# Patient Record
Sex: MALE | Race: WHITE | NOT HISPANIC OR LATINO | Employment: OTHER | URBAN - METROPOLITAN AREA
[De-identification: names, ages, dates, MRNs, and addresses within clinical notes are randomized per-mention and may not be internally consistent; named-entity substitution may affect disease eponyms.]

---

## 2020-03-07 ENCOUNTER — OFFICE VISIT (OUTPATIENT)
Dept: URGENT CARE | Facility: CLINIC | Age: 80
End: 2020-03-07
Payer: MEDICARE

## 2020-03-07 VITALS
SYSTOLIC BLOOD PRESSURE: 142 MMHG | RESPIRATION RATE: 18 BRPM | DIASTOLIC BLOOD PRESSURE: 76 MMHG | HEIGHT: 71 IN | TEMPERATURE: 96.3 F | BODY MASS INDEX: 24.5 KG/M2 | HEART RATE: 58 BPM | OXYGEN SATURATION: 98 % | WEIGHT: 175 LBS

## 2020-03-07 DIAGNOSIS — J30.89 NON-SEASONAL ALLERGIC RHINITIS, UNSPECIFIED TRIGGER: Primary | ICD-10-CM

## 2020-03-07 PROCEDURE — G0463 HOSPITAL OUTPT CLINIC VISIT: HCPCS | Performed by: FAMILY MEDICINE

## 2020-03-07 PROCEDURE — 99203 OFFICE O/P NEW LOW 30 MIN: CPT | Performed by: FAMILY MEDICINE

## 2020-03-07 RX ORDER — SIMVASTATIN 40 MG
40 TABLET ORAL
COMMUNITY
Start: 2020-01-15

## 2020-03-07 NOTE — PROGRESS NOTES
Assessment/Plan:  Asked him to hold the chronic or nasal spray for the next week or to see if his symptoms improve  He can use a saline nasal spray if needed  Follow-up with family doctor as needed  Diagnoses and all orders for this visit:    Non-seasonal allergic rhinitis, unspecified trigger    Other orders  -     simvastatin (ZOCOR) 40 mg tablet  -     budesonide (RINOCORT AQUA) 32 MCG/ACT nasal spray; 1 spray into each nostril daily            Subjective:        Patient ID: Jamal Partida is a 78 y o  male  Patient presents with:  Sinus Problem: "my sinuses burn", onset 2 weeks ago  Bilateral earaches starting this morning    No prior treatment  The following portions of the patient's history were reviewed and updated as appropriate: allergies, current medications, past family history, past medical history, past social history, past surgical history and problem list       Review of Systems   Constitutional: Negative  HENT: Positive for congestion, sinus pressure and sinus pain  Eyes: Negative  Respiratory: Negative  Cardiovascular: Negative  Gastrointestinal: Negative  Endocrine: Negative  Genitourinary: Negative  Musculoskeletal: Negative  Skin: Negative  Allergic/Immunologic: Negative  Neurological: Negative  Hematological: Negative  Psychiatric/Behavioral: Negative  All other systems reviewed and are negative  Objective:             /76   Pulse 58   Temp (!) 96 3 °F (35 7 °C) (Tympanic)   Resp 18   Ht 5' 11" (1 803 m)   Wt 79 4 kg (175 lb)   SpO2 98%   BMI 24 41 kg/m²          Physical Exam   Constitutional: He is oriented to person, place, and time  He appears well-developed and well-nourished  HENT:   Head: Normocephalic and atraumatic     Right Ear: External ear normal    Left Ear: External ear normal    Nose: Nose normal    Mouth/Throat: Oropharynx is clear and moist    Eyes: Pupils are equal, round, and reactive to light  Conjunctivae and EOM are normal    Neck: Normal range of motion  Neck supple  Cardiovascular: Normal rate, regular rhythm and normal heart sounds  Pulmonary/Chest: Effort normal and breath sounds normal    Abdominal: Soft  Bowel sounds are normal    Musculoskeletal: Normal range of motion  Neurological: He is alert and oriented to person, place, and time  He has normal reflexes  Skin: Skin is warm and dry  Psychiatric: He has a normal mood and affect  His behavior is normal    Nursing note and vitals reviewed

## 2020-06-04 DIAGNOSIS — I34.0 NONRHEUMATIC MITRAL (VALVE) INSUFFICIENCY: ICD-10-CM

## 2020-06-05 ENCOUNTER — TELEPHONE (OUTPATIENT)
Dept: NON INVASIVE DIAGNOSTICS | Facility: HOSPITAL | Age: 80
End: 2020-06-05

## 2020-06-12 ENCOUNTER — APPOINTMENT (OUTPATIENT)
Dept: PREADMISSION TESTING | Facility: HOSPITAL | Age: 80
End: 2020-06-12
Payer: MEDICARE

## 2020-06-12 DIAGNOSIS — Z11.59 SCREENING FOR VIRAL DISEASE: Primary | ICD-10-CM

## 2020-06-12 PROCEDURE — U0003 INFECTIOUS AGENT DETECTION BY NUCLEIC ACID (DNA OR RNA); SEVERE ACUTE RESPIRATORY SYNDROME CORONAVIRUS 2 (SARS-COV-2) (CORONAVIRUS DISEASE [COVID-19]), AMPLIFIED PROBE TECHNIQUE, MAKING USE OF HIGH THROUGHPUT TECHNOLOGIES AS DESCRIBED BY CMS-2020-01-R: HCPCS

## 2020-06-15 LAB — SARS-COV-2 RNA SPEC QL NAA+PROBE: NOT DETECTED

## 2020-06-16 ENCOUNTER — TELEPHONE (OUTPATIENT)
Dept: NON INVASIVE DIAGNOSTICS | Facility: HOSPITAL | Age: 80
End: 2020-06-16

## 2020-06-16 ENCOUNTER — ANESTHESIA EVENT (OUTPATIENT)
Dept: NON INVASIVE DIAGNOSTICS | Facility: HOSPITAL | Age: 80
End: 2020-06-16

## 2020-06-17 ENCOUNTER — ANESTHESIA (OUTPATIENT)
Dept: NON INVASIVE DIAGNOSTICS | Facility: HOSPITAL | Age: 80
End: 2020-06-17

## 2020-06-17 ENCOUNTER — HOSPITAL ENCOUNTER (OUTPATIENT)
Dept: NON INVASIVE DIAGNOSTICS | Facility: HOSPITAL | Age: 80
Discharge: HOME | End: 2020-06-17
Attending: INTERNAL MEDICINE
Payer: MEDICARE

## 2020-06-17 VITALS
WEIGHT: 175 LBS | SYSTOLIC BLOOD PRESSURE: 136 MMHG | HEIGHT: 71 IN | BODY MASS INDEX: 24.5 KG/M2 | OXYGEN SATURATION: 98 % | RESPIRATION RATE: 18 BRPM | HEART RATE: 59 BPM | DIASTOLIC BLOOD PRESSURE: 74 MMHG | TEMPERATURE: 97.4 F

## 2020-06-17 DIAGNOSIS — I34.0 NONRHEUMATIC MITRAL (VALVE) INSUFFICIENCY: ICD-10-CM

## 2020-06-17 LAB
ANION GAP SERPL CALCULATED.3IONS-SCNC: 5 MMOL/L (ref 4–13)
BASOPHILS # BLD AUTO: 0.05 THOUSANDS/ΜL (ref 0–0.1)
BASOPHILS NFR BLD AUTO: 1 % (ref 0–1)
BUN SERPL-MCNC: 22 MG/DL (ref 5–25)
CALCIUM SERPL-MCNC: 8.5 MG/DL (ref 8.3–10.1)
CHLORIDE SERPL-SCNC: 106 MMOL/L (ref 100–108)
CO2 SERPL-SCNC: 29 MMOL/L (ref 21–32)
CREAT SERPL-MCNC: 0.99 MG/DL (ref 0.6–1.3)
EOSINOPHIL # BLD AUTO: 0.15 THOUSAND/ΜL (ref 0–0.61)
EOSINOPHIL NFR BLD AUTO: 4 % (ref 0–6)
ERYTHROCYTE [DISTWIDTH] IN BLOOD BY AUTOMATED COUNT: 12.6 % (ref 11.6–15.1)
GFR SERPL CREATININE-BSD FRML MDRD: 71 ML/MIN/1.73SQ M
GLUCOSE P FAST SERPL-MCNC: 103 MG/DL (ref 65–99)
GLUCOSE SERPL-MCNC: 103 MG/DL (ref 65–140)
HCT VFR BLD AUTO: 39.7 % (ref 36.5–49.3)
HGB BLD-MCNC: 13.3 G/DL (ref 12–17)
IMM GRANULOCYTES # BLD AUTO: 0.02 THOUSAND/UL (ref 0–0.2)
IMM GRANULOCYTES NFR BLD AUTO: 1 % (ref 0–2)
LYMPHOCYTES # BLD AUTO: 1.33 THOUSANDS/ΜL (ref 0.6–4.47)
LYMPHOCYTES NFR BLD AUTO: 31 % (ref 14–44)
MCH RBC QN AUTO: 30.6 PG (ref 26.8–34.3)
MCHC RBC AUTO-ENTMCNC: 33.5 G/DL (ref 31.4–37.4)
MCV RBC AUTO: 91 FL (ref 82–98)
MONOCYTES # BLD AUTO: 0.5 THOUSAND/ΜL (ref 0.17–1.22)
MONOCYTES NFR BLD AUTO: 12 % (ref 4–12)
NEUTROPHILS # BLD AUTO: 2.29 THOUSANDS/ΜL (ref 1.85–7.62)
NEUTS SEG NFR BLD AUTO: 51 % (ref 43–75)
NRBC BLD AUTO-RTO: 0 /100 WBCS
PLATELET # BLD AUTO: 156 THOUSANDS/UL (ref 149–390)
PMV BLD AUTO: 9.7 FL (ref 8.9–12.7)
POTASSIUM SERPL-SCNC: 4.8 MMOL/L (ref 3.5–5.3)
RBC # BLD AUTO: 4.35 MILLION/UL (ref 3.88–5.62)
SODIUM SERPL-SCNC: 140 MMOL/L (ref 136–145)
WBC # BLD AUTO: 4.34 THOUSAND/UL (ref 4.31–10.16)

## 2020-06-17 PROCEDURE — 80048 BASIC METABOLIC PNL TOTAL CA: CPT | Performed by: PHYSICIAN ASSISTANT

## 2020-06-17 PROCEDURE — 85025 COMPLETE CBC W/AUTO DIFF WBC: CPT | Performed by: PHYSICIAN ASSISTANT

## 2020-06-17 PROCEDURE — 93312 ECHO TRANSESOPHAGEAL: CPT

## 2020-06-17 RX ORDER — ALBUTEROL SULFATE 2.5 MG/3ML
2.5 SOLUTION RESPIRATORY (INHALATION) ONCE AS NEEDED
Status: CANCELLED | OUTPATIENT
Start: 2020-06-17

## 2020-06-17 RX ORDER — PROMETHAZINE HYDROCHLORIDE 25 MG/ML
12.5 INJECTION, SOLUTION INTRAMUSCULAR; INTRAVENOUS ONCE AS NEEDED
Status: CANCELLED | OUTPATIENT
Start: 2020-06-17

## 2020-06-17 RX ORDER — MEPERIDINE HYDROCHLORIDE 25 MG/ML
12.5 INJECTION INTRAMUSCULAR; INTRAVENOUS; SUBCUTANEOUS ONCE
Status: CANCELLED | OUTPATIENT
Start: 2020-06-17 | End: 2020-06-17

## 2020-06-17 RX ORDER — SODIUM CHLORIDE, SODIUM LACTATE, POTASSIUM CHLORIDE, CALCIUM CHLORIDE 600; 310; 30; 20 MG/100ML; MG/100ML; MG/100ML; MG/100ML
INJECTION, SOLUTION INTRAVENOUS CONTINUOUS PRN
Status: DISCONTINUED | OUTPATIENT
Start: 2020-06-17 | End: 2020-06-17 | Stop reason: SURG

## 2020-06-17 RX ORDER — PROPOFOL 10 MG/ML
INJECTION, EMULSION INTRAVENOUS AS NEEDED
Status: DISCONTINUED | OUTPATIENT
Start: 2020-06-17 | End: 2020-06-17 | Stop reason: SURG

## 2020-06-17 RX ORDER — ONDANSETRON 2 MG/ML
4 INJECTION INTRAMUSCULAR; INTRAVENOUS ONCE AS NEEDED
Status: CANCELLED | OUTPATIENT
Start: 2020-06-17

## 2020-06-17 RX ORDER — LIDOCAINE HYDROCHLORIDE 10 MG/ML
INJECTION, SOLUTION EPIDURAL; INFILTRATION; INTRACAUDAL; PERINEURAL AS NEEDED
Status: DISCONTINUED | OUTPATIENT
Start: 2020-06-17 | End: 2020-06-17 | Stop reason: SURG

## 2020-06-17 RX ORDER — SODIUM CHLORIDE, SODIUM LACTATE, POTASSIUM CHLORIDE, CALCIUM CHLORIDE 600; 310; 30; 20 MG/100ML; MG/100ML; MG/100ML; MG/100ML
125 INJECTION, SOLUTION INTRAVENOUS CONTINUOUS
Status: CANCELLED | OUTPATIENT
Start: 2020-06-17

## 2020-06-17 RX ORDER — FENTANYL CITRATE/PF 50 MCG/ML
25 SYRINGE (ML) INJECTION
Status: CANCELLED | OUTPATIENT
Start: 2020-06-17

## 2020-06-17 RX ORDER — LABETALOL 20 MG/4 ML (5 MG/ML) INTRAVENOUS SYRINGE
5
Status: CANCELLED | OUTPATIENT
Start: 2020-06-17

## 2020-06-17 RX ADMIN — PROPOFOL 50 MG: 10 INJECTION, EMULSION INTRAVENOUS at 08:43

## 2020-06-17 RX ADMIN — PROPOFOL 100 MG: 10 INJECTION, EMULSION INTRAVENOUS at 08:41

## 2020-06-17 RX ADMIN — LIDOCAINE HYDROCHLORIDE 50 MG: 10 INJECTION, SOLUTION EPIDURAL; INFILTRATION; INTRACAUDAL; PERINEURAL at 08:40

## 2020-06-17 RX ADMIN — PROPOFOL 50 MG: 10 INJECTION, EMULSION INTRAVENOUS at 08:47

## 2020-06-17 RX ADMIN — SODIUM CHLORIDE, SODIUM LACTATE, POTASSIUM CHLORIDE, AND CALCIUM CHLORIDE: .6; .31; .03; .02 INJECTION, SOLUTION INTRAVENOUS at 08:35

## 2020-06-17 RX ADMIN — PROPOFOL 20 MG: 10 INJECTION, EMULSION INTRAVENOUS at 08:51

## 2020-06-17 RX ADMIN — PROPOFOL 50 MG: 10 INJECTION, EMULSION INTRAVENOUS at 08:44

## 2020-07-02 DIAGNOSIS — R93.1 ABNORMAL FINDINGS ON DIAGNOSTIC IMAGING OF HEART AND CORONARY CIRCULATION: ICD-10-CM

## 2020-07-02 DIAGNOSIS — I42.0 DILATED CARDIOMYOPATHY (HCC): ICD-10-CM

## 2020-07-02 DIAGNOSIS — E78.5 HYPERLIPIDEMIA, UNSPECIFIED: ICD-10-CM

## 2020-07-02 DIAGNOSIS — I34.0 NONRHEUMATIC MITRAL (VALVE) INSUFFICIENCY: ICD-10-CM

## 2020-07-02 DIAGNOSIS — I49.5 SICK SINUS SYNDROME (HCC): ICD-10-CM

## 2020-07-02 DIAGNOSIS — Z95.0 PRESENCE OF CARDIAC PACEMAKER: ICD-10-CM

## 2020-11-08 ENCOUNTER — HOSPITAL ENCOUNTER (EMERGENCY)
Facility: HOSPITAL | Age: 80
Discharge: ANOTHER HOSPITAL | End: 2020-11-08
Attending: EMERGENCY MEDICINE | Admitting: EMERGENCY MEDICINE
Payer: MEDICARE

## 2020-11-08 ENCOUNTER — APPOINTMENT (EMERGENCY)
Dept: CT IMAGING | Facility: HOSPITAL | Age: 80
End: 2020-11-08
Payer: MEDICARE

## 2020-11-08 ENCOUNTER — APPOINTMENT (EMERGENCY)
Dept: RADIOLOGY | Facility: HOSPITAL | Age: 80
End: 2020-11-08
Payer: MEDICARE

## 2020-11-08 VITALS
SYSTOLIC BLOOD PRESSURE: 176 MMHG | OXYGEN SATURATION: 98 % | HEART RATE: 66 BPM | TEMPERATURE: 97.8 F | RESPIRATION RATE: 18 BRPM | DIASTOLIC BLOOD PRESSURE: 77 MMHG

## 2020-11-08 DIAGNOSIS — S22.42XA CLOSED FRACTURE OF MULTIPLE RIBS OF LEFT SIDE, INITIAL ENCOUNTER: Primary | ICD-10-CM

## 2020-11-08 LAB
ALBUMIN SERPL BCP-MCNC: 3.9 G/DL (ref 3.5–5)
ALP SERPL-CCNC: 65 U/L (ref 46–116)
ALT SERPL W P-5'-P-CCNC: 30 U/L (ref 12–78)
ANION GAP SERPL CALCULATED.3IONS-SCNC: 9 MMOL/L (ref 4–13)
AST SERPL W P-5'-P-CCNC: 26 U/L (ref 5–45)
BACTERIA UR QL AUTO: ABNORMAL /HPF
BASOPHILS # BLD AUTO: 0.1 THOUSANDS/ΜL (ref 0–0.1)
BASOPHILS NFR BLD AUTO: 1 % (ref 0–1)
BILIRUB SERPL-MCNC: 0.46 MG/DL (ref 0.2–1)
BILIRUB UR QL STRIP: NEGATIVE
BUN SERPL-MCNC: 31 MG/DL (ref 5–25)
CALCIUM SERPL-MCNC: 9.4 MG/DL (ref 8.3–10.1)
CHLORIDE SERPL-SCNC: 104 MMOL/L (ref 100–108)
CLARITY UR: CLEAR
CO2 SERPL-SCNC: 28 MMOL/L (ref 21–32)
COLOR UR: YELLOW
CREAT SERPL-MCNC: 1.08 MG/DL (ref 0.6–1.3)
EOSINOPHIL # BLD AUTO: 0.19 THOUSAND/ΜL (ref 0–0.61)
EOSINOPHIL NFR BLD AUTO: 2 % (ref 0–6)
ERYTHROCYTE [DISTWIDTH] IN BLOOD BY AUTOMATED COUNT: 12.7 % (ref 11.6–15.1)
GFR SERPL CREATININE-BSD FRML MDRD: 64 ML/MIN/1.73SQ M
GLUCOSE SERPL-MCNC: 116 MG/DL (ref 65–140)
GLUCOSE UR STRIP-MCNC: NEGATIVE MG/DL
HCT VFR BLD AUTO: 41.4 % (ref 36.5–49.3)
HGB BLD-MCNC: 13.6 G/DL (ref 12–17)
HGB UR QL STRIP.AUTO: ABNORMAL
IMM GRANULOCYTES # BLD AUTO: 0.07 THOUSAND/UL (ref 0–0.2)
IMM GRANULOCYTES NFR BLD AUTO: 1 % (ref 0–2)
KETONES UR STRIP-MCNC: ABNORMAL MG/DL
LEUKOCYTE ESTERASE UR QL STRIP: NEGATIVE
LYMPHOCYTES # BLD AUTO: 1.98 THOUSANDS/ΜL (ref 0.6–4.47)
LYMPHOCYTES NFR BLD AUTO: 23 % (ref 14–44)
MCH RBC QN AUTO: 30.2 PG (ref 26.8–34.3)
MCHC RBC AUTO-ENTMCNC: 32.9 G/DL (ref 31.4–37.4)
MCV RBC AUTO: 92 FL (ref 82–98)
MONOCYTES # BLD AUTO: 0.69 THOUSAND/ΜL (ref 0.17–1.22)
MONOCYTES NFR BLD AUTO: 8 % (ref 4–12)
NEUTROPHILS # BLD AUTO: 5.51 THOUSANDS/ΜL (ref 1.85–7.62)
NEUTS SEG NFR BLD AUTO: 65 % (ref 43–75)
NITRITE UR QL STRIP: NEGATIVE
NON-SQ EPI CELLS URNS QL MICRO: ABNORMAL /HPF
NRBC BLD AUTO-RTO: 0 /100 WBCS
PH UR STRIP.AUTO: 5.5 [PH]
PLATELET # BLD AUTO: 233 THOUSANDS/UL (ref 149–390)
PMV BLD AUTO: 10.1 FL (ref 8.9–12.7)
POTASSIUM SERPL-SCNC: 4.6 MMOL/L (ref 3.5–5.3)
PROT SERPL-MCNC: 7.7 G/DL (ref 6.4–8.2)
PROT UR STRIP-MCNC: NEGATIVE MG/DL
RBC # BLD AUTO: 4.51 MILLION/UL (ref 3.88–5.62)
RBC #/AREA URNS AUTO: ABNORMAL /HPF
SODIUM SERPL-SCNC: 141 MMOL/L (ref 136–145)
SP GR UR STRIP.AUTO: 1.01 (ref 1–1.03)
UROBILINOGEN UR QL STRIP.AUTO: 0.2 E.U./DL
WBC # BLD AUTO: 8.54 THOUSAND/UL (ref 4.31–10.16)
WBC #/AREA URNS AUTO: ABNORMAL /HPF

## 2020-11-08 PROCEDURE — 80053 COMPREHEN METABOLIC PANEL: CPT | Performed by: EMERGENCY MEDICINE

## 2020-11-08 PROCEDURE — 96374 THER/PROPH/DIAG INJ IV PUSH: CPT

## 2020-11-08 PROCEDURE — G1004 CDSM NDSC: HCPCS

## 2020-11-08 PROCEDURE — 85025 COMPLETE CBC W/AUTO DIFF WBC: CPT | Performed by: EMERGENCY MEDICINE

## 2020-11-08 PROCEDURE — 74177 CT ABD & PELVIS W/CONTRAST: CPT

## 2020-11-08 PROCEDURE — 99285 EMERGENCY DEPT VISIT HI MDM: CPT

## 2020-11-08 PROCEDURE — 81001 URINALYSIS AUTO W/SCOPE: CPT | Performed by: EMERGENCY MEDICINE

## 2020-11-08 PROCEDURE — 71260 CT THORAX DX C+: CPT

## 2020-11-08 PROCEDURE — 71045 X-RAY EXAM CHEST 1 VIEW: CPT

## 2020-11-08 PROCEDURE — 96376 TX/PRO/DX INJ SAME DRUG ADON: CPT

## 2020-11-08 PROCEDURE — 36415 COLL VENOUS BLD VENIPUNCTURE: CPT | Performed by: EMERGENCY MEDICINE

## 2020-11-08 PROCEDURE — 99291 CRITICAL CARE FIRST HOUR: CPT | Performed by: EMERGENCY MEDICINE

## 2020-11-08 PROCEDURE — 96361 HYDRATE IV INFUSION ADD-ON: CPT

## 2020-11-08 RX ORDER — FENTANYL CITRATE 50 UG/ML
25 INJECTION, SOLUTION INTRAMUSCULAR; INTRAVENOUS ONCE
Status: COMPLETED | OUTPATIENT
Start: 2020-11-08 | End: 2020-11-08

## 2020-11-08 RX ORDER — FENTANYL CITRATE 50 UG/ML
50 INJECTION, SOLUTION INTRAMUSCULAR; INTRAVENOUS ONCE
Status: COMPLETED | OUTPATIENT
Start: 2020-11-08 | End: 2020-11-08

## 2020-11-08 RX ADMIN — FENTANYL CITRATE 25 MCG: 50 INJECTION INTRAMUSCULAR; INTRAVENOUS at 20:25

## 2020-11-08 RX ADMIN — IOHEXOL 100 ML: 350 INJECTION, SOLUTION INTRAVENOUS at 17:56

## 2020-11-08 RX ADMIN — SODIUM CHLORIDE 1000 ML: 0.9 INJECTION, SOLUTION INTRAVENOUS at 17:03

## 2020-11-08 RX ADMIN — MORPHINE SULFATE 2 MG: 2 INJECTION, SOLUTION INTRAMUSCULAR; INTRAVENOUS at 17:00

## 2020-11-08 RX ADMIN — FENTANYL CITRATE 50 MCG: 50 INJECTION INTRAMUSCULAR; INTRAVENOUS at 18:56

## 2020-11-09 ENCOUNTER — TELEPHONE (OUTPATIENT)
Dept: INTERVENTIONAL RADIOLOGY/VASCULAR | Facility: HOSPITAL | Age: 80
End: 2020-11-09

## 2020-11-10 ENCOUNTER — TELEPHONE (OUTPATIENT)
Dept: INTERVENTIONAL RADIOLOGY/VASCULAR | Facility: HOSPITAL | Age: 80
End: 2020-11-10

## 2020-11-18 ENCOUNTER — APPOINTMENT (EMERGENCY)
Dept: RADIOLOGY | Facility: HOSPITAL | Age: 80
DRG: 199 | End: 2020-11-18
Payer: MEDICARE

## 2020-11-18 ENCOUNTER — APPOINTMENT (EMERGENCY)
Dept: CT IMAGING | Facility: HOSPITAL | Age: 80
DRG: 199 | End: 2020-11-18
Payer: MEDICARE

## 2020-11-18 ENCOUNTER — HOSPITAL ENCOUNTER (INPATIENT)
Facility: HOSPITAL | Age: 80
LOS: 2 days | DRG: 199 | End: 2020-11-20
Attending: EMERGENCY MEDICINE | Admitting: INTERNAL MEDICINE
Payer: MEDICARE

## 2020-11-18 DIAGNOSIS — J96.01 ACUTE RESPIRATORY FAILURE WITH HYPOXIA (HCC): ICD-10-CM

## 2020-11-18 DIAGNOSIS — R06.00 DYSPNEA: ICD-10-CM

## 2020-11-18 DIAGNOSIS — S22.49XA MULTIPLE RIB FRACTURES: ICD-10-CM

## 2020-11-18 DIAGNOSIS — J90 PLEURAL EFFUSION: Primary | ICD-10-CM

## 2020-11-18 DIAGNOSIS — J90 PLEURAL EFFUSION ON LEFT: ICD-10-CM

## 2020-11-18 DIAGNOSIS — S22.42XD CLOSED FRACTURE OF MULTIPLE RIBS OF LEFT SIDE WITH ROUTINE HEALING: ICD-10-CM

## 2020-11-18 LAB
ALBUMIN SERPL BCP-MCNC: 2.9 G/DL (ref 3.5–5)
ALP SERPL-CCNC: 86 U/L (ref 46–116)
ALT SERPL W P-5'-P-CCNC: 35 U/L (ref 12–78)
ANION GAP SERPL CALCULATED.3IONS-SCNC: 6 MMOL/L (ref 4–13)
APTT PPP: 31 SECONDS (ref 23–37)
AST SERPL W P-5'-P-CCNC: 24 U/L (ref 5–45)
BASOPHILS # BLD AUTO: 0.07 THOUSANDS/ΜL (ref 0–0.1)
BASOPHILS NFR BLD AUTO: 1 % (ref 0–1)
BILIRUB SERPL-MCNC: 0.67 MG/DL (ref 0.2–1)
BUN SERPL-MCNC: 25 MG/DL (ref 5–25)
CALCIUM ALBUM COR SERPL-MCNC: 9.4 MG/DL (ref 8.3–10.1)
CALCIUM SERPL-MCNC: 8.5 MG/DL (ref 8.3–10.1)
CHLORIDE SERPL-SCNC: 101 MMOL/L (ref 100–108)
CO2 SERPL-SCNC: 26 MMOL/L (ref 21–32)
CREAT SERPL-MCNC: 1.08 MG/DL (ref 0.6–1.3)
EOSINOPHIL # BLD AUTO: 0.08 THOUSAND/ΜL (ref 0–0.61)
EOSINOPHIL NFR BLD AUTO: 1 % (ref 0–6)
ERYTHROCYTE [DISTWIDTH] IN BLOOD BY AUTOMATED COUNT: 13.1 % (ref 11.6–15.1)
FLUAV RNA RESP QL NAA+PROBE: NEGATIVE
FLUBV RNA RESP QL NAA+PROBE: NEGATIVE
GFR SERPL CREATININE-BSD FRML MDRD: 64 ML/MIN/1.73SQ M
GLUCOSE SERPL-MCNC: 189 MG/DL (ref 65–140)
HCT VFR BLD AUTO: 35.3 % (ref 36.5–49.3)
HGB BLD-MCNC: 11.7 G/DL (ref 12–17)
IMM GRANULOCYTES # BLD AUTO: 0.15 THOUSAND/UL (ref 0–0.2)
IMM GRANULOCYTES NFR BLD AUTO: 1 % (ref 0–2)
INR PPP: 1.05 (ref 0.84–1.19)
LACTATE SERPL-SCNC: 1.3 MMOL/L (ref 0.5–2)
LYMPHOCYTES # BLD AUTO: 2.4 THOUSANDS/ΜL (ref 0.6–4.47)
LYMPHOCYTES NFR BLD AUTO: 17 % (ref 14–44)
MCH RBC QN AUTO: 30.3 PG (ref 26.8–34.3)
MCHC RBC AUTO-ENTMCNC: 33.1 G/DL (ref 31.4–37.4)
MCV RBC AUTO: 92 FL (ref 82–98)
MONOCYTES # BLD AUTO: 1.98 THOUSAND/ΜL (ref 0.17–1.22)
MONOCYTES NFR BLD AUTO: 14 % (ref 4–12)
NEUTROPHILS # BLD AUTO: 9.59 THOUSANDS/ΜL (ref 1.85–7.62)
NEUTS SEG NFR BLD AUTO: 66 % (ref 43–75)
NRBC BLD AUTO-RTO: 0 /100 WBCS
NT-PROBNP SERPL-MCNC: 110 PG/ML
PLATELET # BLD AUTO: 319 THOUSANDS/UL (ref 149–390)
PMV BLD AUTO: 9.4 FL (ref 8.9–12.7)
POTASSIUM SERPL-SCNC: 4.2 MMOL/L (ref 3.5–5.3)
PROT SERPL-MCNC: 6.9 G/DL (ref 6.4–8.2)
PROTHROMBIN TIME: 13.6 SECONDS (ref 11.6–14.5)
RBC # BLD AUTO: 3.86 MILLION/UL (ref 3.88–5.62)
RSV RNA RESP QL NAA+PROBE: NEGATIVE
SARS-COV-2 RNA RESP QL NAA+PROBE: NEGATIVE
SODIUM SERPL-SCNC: 133 MMOL/L (ref 136–145)
TROPONIN I SERPL-MCNC: <0.02 NG/ML
WBC # BLD AUTO: 14.27 THOUSAND/UL (ref 4.31–10.16)

## 2020-11-18 PROCEDURE — 99222 1ST HOSP IP/OBS MODERATE 55: CPT | Performed by: NURSE PRACTITIONER

## 2020-11-18 PROCEDURE — 0241U HB NFCT DS VIR RESP RNA 4 TRGT: CPT | Performed by: EMERGENCY MEDICINE

## 2020-11-18 PROCEDURE — 85025 COMPLETE CBC W/AUTO DIFF WBC: CPT | Performed by: EMERGENCY MEDICINE

## 2020-11-18 PROCEDURE — 87040 BLOOD CULTURE FOR BACTERIA: CPT | Performed by: EMERGENCY MEDICINE

## 2020-11-18 PROCEDURE — 84484 ASSAY OF TROPONIN QUANT: CPT | Performed by: EMERGENCY MEDICINE

## 2020-11-18 PROCEDURE — G1004 CDSM NDSC: HCPCS

## 2020-11-18 PROCEDURE — 99285 EMERGENCY DEPT VISIT HI MDM: CPT | Performed by: EMERGENCY MEDICINE

## 2020-11-18 PROCEDURE — 85610 PROTHROMBIN TIME: CPT | Performed by: EMERGENCY MEDICINE

## 2020-11-18 PROCEDURE — 36415 COLL VENOUS BLD VENIPUNCTURE: CPT | Performed by: EMERGENCY MEDICINE

## 2020-11-18 PROCEDURE — 83605 ASSAY OF LACTIC ACID: CPT | Performed by: EMERGENCY MEDICINE

## 2020-11-18 PROCEDURE — 83036 HEMOGLOBIN GLYCOSYLATED A1C: CPT | Performed by: NURSE PRACTITIONER

## 2020-11-18 PROCEDURE — 85730 THROMBOPLASTIN TIME PARTIAL: CPT | Performed by: EMERGENCY MEDICINE

## 2020-11-18 PROCEDURE — 93005 ELECTROCARDIOGRAM TRACING: CPT

## 2020-11-18 PROCEDURE — 99285 EMERGENCY DEPT VISIT HI MDM: CPT

## 2020-11-18 PROCEDURE — 71045 X-RAY EXAM CHEST 1 VIEW: CPT

## 2020-11-18 PROCEDURE — 83880 ASSAY OF NATRIURETIC PEPTIDE: CPT | Performed by: EMERGENCY MEDICINE

## 2020-11-18 PROCEDURE — 84145 PROCALCITONIN (PCT): CPT | Performed by: EMERGENCY MEDICINE

## 2020-11-18 PROCEDURE — 1123F ACP DISCUSS/DSCN MKR DOCD: CPT | Performed by: EMERGENCY MEDICINE

## 2020-11-18 PROCEDURE — 80053 COMPREHEN METABOLIC PANEL: CPT | Performed by: EMERGENCY MEDICINE

## 2020-11-18 PROCEDURE — 71260 CT THORAX DX C+: CPT

## 2020-11-18 RX ORDER — ONDANSETRON 2 MG/ML
4 INJECTION INTRAMUSCULAR; INTRAVENOUS EVERY 6 HOURS PRN
Status: DISCONTINUED | OUTPATIENT
Start: 2020-11-18 | End: 2020-11-20 | Stop reason: HOSPADM

## 2020-11-18 RX ORDER — CEFEPIME HYDROCHLORIDE 2 G/50ML
2000 INJECTION, SOLUTION INTRAVENOUS EVERY 12 HOURS
Status: DISCONTINUED | OUTPATIENT
Start: 2020-11-18 | End: 2020-11-20 | Stop reason: HOSPADM

## 2020-11-18 RX ORDER — PANTOPRAZOLE SODIUM 40 MG/1
40 TABLET, DELAYED RELEASE ORAL
Status: DISCONTINUED | OUTPATIENT
Start: 2020-11-19 | End: 2020-11-20 | Stop reason: HOSPADM

## 2020-11-18 RX ORDER — METRONIDAZOLE 500 MG/1
500 TABLET ORAL EVERY 8 HOURS SCHEDULED
Status: DISCONTINUED | OUTPATIENT
Start: 2020-11-18 | End: 2020-11-20 | Stop reason: HOSPADM

## 2020-11-18 RX ORDER — SENNOSIDES 8.6 MG
1 TABLET ORAL
Status: DISCONTINUED | OUTPATIENT
Start: 2020-11-18 | End: 2020-11-20 | Stop reason: HOSPADM

## 2020-11-18 RX ORDER — ACETAMINOPHEN 325 MG/1
650 TABLET ORAL EVERY 6 HOURS PRN
Status: DISCONTINUED | OUTPATIENT
Start: 2020-11-18 | End: 2020-11-20 | Stop reason: HOSPADM

## 2020-11-18 RX ORDER — DOCUSATE SODIUM 100 MG/1
100 CAPSULE, LIQUID FILLED ORAL 2 TIMES DAILY
Status: DISCONTINUED | OUTPATIENT
Start: 2020-11-19 | End: 2020-11-20 | Stop reason: HOSPADM

## 2020-11-18 RX ADMIN — IOHEXOL 85 ML: 350 INJECTION, SOLUTION INTRAVENOUS at 20:02

## 2020-11-18 RX ADMIN — VANCOMYCIN HYDROCHLORIDE 1750 MG: 1 INJECTION, POWDER, LYOPHILIZED, FOR SOLUTION INTRAVENOUS at 23:54

## 2020-11-19 ENCOUNTER — APPOINTMENT (INPATIENT)
Dept: RADIOLOGY | Facility: HOSPITAL | Age: 80
DRG: 199 | End: 2020-11-19
Payer: MEDICARE

## 2020-11-19 PROBLEM — J94.2 HEMOTHORAX ON LEFT: Status: ACTIVE | Noted: 2020-11-18

## 2020-11-19 PROBLEM — I34.0 MR (MITRAL REGURGITATION): Status: ACTIVE | Noted: 2020-11-19

## 2020-11-19 PROBLEM — D64.9 NORMOCYTIC ANEMIA: Status: ACTIVE | Noted: 2020-11-19

## 2020-11-19 PROBLEM — Z86.79 HISTORY OF SICK SINUS SYNDROME: Status: ACTIVE | Noted: 2020-11-19

## 2020-11-19 PROBLEM — D49.4 BLADDER TUMOR: Status: ACTIVE | Noted: 2020-06-24

## 2020-11-19 PROBLEM — E87.1 HYPONATREMIA: Status: ACTIVE | Noted: 2020-11-19

## 2020-11-19 LAB
ALBUMIN SERPL BCP-MCNC: 2.6 G/DL (ref 3.5–5)
ALP SERPL-CCNC: 77 U/L (ref 46–116)
ALT SERPL W P-5'-P-CCNC: 31 U/L (ref 12–78)
ANION GAP SERPL CALCULATED.3IONS-SCNC: 5 MMOL/L (ref 4–13)
APPEARANCE FLD: ABNORMAL
AST SERPL W P-5'-P-CCNC: 25 U/L (ref 5–45)
BASOPHILS # BLD AUTO: 0.05 THOUSANDS/ΜL (ref 0–0.1)
BASOPHILS NFR BLD AUTO: 1 % (ref 0–1)
BILIRUB SERPL-MCNC: 0.6 MG/DL (ref 0.2–1)
BUN SERPL-MCNC: 25 MG/DL (ref 5–25)
CALCIUM ALBUM COR SERPL-MCNC: 9.5 MG/DL (ref 8.3–10.1)
CALCIUM SERPL-MCNC: 8.4 MG/DL (ref 8.3–10.1)
CHLORIDE SERPL-SCNC: 102 MMOL/L (ref 100–108)
CO2 SERPL-SCNC: 27 MMOL/L (ref 21–32)
COLOR FLD: ABNORMAL
CREAT SERPL-MCNC: 1.02 MG/DL (ref 0.6–1.3)
EOSINOPHIL # BLD AUTO: 0.06 THOUSAND/ΜL (ref 0–0.61)
EOSINOPHIL NFR BLD AUTO: 1 % (ref 0–6)
EOSINOPHIL NFR FLD MANUAL: 2 %
ERYTHROCYTE [DISTWIDTH] IN BLOOD BY AUTOMATED COUNT: 13.1 % (ref 11.6–15.1)
EST. AVERAGE GLUCOSE BLD GHB EST-MCNC: 123 MG/DL
GFR SERPL CREATININE-BSD FRML MDRD: 68 ML/MIN/1.73SQ M
GLUCOSE SERPL-MCNC: 122 MG/DL (ref 65–140)
HBA1C MFR BLD: 5.9 %
HCT VFR BLD AUTO: 32.6 % (ref 36.5–49.3)
HGB BLD-MCNC: 10.5 G/DL (ref 12–17)
IMM GRANULOCYTES # BLD AUTO: 0.09 THOUSAND/UL (ref 0–0.2)
IMM GRANULOCYTES NFR BLD AUTO: 1 % (ref 0–2)
LDH FLD L TO P-CCNC: 2330 U/L
LDH SERPL-CCNC: 280 U/L (ref 81–234)
LYMPHOCYTES # BLD AUTO: 1.48 THOUSANDS/ΜL (ref 0.6–4.47)
LYMPHOCYTES NFR BLD AUTO: 14 % (ref 14–44)
LYMPHOCYTES NFR BLD AUTO: 5 %
MAGNESIUM SERPL-MCNC: 2.1 MG/DL (ref 1.6–2.6)
MCH RBC QN AUTO: 29.7 PG (ref 26.8–34.3)
MCHC RBC AUTO-ENTMCNC: 32.2 G/DL (ref 31.4–37.4)
MCV RBC AUTO: 92 FL (ref 82–98)
MONOCYTES # BLD AUTO: 1.72 THOUSAND/ΜL (ref 0.17–1.22)
MONOCYTES NFR BLD AUTO: 16 % (ref 4–12)
MONOCYTES NFR BLD AUTO: 4 %
NEUTROPHILS # BLD AUTO: 7.37 THOUSANDS/ΜL (ref 1.85–7.62)
NEUTS BAND NFR FLD MANUAL: 1 %
NEUTS SEG NFR BLD AUTO: 67 % (ref 43–75)
NEUTS SEG NFR BLD AUTO: 88 %
NRBC BLD AUTO-RTO: 0 /100 WBCS
PH BODY FLUID: 7.3
PHOSPHATE SERPL-MCNC: 2.7 MG/DL (ref 2.3–4.1)
PLATELET # BLD AUTO: 281 THOUSANDS/UL (ref 149–390)
PMV BLD AUTO: 9.5 FL (ref 8.9–12.7)
POTASSIUM SERPL-SCNC: 4.1 MMOL/L (ref 3.5–5.3)
PROCALCITONIN SERPL-MCNC: 0.13 NG/ML
PROT FLD-MCNC: 4.2 G/DL
PROT SERPL-MCNC: 6.4 G/DL (ref 6.4–8.2)
RBC # BLD AUTO: 3.54 MILLION/UL (ref 3.88–5.62)
SITE: ABNORMAL
SODIUM SERPL-SCNC: 134 MMOL/L (ref 136–145)
TOTAL CELLS COUNTED SPEC: 100
TROPONIN I SERPL-MCNC: 0.02 NG/ML
WBC # BLD AUTO: 10.77 THOUSAND/UL (ref 4.31–10.16)
WBC # FLD MANUAL: 4908 /UL

## 2020-11-19 PROCEDURE — 88112 CYTOPATH CELL ENHANCE TECH: CPT | Performed by: PATHOLOGY

## 2020-11-19 PROCEDURE — 87070 CULTURE OTHR SPECIMN AEROBIC: CPT | Performed by: FAMILY MEDICINE

## 2020-11-19 PROCEDURE — 84100 ASSAY OF PHOSPHORUS: CPT | Performed by: NURSE PRACTITIONER

## 2020-11-19 PROCEDURE — 71045 X-RAY EXAM CHEST 1 VIEW: CPT

## 2020-11-19 PROCEDURE — 84157 ASSAY OF PROTEIN OTHER: CPT | Performed by: FAMILY MEDICINE

## 2020-11-19 PROCEDURE — 88342 IMHCHEM/IMCYTCHM 1ST ANTB: CPT | Performed by: PATHOLOGY

## 2020-11-19 PROCEDURE — 85025 COMPLETE CBC W/AUTO DIFF WBC: CPT | Performed by: NURSE PRACTITIONER

## 2020-11-19 PROCEDURE — 83735 ASSAY OF MAGNESIUM: CPT | Performed by: NURSE PRACTITIONER

## 2020-11-19 PROCEDURE — 88341 IMHCHEM/IMCYTCHM EA ADD ANTB: CPT | Performed by: PATHOLOGY

## 2020-11-19 PROCEDURE — 83615 LACTATE (LD) (LDH) ENZYME: CPT | Performed by: FAMILY MEDICINE

## 2020-11-19 PROCEDURE — 83986 ASSAY PH BODY FLUID NOS: CPT | Performed by: FAMILY MEDICINE

## 2020-11-19 PROCEDURE — 88305 TISSUE EXAM BY PATHOLOGIST: CPT | Performed by: PATHOLOGY

## 2020-11-19 PROCEDURE — 87081 CULTURE SCREEN ONLY: CPT | Performed by: NURSE PRACTITIONER

## 2020-11-19 PROCEDURE — 89051 BODY FLUID CELL COUNT: CPT | Performed by: FAMILY MEDICINE

## 2020-11-19 PROCEDURE — 84484 ASSAY OF TROPONIN QUANT: CPT | Performed by: NURSE PRACTITIONER

## 2020-11-19 PROCEDURE — 0W9B3ZZ DRAINAGE OF LEFT PLEURAL CAVITY, PERCUTANEOUS APPROACH: ICD-10-PCS | Performed by: STUDENT IN AN ORGANIZED HEALTH CARE EDUCATION/TRAINING PROGRAM

## 2020-11-19 PROCEDURE — 87205 SMEAR GRAM STAIN: CPT | Performed by: FAMILY MEDICINE

## 2020-11-19 PROCEDURE — 99239 HOSP IP/OBS DSCHRG MGMT >30: CPT | Performed by: FAMILY MEDICINE

## 2020-11-19 PROCEDURE — 93005 ELECTROCARDIOGRAM TRACING: CPT

## 2020-11-19 PROCEDURE — 80053 COMPREHEN METABOLIC PANEL: CPT | Performed by: NURSE PRACTITIONER

## 2020-11-19 PROCEDURE — 32555 ASPIRATE PLEURA W/ IMAGING: CPT | Performed by: INTERNAL MEDICINE

## 2020-11-19 RX ORDER — VANCOMYCIN HYDROCHLORIDE 1 G/200ML
12.5 INJECTION, SOLUTION INTRAVENOUS EVERY 12 HOURS
Status: CANCELLED | OUTPATIENT
Start: 2020-11-20

## 2020-11-19 RX ORDER — POLYETHYLENE GLYCOL 3350 17 G/17G
17 POWDER, FOR SOLUTION ORAL DAILY PRN
Status: DISCONTINUED | OUTPATIENT
Start: 2020-11-19 | End: 2020-11-20 | Stop reason: HOSPADM

## 2020-11-19 RX ORDER — CEFEPIME HYDROCHLORIDE 2 G/50ML
2000 INJECTION, SOLUTION INTRAVENOUS EVERY 12 HOURS
Status: CANCELLED | OUTPATIENT
Start: 2020-11-19

## 2020-11-19 RX ORDER — ACETAMINOPHEN 325 MG/1
650 TABLET ORAL EVERY 6 HOURS PRN
Status: CANCELLED | OUTPATIENT
Start: 2020-11-19

## 2020-11-19 RX ORDER — DOCUSATE SODIUM 100 MG/1
100 CAPSULE, LIQUID FILLED ORAL 2 TIMES DAILY
Status: CANCELLED | OUTPATIENT
Start: 2020-11-19

## 2020-11-19 RX ORDER — ONDANSETRON 2 MG/ML
4 INJECTION INTRAMUSCULAR; INTRAVENOUS EVERY 6 HOURS PRN
Status: CANCELLED | OUTPATIENT
Start: 2020-11-19

## 2020-11-19 RX ORDER — LIDOCAINE 50 MG/G
1 PATCH TOPICAL DAILY
Status: DISCONTINUED | OUTPATIENT
Start: 2020-11-19 | End: 2020-11-20 | Stop reason: HOSPADM

## 2020-11-19 RX ORDER — PANTOPRAZOLE SODIUM 40 MG/1
40 TABLET, DELAYED RELEASE ORAL
Status: CANCELLED | OUTPATIENT
Start: 2020-11-20

## 2020-11-19 RX ORDER — VANCOMYCIN HYDROCHLORIDE 1 G/200ML
12.5 INJECTION, SOLUTION INTRAVENOUS EVERY 12 HOURS
Status: DISCONTINUED | OUTPATIENT
Start: 2020-11-19 | End: 2020-11-20 | Stop reason: HOSPADM

## 2020-11-19 RX ORDER — POLYETHYLENE GLYCOL 3350 17 G/17G
17 POWDER, FOR SOLUTION ORAL DAILY PRN
Status: CANCELLED | OUTPATIENT
Start: 2020-11-19

## 2020-11-19 RX ORDER — METRONIDAZOLE 500 MG/1
500 TABLET ORAL EVERY 8 HOURS SCHEDULED
Status: CANCELLED | OUTPATIENT
Start: 2020-11-19

## 2020-11-19 RX ORDER — SENNOSIDES 8.6 MG
1 TABLET ORAL
Status: CANCELLED | OUTPATIENT
Start: 2020-11-19

## 2020-11-19 RX ORDER — LIDOCAINE 50 MG/G
1 PATCH TOPICAL DAILY
Status: CANCELLED | OUTPATIENT
Start: 2020-11-20

## 2020-11-19 RX ORDER — LIDOCAINE HYDROCHLORIDE 10 MG/ML
10 INJECTION, SOLUTION EPIDURAL; INFILTRATION; INTRACAUDAL; PERINEURAL ONCE
Status: COMPLETED | OUTPATIENT
Start: 2020-11-19 | End: 2020-11-19

## 2020-11-19 RX ADMIN — LIDOCAINE 1 PATCH: 50 PATCH TOPICAL at 12:34

## 2020-11-19 RX ADMIN — DOCUSATE SODIUM 100 MG: 100 CAPSULE ORAL at 08:17

## 2020-11-19 RX ADMIN — STANDARDIZED SENNA CONCENTRATE 8.6 MG: 8.6 TABLET ORAL at 00:06

## 2020-11-19 RX ADMIN — METRONIDAZOLE 500 MG: 500 TABLET, FILM COATED ORAL at 00:05

## 2020-11-19 RX ADMIN — METRONIDAZOLE 500 MG: 500 TABLET, FILM COATED ORAL at 05:43

## 2020-11-19 RX ADMIN — ACETAMINOPHEN 650 MG: 325 TABLET ORAL at 20:42

## 2020-11-19 RX ADMIN — METRONIDAZOLE 500 MG: 500 TABLET, FILM COATED ORAL at 21:48

## 2020-11-19 RX ADMIN — DOCUSATE SODIUM 100 MG: 100 CAPSULE ORAL at 18:14

## 2020-11-19 RX ADMIN — POLYETHYLENE GLYCOL 3350 17 G: 17 POWDER, FOR SOLUTION ORAL at 18:15

## 2020-11-19 RX ADMIN — LIDOCAINE HYDROCHLORIDE 10 ML: 10 INJECTION, SOLUTION EPIDURAL; INFILTRATION; INTRACAUDAL; PERINEURAL at 11:53

## 2020-11-19 RX ADMIN — CEFEPIME HYDROCHLORIDE 2000 MG: 2 INJECTION, SOLUTION INTRAVENOUS at 02:00

## 2020-11-19 RX ADMIN — CEFEPIME HYDROCHLORIDE 2000 MG: 2 INJECTION, SOLUTION INTRAVENOUS at 23:23

## 2020-11-19 RX ADMIN — STANDARDIZED SENNA CONCENTRATE 8.6 MG: 8.6 TABLET ORAL at 21:48

## 2020-11-19 RX ADMIN — ACETAMINOPHEN 650 MG: 325 TABLET ORAL at 05:47

## 2020-11-19 RX ADMIN — METRONIDAZOLE 500 MG: 500 TABLET, FILM COATED ORAL at 14:15

## 2020-11-19 RX ADMIN — VANCOMYCIN HYDROCHLORIDE 1000 MG: 1 INJECTION, SOLUTION INTRAVENOUS at 12:38

## 2020-11-19 RX ADMIN — CEFEPIME HYDROCHLORIDE 2000 MG: 2 INJECTION, SOLUTION INTRAVENOUS at 11:31

## 2020-11-19 RX ADMIN — ACETAMINOPHEN 650 MG: 325 TABLET ORAL at 11:30

## 2020-11-19 RX ADMIN — PANTOPRAZOLE SODIUM 40 MG: 40 TABLET, DELAYED RELEASE ORAL at 05:44

## 2020-11-20 ENCOUNTER — HOSPITAL ENCOUNTER (INPATIENT)
Facility: HOSPITAL | Age: 80
LOS: 5 days | Discharge: HOME | DRG: 163 | End: 2020-11-25
Attending: GENERAL PRACTICE | Admitting: INTERNAL MEDICINE
Payer: MEDICARE

## 2020-11-20 VITALS
RESPIRATION RATE: 19 BRPM | WEIGHT: 193.56 LBS | OXYGEN SATURATION: 93 % | HEART RATE: 93 BPM | TEMPERATURE: 97.8 F | SYSTOLIC BLOOD PRESSURE: 131 MMHG | HEIGHT: 69 IN | BODY MASS INDEX: 28.67 KG/M2 | DIASTOLIC BLOOD PRESSURE: 65 MMHG

## 2020-11-20 DIAGNOSIS — J90 PLEURAL EFFUSION ON LEFT: ICD-10-CM

## 2020-11-20 DIAGNOSIS — S22.42XD CLOSED FRACTURE OF MULTIPLE RIBS OF LEFT SIDE WITH ROUTINE HEALING: ICD-10-CM

## 2020-11-20 DIAGNOSIS — J90 PLEURAL EFFUSION: ICD-10-CM

## 2020-11-20 DIAGNOSIS — I34.0 MITRAL VALVE INSUFFICIENCY, UNSPECIFIED ETIOLOGY: ICD-10-CM

## 2020-11-20 DIAGNOSIS — J30.89 NON-SEASONAL ALLERGIC RHINITIS, UNSPECIFIED TRIGGER: Primary | ICD-10-CM

## 2020-11-20 DIAGNOSIS — Z86.79 HISTORY OF SICK SINUS SYNDROME: ICD-10-CM

## 2020-11-20 DIAGNOSIS — J96.01 ACUTE RESPIRATORY FAILURE WITH HYPOXIA (HCC): ICD-10-CM

## 2020-11-20 PROBLEM — R07.9 CHEST PAIN: Status: ACTIVE | Noted: 2020-11-20

## 2020-11-20 LAB
ALBUMIN SERPL BCP-MCNC: 2.6 G/DL (ref 3.5–5)
ALP SERPL-CCNC: 88 U/L (ref 46–116)
ALT SERPL W P-5'-P-CCNC: 34 U/L (ref 12–78)
ANION GAP SERPL CALCULATED.3IONS-SCNC: 5 MMOL/L (ref 4–13)
AST SERPL W P-5'-P-CCNC: 25 U/L (ref 5–45)
ATRIAL RATE: 56 BPM
ATRIAL RATE: 60 BPM
BASOPHILS # BLD AUTO: 0.05 THOUSANDS/ΜL (ref 0–0.1)
BASOPHILS NFR BLD AUTO: 1 % (ref 0–1)
BILIRUB SERPL-MCNC: 0.52 MG/DL (ref 0.2–1)
BUN SERPL-MCNC: 22 MG/DL (ref 5–25)
CALCIUM ALBUM COR SERPL-MCNC: 9.7 MG/DL (ref 8.3–10.1)
CALCIUM SERPL-MCNC: 8.6 MG/DL (ref 8.3–10.1)
CHLORIDE SERPL-SCNC: 103 MMOL/L (ref 100–108)
CHOLEST SERPL-MCNC: 140 MG/DL (ref 50–200)
CO2 SERPL-SCNC: 28 MMOL/L (ref 21–32)
CREAT SERPL-MCNC: 1.07 MG/DL (ref 0.6–1.3)
EOSINOPHIL # BLD AUTO: 0.32 THOUSAND/ΜL (ref 0–0.61)
EOSINOPHIL NFR BLD AUTO: 3 % (ref 0–6)
ERYTHROCYTE [DISTWIDTH] IN BLOOD BY AUTOMATED COUNT: 13 % (ref 11.6–15.1)
GFR SERPL CREATININE-BSD FRML MDRD: 64 ML/MIN/1.73SQ M
GLUCOSE SERPL-MCNC: 116 MG/DL (ref 65–140)
HCT VFR BLD AUTO: 32.1 % (ref 36.5–49.3)
HDLC SERPL-MCNC: 75 MG/DL
HGB BLD-MCNC: 10.5 G/DL (ref 12–17)
IMM GRANULOCYTES # BLD AUTO: 0.07 THOUSAND/UL (ref 0–0.2)
IMM GRANULOCYTES NFR BLD AUTO: 1 % (ref 0–2)
LDLC SERPL CALC-MCNC: 50 MG/DL (ref 0–100)
LYMPHOCYTES # BLD AUTO: 1.55 THOUSANDS/ΜL (ref 0.6–4.47)
LYMPHOCYTES NFR BLD AUTO: 16 % (ref 14–44)
MAGNESIUM SERPL-MCNC: 2.2 MG/DL (ref 1.6–2.6)
MCH RBC QN AUTO: 30.1 PG (ref 26.8–34.3)
MCHC RBC AUTO-ENTMCNC: 32.7 G/DL (ref 31.4–37.4)
MCV RBC AUTO: 92 FL (ref 82–98)
MONOCYTES # BLD AUTO: 1.27 THOUSAND/ΜL (ref 0.17–1.22)
MONOCYTES NFR BLD AUTO: 13 % (ref 4–12)
MRSA NOSE QL CULT: NORMAL
NEUTROPHILS # BLD AUTO: 6.55 THOUSANDS/ΜL (ref 1.85–7.62)
NEUTS SEG NFR BLD AUTO: 66 % (ref 43–75)
NONHDLC SERPL-MCNC: 65 MG/DL
NRBC BLD AUTO-RTO: 0 /100 WBCS
P AXIS: 52 DEGREES
P AXIS: 63 DEGREES
PLATELET # BLD AUTO: 277 THOUSANDS/UL (ref 149–390)
PMV BLD AUTO: 8.8 FL (ref 8.9–12.7)
POTASSIUM SERPL-SCNC: 4.4 MMOL/L (ref 3.5–5.3)
PR INTERVAL: 288 MS
PROCALCITONIN SERPL-MCNC: 0.53 NG/ML
PROCALCITONIN SERPL-MCNC: 0.56 NG/ML
PROT SERPL-MCNC: 6.7 G/DL (ref 6.4–8.2)
QRS AXIS: 116 DEGREES
QRS AXIS: 122 DEGREES
QRSD INTERVAL: 146 MS
QRSD INTERVAL: 148 MS
QT INTERVAL: 418 MS
QT INTERVAL: 476 MS
QTC INTERVAL: 441 MS
QTC INTERVAL: 476 MS
RBC # BLD AUTO: 3.49 MILLION/UL (ref 3.88–5.62)
SODIUM SERPL-SCNC: 136 MMOL/L (ref 136–145)
T WAVE AXIS: -16 DEGREES
T WAVE AXIS: -56 DEGREES
TRIGL SERPL-MCNC: 74 MG/DL
TROPONIN I SERPL-MCNC: <0.02 NG/ML
VANCOMYCIN TROUGH SERPL-MCNC: 11.3 UG/ML (ref 10–20)
VENTRICULAR RATE: 60 BPM
VENTRICULAR RATE: 67 BPM
WBC # BLD AUTO: 9.81 THOUSAND/UL (ref 4.31–10.16)

## 2020-11-20 PROCEDURE — 99232 SBSQ HOSP IP/OBS MODERATE 35: CPT | Performed by: INTERNAL MEDICINE

## 2020-11-20 PROCEDURE — 80061 LIPID PANEL: CPT | Performed by: INTERNAL MEDICINE

## 2020-11-20 PROCEDURE — 84145 PROCALCITONIN (PCT): CPT | Performed by: EMERGENCY MEDICINE

## 2020-11-20 PROCEDURE — 93005 ELECTROCARDIOGRAM TRACING: CPT

## 2020-11-20 PROCEDURE — 99223 1ST HOSP IP/OBS HIGH 75: CPT | Performed by: THORACIC SURGERY (CARDIOTHORACIC VASCULAR SURGERY)

## 2020-11-20 PROCEDURE — 99222 1ST HOSP IP/OBS MODERATE 55: CPT | Performed by: INTERNAL MEDICINE

## 2020-11-20 PROCEDURE — 84145 PROCALCITONIN (PCT): CPT | Performed by: INTERNAL MEDICINE

## 2020-11-20 PROCEDURE — 87081 CULTURE SCREEN ONLY: CPT | Performed by: INTERNAL MEDICINE

## 2020-11-20 PROCEDURE — 85025 COMPLETE CBC W/AUTO DIFF WBC: CPT | Performed by: NURSE PRACTITIONER

## 2020-11-20 PROCEDURE — 83735 ASSAY OF MAGNESIUM: CPT | Performed by: NURSE PRACTITIONER

## 2020-11-20 PROCEDURE — 80053 COMPREHEN METABOLIC PANEL: CPT | Performed by: NURSE PRACTITIONER

## 2020-11-20 PROCEDURE — 80202 ASSAY OF VANCOMYCIN: CPT | Performed by: NURSE PRACTITIONER

## 2020-11-20 PROCEDURE — 84484 ASSAY OF TROPONIN QUANT: CPT | Performed by: INTERNAL MEDICINE

## 2020-11-20 PROCEDURE — 99223 1ST HOSP IP/OBS HIGH 75: CPT | Performed by: INTERNAL MEDICINE

## 2020-11-20 RX ORDER — ONDANSETRON 2 MG/ML
4 INJECTION INTRAMUSCULAR; INTRAVENOUS EVERY 6 HOURS PRN
Status: DISCONTINUED | OUTPATIENT
Start: 2020-11-20 | End: 2020-11-25 | Stop reason: HOSPADM

## 2020-11-20 RX ORDER — SODIUM CHLORIDE 9 MG/ML
100 INJECTION, SOLUTION INTRAVENOUS CONTINUOUS
Status: DISCONTINUED | OUTPATIENT
Start: 2020-11-20 | End: 2020-11-20

## 2020-11-20 RX ORDER — SENNOSIDES 8.6 MG
1 TABLET ORAL
Status: DISCONTINUED | OUTPATIENT
Start: 2020-11-20 | End: 2020-11-25 | Stop reason: HOSPADM

## 2020-11-20 RX ORDER — POLYETHYLENE GLYCOL 3350 17 G/17G
17 POWDER, FOR SOLUTION ORAL DAILY PRN
Status: DISCONTINUED | OUTPATIENT
Start: 2020-11-20 | End: 2020-11-25 | Stop reason: HOSPADM

## 2020-11-20 RX ORDER — ACETAMINOPHEN 325 MG/1
650 TABLET ORAL EVERY 6 HOURS PRN
Status: DISCONTINUED | OUTPATIENT
Start: 2020-11-20 | End: 2020-11-21

## 2020-11-20 RX ORDER — PANTOPRAZOLE SODIUM 40 MG/1
40 TABLET, DELAYED RELEASE ORAL
Status: DISCONTINUED | OUTPATIENT
Start: 2020-11-21 | End: 2020-11-25 | Stop reason: HOSPADM

## 2020-11-20 RX ORDER — METRONIDAZOLE 500 MG/1
500 TABLET ORAL EVERY 8 HOURS SCHEDULED
Status: DISCONTINUED | OUTPATIENT
Start: 2020-11-20 | End: 2020-11-25

## 2020-11-20 RX ORDER — SODIUM CHLORIDE 9 MG/ML
100 INJECTION, SOLUTION INTRAVENOUS CONTINUOUS
Status: DISCONTINUED | OUTPATIENT
Start: 2020-11-21 | End: 2020-11-22

## 2020-11-20 RX ORDER — DOCUSATE SODIUM 100 MG/1
100 CAPSULE, LIQUID FILLED ORAL 2 TIMES DAILY
Status: DISCONTINUED | OUTPATIENT
Start: 2020-11-20 | End: 2020-11-25 | Stop reason: HOSPADM

## 2020-11-20 RX ORDER — LIDOCAINE 50 MG/G
1 PATCH TOPICAL DAILY
Status: DISCONTINUED | OUTPATIENT
Start: 2020-11-21 | End: 2020-11-25 | Stop reason: HOSPADM

## 2020-11-20 RX ORDER — ACETAMINOPHEN 325 MG/1
650 TABLET ORAL ONCE
Status: COMPLETED | OUTPATIENT
Start: 2020-11-20 | End: 2020-11-20

## 2020-11-20 RX ADMIN — DOCUSATE SODIUM 100 MG: 100 CAPSULE ORAL at 18:34

## 2020-11-20 RX ADMIN — PANTOPRAZOLE SODIUM 40 MG: 40 TABLET, DELAYED RELEASE ORAL at 06:10

## 2020-11-20 RX ADMIN — LIDOCAINE 1 PATCH: 50 PATCH TOPICAL at 08:05

## 2020-11-20 RX ADMIN — VANCOMYCIN HYDROCHLORIDE 1500 MG: 10 INJECTION, POWDER, LYOPHILIZED, FOR SOLUTION INTRAVENOUS at 15:19

## 2020-11-20 RX ADMIN — METRONIDAZOLE 500 MG: 500 TABLET ORAL at 15:19

## 2020-11-20 RX ADMIN — POLYETHYLENE GLYCOL 3350 17 G: 17 POWDER, FOR SOLUTION ORAL at 08:50

## 2020-11-20 RX ADMIN — CEFEPIME HYDROCHLORIDE 2000 MG: 2 INJECTION, SOLUTION INTRAVENOUS at 11:36

## 2020-11-20 RX ADMIN — ACETAMINOPHEN 650 MG: 325 TABLET ORAL at 01:49

## 2020-11-20 RX ADMIN — ACETAMINOPHEN 650 MG: 325 TABLET ORAL at 11:43

## 2020-11-20 RX ADMIN — SODIUM CHLORIDE 100 ML/HR: 0.9 INJECTION, SOLUTION INTRAVENOUS at 23:57

## 2020-11-20 RX ADMIN — SODIUM CHLORIDE 100 ML/HR: 0.9 INJECTION, SOLUTION INTRAVENOUS at 18:35

## 2020-11-20 RX ADMIN — VANCOMYCIN HYDROCHLORIDE 1000 MG: 1 INJECTION, SOLUTION INTRAVENOUS at 00:19

## 2020-11-20 RX ADMIN — DOCUSATE SODIUM 100 MG: 100 CAPSULE ORAL at 08:07

## 2020-11-20 RX ADMIN — METRONIDAZOLE 500 MG: 500 TABLET, FILM COATED ORAL at 06:10

## 2020-11-20 RX ADMIN — SENNOSIDES 8.6 MG: 8.6 TABLET ORAL at 22:28

## 2020-11-20 RX ADMIN — METRONIDAZOLE 500 MG: 500 TABLET ORAL at 22:28

## 2020-11-20 RX ADMIN — CEFEPIME HYDROCHLORIDE 2000 MG: 2 INJECTION, POWDER, FOR SOLUTION INTRAVENOUS at 23:57

## 2020-11-20 RX ADMIN — ACETAMINOPHEN 650 MG: 325 TABLET, FILM COATED ORAL at 22:28

## 2020-11-21 ENCOUNTER — ANESTHESIA EVENT (INPATIENT)
Dept: PERIOP | Facility: HOSPITAL | Age: 80
DRG: 163 | End: 2020-11-21
Payer: MEDICARE

## 2020-11-21 ENCOUNTER — APPOINTMENT (INPATIENT)
Dept: RADIOLOGY | Facility: HOSPITAL | Age: 80
DRG: 163 | End: 2020-11-21
Payer: MEDICARE

## 2020-11-21 ENCOUNTER — ANESTHESIA (INPATIENT)
Dept: PERIOP | Facility: HOSPITAL | Age: 80
DRG: 163 | End: 2020-11-21
Payer: MEDICARE

## 2020-11-21 VITALS — HEART RATE: 61 BPM

## 2020-11-21 LAB
ABO GROUP BLD: NORMAL
ABO GROUP BLD: NORMAL
ANION GAP SERPL CALCULATED.3IONS-SCNC: 4 MMOL/L (ref 4–13)
ATRIAL RATE: 63 BPM
BASOPHILS # BLD AUTO: 0.05 THOUSANDS/ΜL (ref 0–0.1)
BASOPHILS NFR BLD AUTO: 1 % (ref 0–1)
BLD GP AB SCN SERPL QL: NEGATIVE
BUN SERPL-MCNC: 23 MG/DL (ref 5–25)
CALCIUM SERPL-MCNC: 8.7 MG/DL (ref 8.3–10.1)
CHLORIDE SERPL-SCNC: 105 MMOL/L (ref 100–108)
CO2 SERPL-SCNC: 27 MMOL/L (ref 21–32)
CREAT SERPL-MCNC: 0.86 MG/DL (ref 0.6–1.3)
EOSINOPHIL # BLD AUTO: 0.27 THOUSAND/ΜL (ref 0–0.61)
EOSINOPHIL NFR BLD AUTO: 3 % (ref 0–6)
ERYTHROCYTE [DISTWIDTH] IN BLOOD BY AUTOMATED COUNT: 13.2 % (ref 11.6–15.1)
GFR SERPL CREATININE-BSD FRML MDRD: 81 ML/MIN/1.73SQ M
GLUCOSE SERPL-MCNC: 121 MG/DL (ref 65–140)
HCT VFR BLD AUTO: 31.7 % (ref 36.5–49.3)
HGB BLD-MCNC: 10.2 G/DL (ref 12–17)
IMM GRANULOCYTES # BLD AUTO: 0.07 THOUSAND/UL (ref 0–0.2)
IMM GRANULOCYTES NFR BLD AUTO: 1 % (ref 0–2)
LYMPHOCYTES # BLD AUTO: 1.35 THOUSANDS/ΜL (ref 0.6–4.47)
LYMPHOCYTES NFR BLD AUTO: 15 % (ref 14–44)
MAGNESIUM SERPL-MCNC: 2.4 MG/DL (ref 1.6–2.6)
MCH RBC QN AUTO: 29.7 PG (ref 26.8–34.3)
MCHC RBC AUTO-ENTMCNC: 32.2 G/DL (ref 31.4–37.4)
MCV RBC AUTO: 92 FL (ref 82–98)
MONOCYTES # BLD AUTO: 1.11 THOUSAND/ΜL (ref 0.17–1.22)
MONOCYTES NFR BLD AUTO: 13 % (ref 4–12)
MRSA NOSE QL CULT: NORMAL
NEUTROPHILS # BLD AUTO: 6.03 THOUSANDS/ΜL (ref 1.85–7.62)
NEUTS SEG NFR BLD AUTO: 67 % (ref 43–75)
NRBC BLD AUTO-RTO: 0 /100 WBCS
P AXIS: 50 DEGREES
PLATELET # BLD AUTO: 312 THOUSANDS/UL (ref 149–390)
PMV BLD AUTO: 8.8 FL (ref 8.9–12.7)
POTASSIUM SERPL-SCNC: 4.1 MMOL/L (ref 3.5–5.3)
PR INTERVAL: 160 MS
QRS AXIS: -61 DEGREES
QRSD INTERVAL: 184 MS
QT INTERVAL: 468 MS
QTC INTERVAL: 478 MS
RBC # BLD AUTO: 3.43 MILLION/UL (ref 3.88–5.62)
RH BLD: POSITIVE
RH BLD: POSITIVE
SODIUM SERPL-SCNC: 136 MMOL/L (ref 136–145)
SPECIMEN EXPIRATION DATE: NORMAL
T WAVE AXIS: 96 DEGREES
VENTRICULAR RATE: 63 BPM
WBC # BLD AUTO: 8.88 THOUSAND/UL (ref 4.31–10.16)

## 2020-11-21 PROCEDURE — 87176 TISSUE HOMOGENIZATION CULTR: CPT | Performed by: THORACIC SURGERY (CARDIOTHORACIC VASCULAR SURGERY)

## 2020-11-21 PROCEDURE — 87116 MYCOBACTERIA CULTURE: CPT | Performed by: THORACIC SURGERY (CARDIOTHORACIC VASCULAR SURGERY)

## 2020-11-21 PROCEDURE — 87206 SMEAR FLUORESCENT/ACID STAI: CPT | Performed by: THORACIC SURGERY (CARDIOTHORACIC VASCULAR SURGERY)

## 2020-11-21 PROCEDURE — 83735 ASSAY OF MAGNESIUM: CPT | Performed by: PHYSICIAN ASSISTANT

## 2020-11-21 PROCEDURE — 86901 BLOOD TYPING SEROLOGIC RH(D): CPT | Performed by: INTERNAL MEDICINE

## 2020-11-21 PROCEDURE — 99232 SBSQ HOSP IP/OBS MODERATE 35: CPT | Performed by: INTERNAL MEDICINE

## 2020-11-21 PROCEDURE — 88305 TISSUE EXAM BY PATHOLOGIST: CPT | Performed by: PATHOLOGY

## 2020-11-21 PROCEDURE — 99024 POSTOP FOLLOW-UP VISIT: CPT | Performed by: THORACIC SURGERY (CARDIOTHORACIC VASCULAR SURGERY)

## 2020-11-21 PROCEDURE — 32652 THORACOSCOPY REM TOTL CORTEX: CPT | Performed by: THORACIC SURGERY (CARDIOTHORACIC VASCULAR SURGERY)

## 2020-11-21 PROCEDURE — 85025 COMPLETE CBC W/AUTO DIFF WBC: CPT | Performed by: INTERNAL MEDICINE

## 2020-11-21 PROCEDURE — 86900 BLOOD TYPING SEROLOGIC ABO: CPT | Performed by: INTERNAL MEDICINE

## 2020-11-21 PROCEDURE — 87070 CULTURE OTHR SPECIMN AEROBIC: CPT | Performed by: THORACIC SURGERY (CARDIOTHORACIC VASCULAR SURGERY)

## 2020-11-21 PROCEDURE — 87075 CULTR BACTERIA EXCEPT BLOOD: CPT | Performed by: THORACIC SURGERY (CARDIOTHORACIC VASCULAR SURGERY)

## 2020-11-21 PROCEDURE — 93010 ELECTROCARDIOGRAM REPORT: CPT | Performed by: INTERNAL MEDICINE

## 2020-11-21 PROCEDURE — C9290 INJ, BUPIVACAINE LIPOSOME: HCPCS | Performed by: THORACIC SURGERY (CARDIOTHORACIC VASCULAR SURGERY)

## 2020-11-21 PROCEDURE — 0W9B40Z DRAINAGE OF LEFT PLEURAL CAVITY WITH DRAINAGE DEVICE, PERCUTANEOUS ENDOSCOPIC APPROACH: ICD-10-PCS | Performed by: THORACIC SURGERY (CARDIOTHORACIC VASCULAR SURGERY)

## 2020-11-21 PROCEDURE — 80048 BASIC METABOLIC PNL TOTAL CA: CPT | Performed by: INTERNAL MEDICINE

## 2020-11-21 PROCEDURE — 0WCB4ZZ EXTIRPATION OF MATTER FROM LEFT PLEURAL CAVITY, PERCUTANEOUS ENDOSCOPIC APPROACH: ICD-10-PCS | Performed by: THORACIC SURGERY (CARDIOTHORACIC VASCULAR SURGERY)

## 2020-11-21 PROCEDURE — 86920 COMPATIBILITY TEST SPIN: CPT

## 2020-11-21 PROCEDURE — 86850 RBC ANTIBODY SCREEN: CPT | Performed by: INTERNAL MEDICINE

## 2020-11-21 PROCEDURE — 87102 FUNGUS ISOLATION CULTURE: CPT | Performed by: THORACIC SURGERY (CARDIOTHORACIC VASCULAR SURGERY)

## 2020-11-21 PROCEDURE — 0BJ08ZZ INSPECTION OF TRACHEOBRONCHIAL TREE, VIA NATURAL OR ARTIFICIAL OPENING ENDOSCOPIC: ICD-10-PCS | Performed by: THORACIC SURGERY (CARDIOTHORACIC VASCULAR SURGERY)

## 2020-11-21 PROCEDURE — 87205 SMEAR GRAM STAIN: CPT | Performed by: THORACIC SURGERY (CARDIOTHORACIC VASCULAR SURGERY)

## 2020-11-21 PROCEDURE — 71045 X-RAY EXAM CHEST 1 VIEW: CPT

## 2020-11-21 PROCEDURE — 0BNL4ZZ RELEASE LEFT LUNG, PERCUTANEOUS ENDOSCOPIC APPROACH: ICD-10-PCS | Performed by: THORACIC SURGERY (CARDIOTHORACIC VASCULAR SURGERY)

## 2020-11-21 RX ORDER — ACETAMINOPHEN 325 MG/1
650 TABLET ORAL 4 TIMES DAILY
Status: DISCONTINUED | OUTPATIENT
Start: 2020-11-21 | End: 2020-11-25 | Stop reason: HOSPADM

## 2020-11-21 RX ORDER — HYDROMORPHONE HCL/PF 1 MG/ML
SYRINGE (ML) INJECTION AS NEEDED
Status: DISCONTINUED | OUTPATIENT
Start: 2020-11-21 | End: 2020-11-21

## 2020-11-21 RX ORDER — ROCURONIUM BROMIDE 10 MG/ML
INJECTION, SOLUTION INTRAVENOUS AS NEEDED
Status: DISCONTINUED | OUTPATIENT
Start: 2020-11-21 | End: 2020-11-21

## 2020-11-21 RX ORDER — ONDANSETRON 2 MG/ML
INJECTION INTRAMUSCULAR; INTRAVENOUS AS NEEDED
Status: DISCONTINUED | OUTPATIENT
Start: 2020-11-21 | End: 2020-11-21

## 2020-11-21 RX ORDER — MAGNESIUM HYDROXIDE 1200 MG/15ML
LIQUID ORAL AS NEEDED
Status: DISCONTINUED | OUTPATIENT
Start: 2020-11-21 | End: 2020-11-21 | Stop reason: HOSPADM

## 2020-11-21 RX ORDER — NEOSTIGMINE METHYLSULFATE 1 MG/ML
INJECTION INTRAVENOUS AS NEEDED
Status: DISCONTINUED | OUTPATIENT
Start: 2020-11-21 | End: 2020-11-21

## 2020-11-21 RX ORDER — GLYCOPYRROLATE 0.2 MG/ML
INJECTION INTRAMUSCULAR; INTRAVENOUS AS NEEDED
Status: DISCONTINUED | OUTPATIENT
Start: 2020-11-21 | End: 2020-11-21

## 2020-11-21 RX ORDER — HYDROMORPHONE HCL/PF 1 MG/ML
0.25 SYRINGE (ML) INJECTION
Status: DISCONTINUED | OUTPATIENT
Start: 2020-11-21 | End: 2020-11-21 | Stop reason: HOSPADM

## 2020-11-21 RX ORDER — OXYCODONE HYDROCHLORIDE 5 MG/1
2.5 TABLET ORAL EVERY 4 HOURS PRN
Status: DISCONTINUED | OUTPATIENT
Start: 2020-11-21 | End: 2020-11-23

## 2020-11-21 RX ORDER — KETAMINE HCL IN NACL, ISO-OSM 100MG/10ML
SYRINGE (ML) INJECTION AS NEEDED
Status: DISCONTINUED | OUTPATIENT
Start: 2020-11-21 | End: 2020-11-21

## 2020-11-21 RX ORDER — FENTANYL CITRATE/PF 50 MCG/ML
25 SYRINGE (ML) INJECTION
Status: DISCONTINUED | OUTPATIENT
Start: 2020-11-21 | End: 2020-11-21 | Stop reason: HOSPADM

## 2020-11-21 RX ORDER — OXYCODONE HYDROCHLORIDE 5 MG/1
5 TABLET ORAL EVERY 4 HOURS PRN
Status: DISCONTINUED | OUTPATIENT
Start: 2020-11-21 | End: 2020-11-23

## 2020-11-21 RX ORDER — BUPIVACAINE HYDROCHLORIDE 2.5 MG/ML
INJECTION, SOLUTION EPIDURAL; INFILTRATION; INTRACAUDAL AS NEEDED
Status: DISCONTINUED | OUTPATIENT
Start: 2020-11-21 | End: 2020-11-21 | Stop reason: HOSPADM

## 2020-11-21 RX ORDER — HYDROMORPHONE HCL/PF 1 MG/ML
0.2 SYRINGE (ML) INJECTION
Status: DISCONTINUED | OUTPATIENT
Start: 2020-11-21 | End: 2020-11-25 | Stop reason: HOSPADM

## 2020-11-21 RX ORDER — SODIUM CHLORIDE 9 MG/ML
INJECTION INTRAVENOUS AS NEEDED
Status: DISCONTINUED | OUTPATIENT
Start: 2020-11-21 | End: 2020-11-21 | Stop reason: HOSPADM

## 2020-11-21 RX ORDER — FENTANYL CITRATE 50 UG/ML
INJECTION, SOLUTION INTRAMUSCULAR; INTRAVENOUS AS NEEDED
Status: DISCONTINUED | OUTPATIENT
Start: 2020-11-21 | End: 2020-11-21

## 2020-11-21 RX ORDER — PROPOFOL 10 MG/ML
INJECTION, EMULSION INTRAVENOUS AS NEEDED
Status: DISCONTINUED | OUTPATIENT
Start: 2020-11-21 | End: 2020-11-21

## 2020-11-21 RX ORDER — ONDANSETRON 2 MG/ML
4 INJECTION INTRAMUSCULAR; INTRAVENOUS ONCE AS NEEDED
Status: DISCONTINUED | OUTPATIENT
Start: 2020-11-21 | End: 2020-11-21 | Stop reason: HOSPADM

## 2020-11-21 RX ORDER — DEXAMETHASONE SODIUM PHOSPHATE 10 MG/ML
INJECTION, SOLUTION INTRAMUSCULAR; INTRAVENOUS AS NEEDED
Status: DISCONTINUED | OUTPATIENT
Start: 2020-11-21 | End: 2020-11-21

## 2020-11-21 RX ORDER — SODIUM CHLORIDE 9 MG/ML
INJECTION, SOLUTION INTRAVENOUS CONTINUOUS PRN
Status: DISCONTINUED | OUTPATIENT
Start: 2020-11-21 | End: 2020-11-21

## 2020-11-21 RX ADMIN — LIDOCAINE HYDROCHLORIDE 50 MG: 20 INJECTION, SOLUTION INTRAVENOUS at 10:56

## 2020-11-21 RX ADMIN — SODIUM CHLORIDE 100 ML/HR: 0.9 INJECTION, SOLUTION INTRAVENOUS at 14:39

## 2020-11-21 RX ADMIN — HYDROMORPHONE HYDROCHLORIDE 0.5 MG: 1 INJECTION, SOLUTION INTRAMUSCULAR; INTRAVENOUS; SUBCUTANEOUS at 13:06

## 2020-11-21 RX ADMIN — Medication 50 MG: at 10:56

## 2020-11-21 RX ADMIN — LIDOCAINE 1 PATCH: 50 PATCH TOPICAL at 09:12

## 2020-11-21 RX ADMIN — ONDANSETRON 4 MG: 2 INJECTION INTRAMUSCULAR; INTRAVENOUS at 13:04

## 2020-11-21 RX ADMIN — GLYCOPYRROLATE 0.6 MG: 0.2 INJECTION, SOLUTION INTRAMUSCULAR; INTRAVENOUS at 13:04

## 2020-11-21 RX ADMIN — METRONIDAZOLE 500 MG: 500 TABLET ORAL at 05:33

## 2020-11-21 RX ADMIN — PHENYLEPHRINE HYDROCHLORIDE 20 MCG/MIN: 10 INJECTION INTRAVENOUS at 12:03

## 2020-11-21 RX ADMIN — PHENYLEPHRINE HYDROCHLORIDE 100 MCG: 10 INJECTION INTRAVENOUS at 12:15

## 2020-11-21 RX ADMIN — ACETAMINOPHEN 650 MG: 325 TABLET, FILM COATED ORAL at 22:03

## 2020-11-21 RX ADMIN — ROCURONIUM BROMIDE 10 MG: 50 INJECTION, SOLUTION INTRAVENOUS at 12:29

## 2020-11-21 RX ADMIN — DOCUSATE SODIUM 100 MG: 100 CAPSULE ORAL at 17:47

## 2020-11-21 RX ADMIN — METRONIDAZOLE 500 MG: 500 TABLET ORAL at 22:03

## 2020-11-21 RX ADMIN — PROPOFOL 150 MG: 10 INJECTION, EMULSION INTRAVENOUS at 10:56

## 2020-11-21 RX ADMIN — PHENYLEPHRINE HYDROCHLORIDE 50 MCG: 10 INJECTION INTRAVENOUS at 12:04

## 2020-11-21 RX ADMIN — CEFEPIME HYDROCHLORIDE 2000 MG: 2 INJECTION, POWDER, FOR SOLUTION INTRAVENOUS at 23:08

## 2020-11-21 RX ADMIN — PHENYLEPHRINE HYDROCHLORIDE 100 MCG: 10 INJECTION INTRAVENOUS at 10:57

## 2020-11-21 RX ADMIN — PHENYLEPHRINE HYDROCHLORIDE 100 MCG: 10 INJECTION INTRAVENOUS at 11:25

## 2020-11-21 RX ADMIN — ACETAMINOPHEN 650 MG: 325 TABLET, FILM COATED ORAL at 17:47

## 2020-11-21 RX ADMIN — VANCOMYCIN HYDROCHLORIDE 1500 MG: 10 INJECTION, POWDER, LYOPHILIZED, FOR SOLUTION INTRAVENOUS at 02:49

## 2020-11-21 RX ADMIN — PHENYLEPHRINE HYDROCHLORIDE 150 MCG: 10 INJECTION INTRAVENOUS at 11:08

## 2020-11-21 RX ADMIN — ROCURONIUM BROMIDE 10 MG: 50 INJECTION, SOLUTION INTRAVENOUS at 11:45

## 2020-11-21 RX ADMIN — HYDROMORPHONE HYDROCHLORIDE 0.5 MG: 1 INJECTION, SOLUTION INTRAMUSCULAR; INTRAVENOUS; SUBCUTANEOUS at 11:32

## 2020-11-21 RX ADMIN — SODIUM CHLORIDE: 0.9 INJECTION, SOLUTION INTRAVENOUS at 11:18

## 2020-11-21 RX ADMIN — FENTANYL CITRATE 100 MCG: 50 INJECTION INTRAMUSCULAR; INTRAVENOUS at 10:56

## 2020-11-21 RX ADMIN — PHENYLEPHRINE HYDROCHLORIDE 100 MCG: 10 INJECTION INTRAVENOUS at 11:58

## 2020-11-21 RX ADMIN — SENNOSIDES 8.6 MG: 8.6 TABLET ORAL at 22:03

## 2020-11-21 RX ADMIN — DEXAMETHASONE SODIUM PHOSPHATE 10 MG: 10 INJECTION, SOLUTION INTRAMUSCULAR; INTRAVENOUS at 11:18

## 2020-11-21 RX ADMIN — PANTOPRAZOLE SODIUM 40 MG: 40 TABLET, DELAYED RELEASE ORAL at 05:32

## 2020-11-21 RX ADMIN — NEOSTIGMINE METHYLSULFATE 4 MG: 1 INJECTION INTRAVENOUS at 13:04

## 2020-11-21 RX ADMIN — ROCURONIUM BROMIDE 50 MG: 50 INJECTION, SOLUTION INTRAVENOUS at 10:56

## 2020-11-22 LAB
BACTERIA SPEC BFLD CULT: NO GROWTH
BASOPHILS # BLD AUTO: 0.02 THOUSANDS/ΜL (ref 0–0.1)
BASOPHILS NFR BLD AUTO: 0 % (ref 0–1)
EOSINOPHIL # BLD AUTO: 0.01 THOUSAND/ΜL (ref 0–0.61)
EOSINOPHIL NFR BLD AUTO: 0 % (ref 0–6)
ERYTHROCYTE [DISTWIDTH] IN BLOOD BY AUTOMATED COUNT: 13.2 % (ref 11.6–15.1)
GRAM STN SPEC: NORMAL
GRAM STN SPEC: NORMAL
HCT VFR BLD AUTO: 32.2 % (ref 36.5–49.3)
HGB BLD-MCNC: 9.9 G/DL (ref 12–17)
IMM GRANULOCYTES # BLD AUTO: 0.1 THOUSAND/UL (ref 0–0.2)
IMM GRANULOCYTES NFR BLD AUTO: 1 % (ref 0–2)
LYMPHOCYTES # BLD AUTO: 0.81 THOUSANDS/ΜL (ref 0.6–4.47)
LYMPHOCYTES NFR BLD AUTO: 7 % (ref 14–44)
MCH RBC QN AUTO: 29.2 PG (ref 26.8–34.3)
MCHC RBC AUTO-ENTMCNC: 30.7 G/DL (ref 31.4–37.4)
MCV RBC AUTO: 95 FL (ref 82–98)
MONOCYTES # BLD AUTO: 1.43 THOUSAND/ΜL (ref 0.17–1.22)
MONOCYTES NFR BLD AUTO: 13 % (ref 4–12)
NEUTROPHILS # BLD AUTO: 8.77 THOUSANDS/ΜL (ref 1.85–7.62)
NEUTS SEG NFR BLD AUTO: 79 % (ref 43–75)
NRBC BLD AUTO-RTO: 0 /100 WBCS
PLATELET # BLD AUTO: 300 THOUSANDS/UL (ref 149–390)
PMV BLD AUTO: 8.8 FL (ref 8.9–12.7)
RBC # BLD AUTO: 3.39 MILLION/UL (ref 3.88–5.62)
WBC # BLD AUTO: 11.14 THOUSAND/UL (ref 4.31–10.16)

## 2020-11-22 PROCEDURE — 85025 COMPLETE CBC W/AUTO DIFF WBC: CPT | Performed by: INTERNAL MEDICINE

## 2020-11-22 PROCEDURE — 99024 POSTOP FOLLOW-UP VISIT: CPT | Performed by: THORACIC SURGERY (CARDIOTHORACIC VASCULAR SURGERY)

## 2020-11-22 PROCEDURE — 99232 SBSQ HOSP IP/OBS MODERATE 35: CPT | Performed by: INTERNAL MEDICINE

## 2020-11-22 RX ADMIN — PANTOPRAZOLE SODIUM 40 MG: 40 TABLET, DELAYED RELEASE ORAL at 05:07

## 2020-11-22 RX ADMIN — SENNOSIDES 8.6 MG: 8.6 TABLET ORAL at 21:42

## 2020-11-22 RX ADMIN — CEFEPIME HYDROCHLORIDE 2000 MG: 2 INJECTION, POWDER, FOR SOLUTION INTRAVENOUS at 12:08

## 2020-11-22 RX ADMIN — OXYCODONE HYDROCHLORIDE 5 MG: 5 TABLET ORAL at 21:45

## 2020-11-22 RX ADMIN — LIDOCAINE 1 PATCH: 50 PATCH TOPICAL at 10:08

## 2020-11-22 RX ADMIN — ACETAMINOPHEN 650 MG: 325 TABLET, FILM COATED ORAL at 21:42

## 2020-11-22 RX ADMIN — METRONIDAZOLE 500 MG: 500 TABLET ORAL at 23:00

## 2020-11-22 RX ADMIN — DOCUSATE SODIUM 100 MG: 100 CAPSULE ORAL at 17:26

## 2020-11-22 RX ADMIN — ACETAMINOPHEN 650 MG: 325 TABLET, FILM COATED ORAL at 10:08

## 2020-11-22 RX ADMIN — VANCOMYCIN HYDROCHLORIDE 1500 MG: 10 INJECTION, POWDER, LYOPHILIZED, FOR SOLUTION INTRAVENOUS at 02:37

## 2020-11-22 RX ADMIN — ACETAMINOPHEN 650 MG: 325 TABLET, FILM COATED ORAL at 12:08

## 2020-11-22 RX ADMIN — ACETAMINOPHEN 650 MG: 325 TABLET, FILM COATED ORAL at 17:30

## 2020-11-22 RX ADMIN — METRONIDAZOLE 500 MG: 500 TABLET ORAL at 05:07

## 2020-11-22 RX ADMIN — DOCUSATE SODIUM 100 MG: 100 CAPSULE ORAL at 10:09

## 2020-11-22 RX ADMIN — METRONIDAZOLE 500 MG: 500 TABLET ORAL at 17:28

## 2020-11-23 ENCOUNTER — APPOINTMENT (INPATIENT)
Dept: RADIOLOGY | Facility: HOSPITAL | Age: 80
DRG: 163 | End: 2020-11-23
Payer: MEDICARE

## 2020-11-23 PROBLEM — E87.1 HYPONATREMIA: Status: RESOLVED | Noted: 2020-11-19 | Resolved: 2020-11-23

## 2020-11-23 LAB
ABO GROUP BLD BPU: NORMAL
ABO GROUP BLD BPU: NORMAL
ANION GAP SERPL CALCULATED.3IONS-SCNC: 7 MMOL/L (ref 4–13)
BASOPHILS # BLD AUTO: 0.04 THOUSANDS/ΜL (ref 0–0.1)
BASOPHILS NFR BLD AUTO: 0 % (ref 0–1)
BPU ID: NORMAL
BPU ID: NORMAL
BUN SERPL-MCNC: 19 MG/DL (ref 5–25)
CALCIUM SERPL-MCNC: 8.3 MG/DL (ref 8.3–10.1)
CHLORIDE SERPL-SCNC: 110 MMOL/L (ref 100–108)
CO2 SERPL-SCNC: 23 MMOL/L (ref 21–32)
CREAT SERPL-MCNC: 0.77 MG/DL (ref 0.6–1.3)
CROSSMATCH: NORMAL
CROSSMATCH: NORMAL
EOSINOPHIL # BLD AUTO: 0.25 THOUSAND/ΜL (ref 0–0.61)
EOSINOPHIL NFR BLD AUTO: 2 % (ref 0–6)
ERYTHROCYTE [DISTWIDTH] IN BLOOD BY AUTOMATED COUNT: 13.2 % (ref 11.6–15.1)
GFR SERPL CREATININE-BSD FRML MDRD: 85 ML/MIN/1.73SQ M
GLUCOSE SERPL-MCNC: 106 MG/DL (ref 65–140)
HCT VFR BLD AUTO: 30.1 % (ref 36.5–49.3)
HGB BLD-MCNC: 9.6 G/DL (ref 12–17)
IMM GRANULOCYTES # BLD AUTO: 0.1 THOUSAND/UL (ref 0–0.2)
IMM GRANULOCYTES NFR BLD AUTO: 1 % (ref 0–2)
LYMPHOCYTES # BLD AUTO: 1.72 THOUSANDS/ΜL (ref 0.6–4.47)
LYMPHOCYTES NFR BLD AUTO: 16 % (ref 14–44)
MCH RBC QN AUTO: 29.6 PG (ref 26.8–34.3)
MCHC RBC AUTO-ENTMCNC: 31.9 G/DL (ref 31.4–37.4)
MCV RBC AUTO: 93 FL (ref 82–98)
MONOCYTES # BLD AUTO: 0.97 THOUSAND/ΜL (ref 0.17–1.22)
MONOCYTES NFR BLD AUTO: 9 % (ref 4–12)
NEUTROPHILS # BLD AUTO: 7.6 THOUSANDS/ΜL (ref 1.85–7.62)
NEUTS SEG NFR BLD AUTO: 72 % (ref 43–75)
NRBC BLD AUTO-RTO: 0 /100 WBCS
PLATELET # BLD AUTO: 363 THOUSANDS/UL (ref 149–390)
PMV BLD AUTO: 8.8 FL (ref 8.9–12.7)
POTASSIUM SERPL-SCNC: 3.8 MMOL/L (ref 3.5–5.3)
RBC # BLD AUTO: 3.24 MILLION/UL (ref 3.88–5.62)
SODIUM SERPL-SCNC: 140 MMOL/L (ref 136–145)
UNIT DISPENSE STATUS: NORMAL
UNIT DISPENSE STATUS: NORMAL
UNIT PRODUCT CODE: NORMAL
UNIT PRODUCT CODE: NORMAL
UNIT RH: NORMAL
UNIT RH: NORMAL
WBC # BLD AUTO: 10.68 THOUSAND/UL (ref 4.31–10.16)

## 2020-11-23 PROCEDURE — 99024 POSTOP FOLLOW-UP VISIT: CPT | Performed by: THORACIC SURGERY (CARDIOTHORACIC VASCULAR SURGERY)

## 2020-11-23 PROCEDURE — 71045 X-RAY EXAM CHEST 1 VIEW: CPT

## 2020-11-23 PROCEDURE — 85025 COMPLETE CBC W/AUTO DIFF WBC: CPT | Performed by: INTERNAL MEDICINE

## 2020-11-23 PROCEDURE — 80048 BASIC METABOLIC PNL TOTAL CA: CPT | Performed by: SURGERY

## 2020-11-23 PROCEDURE — 99232 SBSQ HOSP IP/OBS MODERATE 35: CPT | Performed by: PHYSICIAN ASSISTANT

## 2020-11-23 RX ORDER — OXYCODONE HYDROCHLORIDE 10 MG/1
10 TABLET ORAL EVERY 4 HOURS PRN
Status: DISCONTINUED | OUTPATIENT
Start: 2020-11-23 | End: 2020-11-25 | Stop reason: HOSPADM

## 2020-11-23 RX ORDER — GABAPENTIN 100 MG/1
100 CAPSULE ORAL 3 TIMES DAILY
Status: DISCONTINUED | OUTPATIENT
Start: 2020-11-23 | End: 2020-11-25 | Stop reason: HOSPADM

## 2020-11-23 RX ORDER — OXYCODONE HYDROCHLORIDE 5 MG/1
5 TABLET ORAL EVERY 4 HOURS PRN
Status: DISCONTINUED | OUTPATIENT
Start: 2020-11-23 | End: 2020-11-25 | Stop reason: HOSPADM

## 2020-11-23 RX ADMIN — METRONIDAZOLE 500 MG: 500 TABLET ORAL at 13:23

## 2020-11-23 RX ADMIN — GABAPENTIN 100 MG: 100 CAPSULE ORAL at 08:41

## 2020-11-23 RX ADMIN — ACETAMINOPHEN 650 MG: 325 TABLET, FILM COATED ORAL at 21:42

## 2020-11-23 RX ADMIN — GABAPENTIN 100 MG: 100 CAPSULE ORAL at 21:43

## 2020-11-23 RX ADMIN — METRONIDAZOLE 500 MG: 500 TABLET ORAL at 05:26

## 2020-11-23 RX ADMIN — ACETAMINOPHEN 650 MG: 325 TABLET, FILM COATED ORAL at 17:19

## 2020-11-23 RX ADMIN — OXYCODONE HYDROCHLORIDE 5 MG: 5 TABLET ORAL at 05:32

## 2020-11-23 RX ADMIN — HYDROMORPHONE HYDROCHLORIDE 0.2 MG: 1 INJECTION, SOLUTION INTRAMUSCULAR; INTRAVENOUS; SUBCUTANEOUS at 02:35

## 2020-11-23 RX ADMIN — PANTOPRAZOLE SODIUM 40 MG: 40 TABLET, DELAYED RELEASE ORAL at 05:26

## 2020-11-23 RX ADMIN — CEFEPIME HYDROCHLORIDE 2000 MG: 2 INJECTION, POWDER, FOR SOLUTION INTRAVENOUS at 02:31

## 2020-11-23 RX ADMIN — LIDOCAINE 1 PATCH: 50 PATCH TOPICAL at 08:38

## 2020-11-23 RX ADMIN — DOCUSATE SODIUM 100 MG: 100 CAPSULE ORAL at 17:19

## 2020-11-23 RX ADMIN — ACETAMINOPHEN 650 MG: 325 TABLET, FILM COATED ORAL at 13:23

## 2020-11-23 RX ADMIN — SENNOSIDES 8.6 MG: 8.6 TABLET ORAL at 21:43

## 2020-11-23 RX ADMIN — METRONIDAZOLE 500 MG: 500 TABLET ORAL at 21:43

## 2020-11-23 RX ADMIN — ACETAMINOPHEN 650 MG: 325 TABLET, FILM COATED ORAL at 08:38

## 2020-11-23 RX ADMIN — CEFEPIME HYDROCHLORIDE 2000 MG: 2 INJECTION, POWDER, FOR SOLUTION INTRAVENOUS at 14:56

## 2020-11-23 RX ADMIN — DOCUSATE SODIUM 100 MG: 100 CAPSULE ORAL at 08:37

## 2020-11-23 RX ADMIN — GABAPENTIN 100 MG: 100 CAPSULE ORAL at 17:19

## 2020-11-24 ENCOUNTER — APPOINTMENT (INPATIENT)
Dept: RADIOLOGY | Facility: HOSPITAL | Age: 80
DRG: 163 | End: 2020-11-24
Payer: MEDICARE

## 2020-11-24 LAB
BACTERIA BLD CULT: NORMAL
BACTERIA BLD CULT: NORMAL
BACTERIA SPEC ANAEROBE CULT: NO GROWTH
BACTERIA SPEC ANAEROBE CULT: NO GROWTH
BACTERIA TISS AEROBE CULT: NO GROWTH
GRAM STN SPEC: NORMAL

## 2020-11-24 PROCEDURE — 99024 POSTOP FOLLOW-UP VISIT: CPT | Performed by: THORACIC SURGERY (CARDIOTHORACIC VASCULAR SURGERY)

## 2020-11-24 PROCEDURE — 0WPBX0Z REMOVAL OF DRAINAGE DEVICE FROM LEFT PLEURAL CAVITY, EXTERNAL APPROACH: ICD-10-PCS | Performed by: THORACIC SURGERY (CARDIOTHORACIC VASCULAR SURGERY)

## 2020-11-24 PROCEDURE — 71046 X-RAY EXAM CHEST 2 VIEWS: CPT

## 2020-11-24 PROCEDURE — 99232 SBSQ HOSP IP/OBS MODERATE 35: CPT | Performed by: PHYSICIAN ASSISTANT

## 2020-11-24 RX ADMIN — PANTOPRAZOLE SODIUM 40 MG: 40 TABLET, DELAYED RELEASE ORAL at 05:44

## 2020-11-24 RX ADMIN — METRONIDAZOLE 500 MG: 500 TABLET ORAL at 13:17

## 2020-11-24 RX ADMIN — ACETAMINOPHEN 650 MG: 325 TABLET, FILM COATED ORAL at 17:22

## 2020-11-24 RX ADMIN — GABAPENTIN 100 MG: 100 CAPSULE ORAL at 16:13

## 2020-11-24 RX ADMIN — DOCUSATE SODIUM 100 MG: 100 CAPSULE ORAL at 09:48

## 2020-11-24 RX ADMIN — ACETAMINOPHEN 650 MG: 325 TABLET, FILM COATED ORAL at 13:17

## 2020-11-24 RX ADMIN — GABAPENTIN 100 MG: 100 CAPSULE ORAL at 22:20

## 2020-11-24 RX ADMIN — METRONIDAZOLE 500 MG: 500 TABLET ORAL at 05:43

## 2020-11-24 RX ADMIN — SENNOSIDES 8.6 MG: 8.6 TABLET ORAL at 22:19

## 2020-11-24 RX ADMIN — CEFEPIME HYDROCHLORIDE 2000 MG: 2 INJECTION, POWDER, FOR SOLUTION INTRAVENOUS at 02:57

## 2020-11-24 RX ADMIN — ACETAMINOPHEN 650 MG: 325 TABLET, FILM COATED ORAL at 22:18

## 2020-11-24 RX ADMIN — ACETAMINOPHEN 650 MG: 325 TABLET, FILM COATED ORAL at 09:48

## 2020-11-24 RX ADMIN — DOCUSATE SODIUM 100 MG: 100 CAPSULE ORAL at 17:22

## 2020-11-24 RX ADMIN — GABAPENTIN 100 MG: 100 CAPSULE ORAL at 09:48

## 2020-11-24 RX ADMIN — METRONIDAZOLE 500 MG: 500 TABLET ORAL at 22:20

## 2020-11-24 RX ADMIN — CEFEPIME HYDROCHLORIDE 2000 MG: 2 INJECTION, POWDER, FOR SOLUTION INTRAVENOUS at 16:13

## 2020-11-25 ENCOUNTER — APPOINTMENT (INPATIENT)
Dept: RADIOLOGY | Facility: HOSPITAL | Age: 80
DRG: 163 | End: 2020-11-25
Payer: MEDICARE

## 2020-11-25 VITALS
OXYGEN SATURATION: 93 % | BODY MASS INDEX: 28.67 KG/M2 | HEART RATE: 61 BPM | DIASTOLIC BLOOD PRESSURE: 65 MMHG | WEIGHT: 193.56 LBS | SYSTOLIC BLOOD PRESSURE: 130 MMHG | RESPIRATION RATE: 15 BRPM | TEMPERATURE: 98.3 F | HEIGHT: 69 IN

## 2020-11-25 PROCEDURE — 99239 HOSP IP/OBS DSCHRG MGMT >30: CPT | Performed by: PHYSICIAN ASSISTANT

## 2020-11-25 PROCEDURE — 71045 X-RAY EXAM CHEST 1 VIEW: CPT

## 2020-11-25 PROCEDURE — 71046 X-RAY EXAM CHEST 2 VIEWS: CPT

## 2020-11-25 PROCEDURE — 0WPBX0Z REMOVAL OF DRAINAGE DEVICE FROM LEFT PLEURAL CAVITY, EXTERNAL APPROACH: ICD-10-PCS | Performed by: FAMILY MEDICINE

## 2020-11-25 PROCEDURE — 99024 POSTOP FOLLOW-UP VISIT: CPT | Performed by: THORACIC SURGERY (CARDIOTHORACIC VASCULAR SURGERY)

## 2020-11-25 RX ORDER — ACETAMINOPHEN 325 MG/1
650 TABLET ORAL 4 TIMES DAILY
Refills: 0
Start: 2020-11-25 | End: 2021-10-29 | Stop reason: ALTCHOICE

## 2020-11-25 RX ORDER — LIDOCAINE 50 MG/G
1 PATCH TOPICAL DAILY
Refills: 0
Start: 2020-11-26 | End: 2021-02-15

## 2020-11-25 RX ORDER — GABAPENTIN 100 MG/1
100 CAPSULE ORAL 3 TIMES DAILY
Qty: 90 CAPSULE | Refills: 0 | Status: SHIPPED | OUTPATIENT
Start: 2020-11-25 | End: 2021-09-03 | Stop reason: CLARIF

## 2020-11-25 RX ADMIN — LIDOCAINE 1 PATCH: 50 PATCH TOPICAL at 08:26

## 2020-11-25 RX ADMIN — GABAPENTIN 100 MG: 100 CAPSULE ORAL at 08:26

## 2020-11-25 RX ADMIN — PANTOPRAZOLE SODIUM 40 MG: 40 TABLET, DELAYED RELEASE ORAL at 06:25

## 2020-11-25 RX ADMIN — METRONIDAZOLE 500 MG: 500 TABLET ORAL at 06:26

## 2020-11-25 RX ADMIN — DOCUSATE SODIUM 100 MG: 100 CAPSULE ORAL at 08:26

## 2020-11-25 RX ADMIN — ACETAMINOPHEN 650 MG: 325 TABLET, FILM COATED ORAL at 08:26

## 2020-11-25 RX ADMIN — OXYCODONE HYDROCHLORIDE 10 MG: 10 TABLET ORAL at 06:28

## 2020-11-30 ENCOUNTER — TELEPHONE (OUTPATIENT)
Dept: CARDIAC SURGERY | Facility: CLINIC | Age: 80
End: 2020-11-30

## 2020-12-07 ENCOUNTER — HOSPITAL ENCOUNTER (OUTPATIENT)
Dept: RADIOLOGY | Facility: HOSPITAL | Age: 80
Discharge: HOME | End: 2020-12-07
Payer: MEDICARE

## 2020-12-07 ENCOUNTER — TELEPHONE (OUTPATIENT)
Dept: CARDIAC SURGERY | Facility: CLINIC | Age: 80
End: 2020-12-07

## 2020-12-07 DIAGNOSIS — J90 PLEURAL EFFUSION: ICD-10-CM

## 2020-12-07 DIAGNOSIS — J90 PLEURAL EFFUSION: Primary | ICD-10-CM

## 2020-12-07 PROCEDURE — 71046 X-RAY EXAM CHEST 2 VIEWS: CPT

## 2020-12-09 ENCOUNTER — OFFICE VISIT (OUTPATIENT)
Dept: CARDIAC SURGERY | Facility: CLINIC | Age: 80
End: 2020-12-09

## 2020-12-09 VITALS
TEMPERATURE: 97.6 F | SYSTOLIC BLOOD PRESSURE: 130 MMHG | DIASTOLIC BLOOD PRESSURE: 71 MMHG | WEIGHT: 175.4 LBS | BODY MASS INDEX: 25.98 KG/M2 | HEART RATE: 68 BPM | OXYGEN SATURATION: 95 % | HEIGHT: 69 IN

## 2020-12-09 DIAGNOSIS — J94.2 HEMOTHORAX ON LEFT: Primary | ICD-10-CM

## 2020-12-09 DIAGNOSIS — R22.42 LOCALIZED SWELLING OF LEFT LOWER LEG: ICD-10-CM

## 2020-12-09 PROCEDURE — 99024 POSTOP FOLLOW-UP VISIT: CPT | Performed by: PHYSICIAN ASSISTANT

## 2020-12-10 ENCOUNTER — TELEPHONE (OUTPATIENT)
Dept: OTHER | Facility: OTHER | Age: 80
End: 2020-12-10

## 2020-12-10 ENCOUNTER — DOCUMENTATION (OUTPATIENT)
Dept: CARDIAC SURGERY | Facility: CLINIC | Age: 80
End: 2020-12-10

## 2020-12-10 ENCOUNTER — HOSPITAL ENCOUNTER (OUTPATIENT)
Dept: NON INVASIVE DIAGNOSTICS | Facility: HOSPITAL | Age: 80
Discharge: HOME | End: 2020-12-10
Payer: MEDICARE

## 2020-12-10 DIAGNOSIS — R22.42 LOCALIZED SWELLING OF LEFT LOWER LEG: ICD-10-CM

## 2020-12-10 PROCEDURE — 93970 EXTREMITY STUDY: CPT

## 2020-12-10 PROCEDURE — 93970 EXTREMITY STUDY: CPT | Performed by: SURGERY

## 2020-12-21 LAB
FUNGUS SPEC CULT: NORMAL
FUNGUS SPEC CULT: NORMAL

## 2021-01-05 LAB
MYCOBACTERIUM SPEC CULT: NORMAL
MYCOBACTERIUM SPEC CULT: NORMAL
RHODAMINE-AURAMINE STN SPEC: NORMAL
RHODAMINE-AURAMINE STN SPEC: NORMAL

## 2021-01-20 ENCOUNTER — IMMUNIZATIONS (OUTPATIENT)
Dept: FAMILY MEDICINE CLINIC | Facility: HOSPITAL | Age: 81
End: 2021-01-20

## 2021-01-20 DIAGNOSIS — Z23 ENCOUNTER FOR IMMUNIZATION: Primary | ICD-10-CM

## 2021-01-20 PROCEDURE — 0011A SARS-COV-2 / COVID-19 MRNA VACCINE (MODERNA) 100 MCG: CPT | Performed by: EMERGENCY MEDICINE

## 2021-01-20 PROCEDURE — 91301 SARS-COV-2 / COVID-19 MRNA VACCINE (MODERNA) 100 MCG: CPT | Performed by: EMERGENCY MEDICINE

## 2021-02-15 ENCOUNTER — APPOINTMENT (EMERGENCY)
Dept: RADIOLOGY | Facility: HOSPITAL | Age: 81
End: 2021-02-15
Payer: MEDICARE

## 2021-02-15 ENCOUNTER — HOSPITAL ENCOUNTER (EMERGENCY)
Facility: HOSPITAL | Age: 81
Discharge: HOME | End: 2021-02-16
Attending: EMERGENCY MEDICINE | Admitting: EMERGENCY MEDICINE
Payer: MEDICARE

## 2021-02-15 DIAGNOSIS — R79.89 ABNORMAL SERUM THYROID STIMULATING HORMONE (TSH) LEVEL: ICD-10-CM

## 2021-02-15 DIAGNOSIS — R06.02 SOB (SHORTNESS OF BREATH): Primary | ICD-10-CM

## 2021-02-15 DIAGNOSIS — R06.00 DYSPNEA ON EXERTION: ICD-10-CM

## 2021-02-15 LAB
ALBUMIN SERPL BCP-MCNC: 3.3 G/DL (ref 3.5–5)
ALP SERPL-CCNC: 68 U/L (ref 46–116)
ALT SERPL W P-5'-P-CCNC: 19 U/L (ref 12–78)
ANION GAP SERPL CALCULATED.3IONS-SCNC: 8 MMOL/L (ref 4–13)
AST SERPL W P-5'-P-CCNC: 25 U/L (ref 5–45)
BASOPHILS # BLD AUTO: 0.08 THOUSANDS/ΜL (ref 0–0.1)
BASOPHILS NFR BLD AUTO: 1 % (ref 0–1)
BILIRUB SERPL-MCNC: 0.4 MG/DL (ref 0.2–1)
BUN SERPL-MCNC: 19 MG/DL (ref 5–25)
CALCIUM ALBUM COR SERPL-MCNC: 9.1 MG/DL (ref 8.3–10.1)
CALCIUM SERPL-MCNC: 8.5 MG/DL (ref 8.3–10.1)
CHLORIDE SERPL-SCNC: 104 MMOL/L (ref 100–108)
CO2 SERPL-SCNC: 27 MMOL/L (ref 21–32)
CREAT SERPL-MCNC: 1.08 MG/DL (ref 0.6–1.3)
EOSINOPHIL # BLD AUTO: 0.18 THOUSAND/ΜL (ref 0–0.61)
EOSINOPHIL NFR BLD AUTO: 3 % (ref 0–6)
ERYTHROCYTE [DISTWIDTH] IN BLOOD BY AUTOMATED COUNT: 13.8 % (ref 11.6–15.1)
GFR SERPL CREATININE-BSD FRML MDRD: 64 ML/MIN/1.73SQ M
GLUCOSE SERPL-MCNC: 91 MG/DL (ref 65–140)
HCT VFR BLD AUTO: 40.6 % (ref 36.5–49.3)
HGB BLD-MCNC: 13.5 G/DL (ref 12–17)
IMM GRANULOCYTES # BLD AUTO: 0.03 THOUSAND/UL (ref 0–0.2)
IMM GRANULOCYTES NFR BLD AUTO: 1 % (ref 0–2)
LYMPHOCYTES # BLD AUTO: 2.46 THOUSANDS/ΜL (ref 0.6–4.47)
LYMPHOCYTES NFR BLD AUTO: 41 % (ref 14–44)
MCH RBC QN AUTO: 29 PG (ref 26.8–34.3)
MCHC RBC AUTO-ENTMCNC: 33.3 G/DL (ref 31.4–37.4)
MCV RBC AUTO: 87 FL (ref 82–98)
MONOCYTES # BLD AUTO: 0.78 THOUSAND/ΜL (ref 0.17–1.22)
MONOCYTES NFR BLD AUTO: 13 % (ref 4–12)
NEUTROPHILS # BLD AUTO: 2.43 THOUSANDS/ΜL (ref 1.85–7.62)
NEUTS SEG NFR BLD AUTO: 41 % (ref 43–75)
NRBC BLD AUTO-RTO: 0 /100 WBCS
PLATELET # BLD AUTO: 230 THOUSANDS/UL (ref 149–390)
PMV BLD AUTO: 9.8 FL (ref 8.9–12.7)
POTASSIUM SERPL-SCNC: 4.4 MMOL/L (ref 3.5–5.3)
PROT SERPL-MCNC: 7.1 G/DL (ref 6.4–8.2)
RBC # BLD AUTO: 4.66 MILLION/UL (ref 3.88–5.62)
SODIUM SERPL-SCNC: 139 MMOL/L (ref 136–145)
TROPONIN I SERPL-MCNC: <0.02 NG/ML
WBC # BLD AUTO: 5.96 THOUSAND/UL (ref 4.31–10.16)

## 2021-02-15 PROCEDURE — 99285 EMERGENCY DEPT VISIT HI MDM: CPT | Performed by: EMERGENCY MEDICINE

## 2021-02-15 PROCEDURE — 99285 EMERGENCY DEPT VISIT HI MDM: CPT

## 2021-02-15 PROCEDURE — 36415 COLL VENOUS BLD VENIPUNCTURE: CPT | Performed by: EMERGENCY MEDICINE

## 2021-02-15 PROCEDURE — 83880 ASSAY OF NATRIURETIC PEPTIDE: CPT | Performed by: EMERGENCY MEDICINE

## 2021-02-15 PROCEDURE — 85025 COMPLETE CBC W/AUTO DIFF WBC: CPT | Performed by: EMERGENCY MEDICINE

## 2021-02-15 PROCEDURE — 83735 ASSAY OF MAGNESIUM: CPT | Performed by: EMERGENCY MEDICINE

## 2021-02-15 PROCEDURE — 83690 ASSAY OF LIPASE: CPT | Performed by: EMERGENCY MEDICINE

## 2021-02-15 PROCEDURE — 80053 COMPREHEN METABOLIC PANEL: CPT | Performed by: EMERGENCY MEDICINE

## 2021-02-15 PROCEDURE — 84484 ASSAY OF TROPONIN QUANT: CPT | Performed by: EMERGENCY MEDICINE

## 2021-02-15 PROCEDURE — 84443 ASSAY THYROID STIM HORMONE: CPT | Performed by: EMERGENCY MEDICINE

## 2021-02-15 PROCEDURE — 82550 ASSAY OF CK (CPK): CPT | Performed by: EMERGENCY MEDICINE

## 2021-02-15 PROCEDURE — 93005 ELECTROCARDIOGRAM TRACING: CPT

## 2021-02-15 PROCEDURE — 71046 X-RAY EXAM CHEST 2 VIEWS: CPT

## 2021-02-15 RX ORDER — PANTOPRAZOLE SODIUM 40 MG/1
40 TABLET, DELAYED RELEASE ORAL DAILY
COMMUNITY
Start: 2019-02-22 | End: 2021-10-29 | Stop reason: ALTCHOICE

## 2021-02-15 RX ORDER — APIXABAN 5 MG/1
5 TABLET, FILM COATED ORAL 2 TIMES DAILY
COMMUNITY
Start: 2020-12-28 | End: 2021-10-29 | Stop reason: SDUPTHER

## 2021-02-15 RX ORDER — SODIUM CHLORIDE 9 MG/ML
3 INJECTION INTRAVENOUS
Status: DISCONTINUED | OUTPATIENT
Start: 2021-02-15 | End: 2021-02-16 | Stop reason: HOSPADM

## 2021-02-16 VITALS
BODY MASS INDEX: 27.18 KG/M2 | DIASTOLIC BLOOD PRESSURE: 71 MMHG | SYSTOLIC BLOOD PRESSURE: 154 MMHG | TEMPERATURE: 97.5 F | HEART RATE: 62 BPM | OXYGEN SATURATION: 97 % | RESPIRATION RATE: 20 BRPM | WEIGHT: 184.08 LBS

## 2021-02-16 LAB
CK SERPL-CCNC: 93 U/L (ref 39–308)
D DIMER PPP FEU-MCNC: 0.48 UG/ML FEU
INR PPP: 1.12 (ref 0.84–1.19)
LIPASE SERPL-CCNC: 111 U/L (ref 73–393)
MAGNESIUM SERPL-MCNC: 1.9 MG/DL (ref 1.6–2.6)
NT-PROBNP SERPL-MCNC: 103 PG/ML
PROTHROMBIN TIME: 14.2 SECONDS (ref 11.6–14.5)
TSH SERPL DL<=0.05 MIU/L-ACNC: 7.64 UIU/ML (ref 0.36–3.74)

## 2021-02-16 PROCEDURE — 85610 PROTHROMBIN TIME: CPT | Performed by: EMERGENCY MEDICINE

## 2021-02-16 PROCEDURE — 85379 FIBRIN DEGRADATION QUANT: CPT | Performed by: EMERGENCY MEDICINE

## 2021-02-16 PROCEDURE — 36415 COLL VENOUS BLD VENIPUNCTURE: CPT | Performed by: EMERGENCY MEDICINE

## 2021-02-16 NOTE — ED NOTES
Ambulated patient in the hallway and SpO2 stayed at 98% on RA  Notified Dr Chirag Mcgovern MD of results        Jonathan Boykin RN  02/16/21 [id number]

## 2021-02-16 NOTE — ED PROVIDER NOTES
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, bladder tumor, hyperlipidemia, pleural effusion, left hemothorax, multiple rib fractures, mitral regurgitation, tachy-cinda syndrome, dyspnea status post pacemaker presents with lightheadedness and dyspnea on exertion this evening  Patient states he was walking up the stairs and felt suddenly short of breath  Denies history of myocardial infarction, stents, congestive heart failure or pulmonary embolism  Patient denies symptoms at this time or at rest  Patient denies fever, chills, dizziness, diaphoresis, loss of taste or smell, headache, neck stiffness, sore throat, cough, sputum production, nausea, vomiting, chest pain, back pain, rash, abdominal pain, urinary symptoms, penile discharge, scrotal swelling, focal motor or sensory deficits, lower extremity swelling or pain, diarrhea, bloody stools or constipation  Review of medical chart shows no recent illnesses or hospitalizations  Patient was admitted on November 8, 2020 status post fall with multiple rib fractures and subsequent left hemothorax  No recent medication changes  No history of stroke or transient ischemic attack  No known COVID-19 exposure  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 due to COVID-19 pandemic including bonnet, eye goggles, face mask, gown and gloves            History provided by:  Patient and medical records   used: No    Shortness of Breath  Severity:  Mild  Duration:  1 hour  Timing:  Intermittent  Progression:  Partially resolved  Chronicity:  New  Context: activity    Context: not animal exposure, not emotional upset, not fumes, not known allergens, not occupational exposure, not pollens, not smoke exposure, not strong odors, not URI and not weather changes    Relieved by:  Nothing  Worsened by: Activity and exertion  Ineffective treatments:  Rest  Associated symptoms: no abdominal pain, no chest pain, no claudication, no cough, no diaphoresis, no ear pain, no fever, no headaches, no hemoptysis, no neck pain, no PND, no rash, no sore throat, no sputum production, no syncope, no swollen glands, no vomiting and no wheezing    Risk factors: hx of PE/DVT    Risk factors: no recent alcohol use, no family hx of DVT, no hx of cancer, no obesity, no oral contraceptive use, no prolonged immobilization, no recent surgery and no tobacco use        Prior to Admission Medications   Prescriptions Last Dose Informant Patient Reported? Taking?    Eliquis 5 MG   Yes No   acetaminophen (TYLENOL) 325 mg tablet Unknown at Unknown time  No No   Sig: Take 2 tablets (650 mg total) by mouth 4 (four) times a day   gabapentin (NEURONTIN) 100 mg capsule 2/15/2021 at Unknown time  No Yes   Sig: Take 1 capsule (100 mg total) by mouth 3 (three) times a day   pantoprazole (Protonix) 40 mg tablet   Yes Yes   Sig: Take by mouth   simvastatin (ZOCOR) 40 mg tablet 2/15/2021 at Unknown time  Yes Yes      Facility-Administered Medications: None       Past Medical History:   Diagnosis Date    Bladder tumor     Cough     Dyspnea     Hyperlipidemia     Left knee pain     Pacemaker     Pleural effusion     Right knee pain        Past Surgical History:   Procedure Laterality Date    BLADDER TUMOR EXCISION      CARDIAC PACEMAKER PLACEMENT      HERNIA REPAIR      X2    THORACOSCOPY VIDEO ASSISTED SURGERY (VATS) Left 11/21/2020    Procedure: THORACOSCOPY VIDEO ASSISTED SURGERY (VATS), washout of hemothorax, decortication;  Surgeon: William Blankenship MD;  Location: [facility];  Service: Thoracic    TONSILLECTOMY         Family History   Problem Relation Age of Onset    Lung cancer Mother     Colon cancer Father     COPD Sister      I have reviewed and agree with the history as documented  E-Cigarette/Vaping    E-Cigarette Use Never User      E-Cigarette/Vaping Substances    Nicotine No     THC No     CBD No     Flavoring No      Social History     Tobacco Use    Smoking status: Never Smoker    Smokeless tobacco: Never Used   Substance Use Topics    Alcohol use: Not Currently    Drug use: Not Currently       Review of Systems   Constitutional: Negative for chills, diaphoresis, fatigue and fever  HENT: Negative for congestion, drooling, ear pain, facial swelling, nosebleeds, sneezing, sore throat, trouble swallowing and voice change  Eyes: Negative for photophobia, pain and visual disturbance  Respiratory: Positive for shortness of breath  Negative for cough, hemoptysis, sputum production, chest tightness and wheezing  Cardiovascular: Negative for chest pain, palpitations, claudication, leg swelling, syncope and PND  Gastrointestinal: Negative for abdominal pain, constipation, diarrhea, nausea and vomiting  Genitourinary: Negative for decreased urine volume, difficulty urinating, discharge, dysuria, flank pain, frequency, hematuria, penile pain, penile swelling, scrotal swelling, testicular pain and urgency  Musculoskeletal: Negative for arthralgias, back pain, myalgias, neck pain and neck stiffness  Skin: Negative for color change, pallor, rash and wound  Allergic/Immunologic: Negative for immunocompromised state  Neurological: Positive for light-headedness  Negative for dizziness, tremors, seizures, syncope, facial asymmetry, speech difficulty, weakness, numbness and headaches  Hematological: Negative for adenopathy  Psychiatric/Behavioral: Negative for agitation, confusion, hallucinations and suicidal ideas  The patient is not nervous/anxious  Physical Exam  Physical Exam  Vitals signs and nursing note reviewed  Exam conducted with a chaperone present  Constitutional:       General: He is not in acute distress       Appearance: Normal appearance  He is well-developed and normal weight  He is not ill-appearing, toxic-appearing or diaphoretic  HENT:      Head: Normocephalic and atraumatic  Jaw: There is normal jaw occlusion  Right Ear: Hearing, tympanic membrane, ear canal and external ear normal  There is no impacted cerumen  No mastoid tenderness  No hemotympanum  Left Ear: Hearing, tympanic membrane, ear canal and external ear normal  There is no impacted cerumen  No mastoid tenderness  No hemotympanum  Nose: Nose normal       Right Nostril: No epistaxis  Left Nostril: No epistaxis  Right Sinus: No maxillary sinus tenderness or frontal sinus tenderness  Left Sinus: No maxillary sinus tenderness or frontal sinus tenderness  Mouth/Throat:      Lips: Pink  No lesions  Mouth: Mucous membranes are moist  No lacerations or angioedema  Tongue: No lesions  Tongue does not deviate from midline  Palate: No mass and lesions  Pharynx: Oropharynx is clear  Uvula midline  No pharyngeal swelling, oropharyngeal exudate, posterior oropharyngeal erythema or uvula swelling  Tonsils: No tonsillar exudate or tonsillar abscesses  Eyes:      General: Lids are normal  Vision grossly intact  Gaze aligned appropriately  No visual field deficit or scleral icterus  Right eye: No discharge  Left eye: No discharge  Extraocular Movements: Extraocular movements intact  Right eye: No nystagmus  Left eye: No nystagmus  Conjunctiva/sclera: Conjunctivae normal       Right eye: Right conjunctiva is not injected  Left eye: Left conjunctiva is not injected  Pupils: Pupils are equal, round, and reactive to light  Neck:      Musculoskeletal: Full passive range of motion without pain, normal range of motion and neck supple  No neck rigidity, spinous process tenderness or muscular tenderness  Thyroid: No thyroid mass or thyromegaly        Trachea: Trachea and phonation normal    Cardiovascular:      Rate and Rhythm: Normal rate and regular rhythm  Pulses: Normal pulses  Radial pulses are 2+ on the right side and 2+ on the left side  Dorsalis pedis pulses are 2+ on the right side and 2+ on the left side  Heart sounds: Normal heart sounds, S1 normal and S2 normal    Pulmonary:      Effort: Pulmonary effort is normal  No tachypnea, accessory muscle usage, respiratory distress or retractions  Breath sounds: Normal breath sounds and air entry  No stridor or decreased air movement  No decreased breath sounds, wheezing, rhonchi or rales  Chest:      Chest wall: No tenderness  Comments: Pacemaker and right upper chest with no overlying skin discoloration or erythema; no chest wall tenderness  Abdominal:      General: Abdomen is flat  Bowel sounds are normal  There is no distension  Palpations: Abdomen is soft  Abdomen is not rigid  There is no mass  Tenderness: There is no abdominal tenderness  There is no right CVA tenderness, left CVA tenderness, guarding or rebound  Negative signs include Loyola's sign and McBurney's sign  Hernia: No hernia is present  Musculoskeletal: Normal range of motion  General: No swelling, tenderness, deformity or signs of injury  Right lower leg: He exhibits no tenderness  No edema  Left lower leg: He exhibits no tenderness  Edema present  Left foot: Normal range of motion and normal capillary refill  Swelling present  No tenderness, bony tenderness, crepitus, deformity or laceration  Comments: Minimal edema to left dorsal foot with no overlying erythema, warmth; +2 dorsalis pedis pulse of left lower extremity with full motor and sensation intact, good cap refill less than 2 seconds; no calf tenderness   Lymphadenopathy:      Cervical: No cervical adenopathy  Skin:     General: Skin is warm and dry  Capillary Refill: Capillary refill takes less than 2 seconds  Coloration: Skin is not ashen, cyanotic, jaundiced, mottled, pale or sallow  Findings: No abrasion, abscess, acne, bruising, burn, ecchymosis, erythema, signs of injury, laceration, lesion, petechiae, rash or wound  Rash is not macular or papular  Nails: There is no clubbing  Neurological:      General: No focal deficit present  Mental Status: He is alert and oriented to person, place, and time  Mental status is at baseline  He is not disoriented  GCS: GCS eye subscore is 4  GCS verbal subscore is 5  GCS motor subscore is 6  Cranial Nerves: Cranial nerves are intact  No cranial nerve deficit, dysarthria or facial asymmetry  Sensory: Sensation is intact  No sensory deficit  Motor: Motor function is intact  No weakness, tremor, atrophy, abnormal muscle tone or seizure activity  Coordination: Coordination is intact  Coordination normal       Gait: Gait is intact  Gait normal       Comments: Patient is AAOx4, GCS 15; speaking clearly and appropriately; motor and sensation intact; visual fields intact; cranial nerves II-XII grossly intact; no facial droop, slurred speech or arm drift   Psychiatric:         Attention and Perception: Attention and perception normal  He is attentive  Mood and Affect: Mood and affect normal          Speech: Speech normal          Behavior: Behavior normal  Behavior is cooperative  Thought Content:  Thought content normal          Cognition and Memory: Cognition and memory normal          Judgment: Judgment normal          Vital Signs  ED Triage Vitals   Temperature Pulse Respirations Blood Pressure SpO2   02/15/21 2309 02/15/21 2309 02/15/21 2309 02/15/21 2309 02/15/21 2309   97 5 °F (36 4 °C) 69 20 150/78 96 %      Temp Source Heart Rate Source Patient Position - Orthostatic VS BP Location FiO2 (%)   02/15/21 2309 02/15/21 2309 02/15/21 2309 02/15/21 2309 --   Oral Monitor Lying Left arm       Pain Score       02/15/21 2324 No Pain           Vitals:    02/15/21 2315 02/16/21 0000 02/16/21 0030 02/16/21 0100   BP: 150/78 151/72 132/68 154/71   Pulse: 68 64 60 62   Patient Position - Orthostatic VS:             Visual Acuity      ED Medications  Medications - No data to display    Diagnostic Studies  Results Reviewed     Procedure Component Value Units Date/Time    D-dimer, quantitative [[id number]]  (Normal) Collected: 02/16/21 0000    Lab Status: Final result Specimen: Blood from Arm, Right Updated: 02/16/21 0022     D-Dimer, Quant 0 48 ug/ml FEU     Protime-INR [[id number]]  (Normal) Collected: 02/16/21 0000    Lab Status: Final result Specimen: Blood from Arm, Right Updated: 02/16/21 0020     Protime 14 2 seconds      INR 1 12    Lipase [[id number]]  (Normal) Collected: 02/15/21 2321    Lab Status: Final result Specimen: Blood from Arm, Left Updated: 02/16/21 0015     Lipase 111 u/L     TSH, 3rd generation [[id number]]  (Abnormal) Collected: 02/15/21 2321    Lab Status: Final result Specimen: Blood from Arm, Left Updated: 02/16/21 0015     TSH 3RD GENERATON 7 639 uIU/mL     Narrative:      Patients undergoing fluorescein dye angiography may retain small amounts of fluorescein in the body for 48-72 hours post procedure  Samples containing fluorescein can produce falsely depressed TSH values  If the patient had this procedure,a specimen should be resubmitted post fluorescein clearance        NT-BNP PRO [[id number]]  (Normal) Collected: 02/15/21 2321    Lab Status: Final result Specimen: Blood from Arm, Left Updated: 02/16/21 0015     NT-proBNP 103 pg/mL     Magnesium [[id number]]  (Normal) Collected: 02/15/21 2321    Lab Status: Final result Specimen: Blood from Arm, Left Updated: 02/16/21 0015     Magnesium 1 9 mg/dL     CK (with reflex to MB) [[id number]]  (Normal) Collected: 02/15/21 2321    Lab Status: Final result Specimen: Blood from Arm, Left Updated: 02/16/21 0015     Total CK 93 U/L     Comprehensive metabolic panel [[id number]] (Abnormal) Collected: 02/15/21 2321    Lab Status: Final result Specimen: Blood from Arm, Left Updated: 02/15/21 2352     Sodium 139 mmol/L      Potassium 4 4 mmol/L      Chloride 104 mmol/L      CO2 27 mmol/L      ANION GAP 8 mmol/L      BUN 19 mg/dL      Creatinine 1 08 mg/dL      Glucose 91 mg/dL      Calcium 8 5 mg/dL      Corrected Calcium 9 1 mg/dL      AST 25 U/L      ALT 19 U/L      Alkaline Phosphatase 68 U/L      Total Protein 7 1 g/dL      Albumin 3 3 g/dL      Total Bilirubin 0 40 mg/dL      eGFR 64 ml/min/1 73sq m     Narrative:      National Kid[employer] Disease Foundation guidelines for Chronic Kidney Disease (CKD):     Stage 1 with normal or high GFR (GFR > 90 mL/min/1 73 square meters)    Stage 2 Mild CKD (GFR = 60-89 mL/min/1 73 square meters)    Stage 3A Moderate CKD (GFR = 45-59 mL/min/1 73 square meters)    Stage 3B Moderate CKD (GFR = 30-44 mL/min/1 73 square meters)    Stage 4 Severe CKD (GFR = 15-29 mL/min/1 73 square meters)    Stage 5 End Stage CKD (GFR <15 mL/min/1 73 square meters)  Note: GFR calculation is accurate only with a steady state creatinine    Troponin I [[id number]]  (Normal) Collected: 02/15/21 2321    Lab Status: Final result Specimen: Blood from Arm, Left Updated: 02/15/21 2350     Troponin I <0 02 ng/mL     CBC and differential [[id number]]  (Abnormal) Collected: 02/15/21 2321    Lab Status: Final result Specimen: Blood from Arm, Left Updated: 02/15/21 2331     WBC 5 96 Thousand/uL      RBC 4 66 Million/uL      Hemoglobin 13 5 g/dL      Hematocrit 40 6 %      MCV 87 fL      MCH 29 0 pg      MCHC 33 3 g/dL      RDW 13 8 %      MPV 9 8 fL      Platelets 250 Thousands/uL      nRBC 0 /100 WBCs      Neutrophils Relative 41 %      Immat GRANS % 1 %      Lymphocytes Relative 41 %      Monocytes Relative 13 %      Eosinophils Relative 3 %      Basophils Relative 1 %      Neutrophils Absolute 2 43 Thousands/µL      Immature Grans Absolute 0 03 Thousand/uL      Lymphocytes Absolute 2 46 Thousands/µL      Monocytes Absolute 0 78 Thousand/µL      Eosinophils Absolute 0 18 Thousand/µL      Basophils Absolute 0 08 Thousands/µL                  XR chest 2 views    (Results Pending)              Procedures  ECG 12 Lead Documentation Only    Date/Time: 2/16/2021 11:11 PM  Performed by: Jeanmarie Fox MD  Authorized by: Jeanmarie Fox MD     Indications / Diagnosis:  Sob  ECG reviewed by me, the ED Provider: yes    Patient location:  ED  Previous ECG:     Comparison to cardiac monitor: Yes    Interpretation:     Interpretation: abnormal    Rate:     ECG rate:  69bpm    ECG rate assessment: normal    Rhythm:     Rhythm: sinus rhythm and paced    Ectopy:     Ectopy: none    QRS:     QRS axis:  Right  Conduction:     Conduction: abnormal      Abnormal conduction: 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    ST segments:     ST segments:  Abnormal    Depression:  III and aVF  T waves:     T waves: inverted      Inverted:  AVR, V1 and V3  Comments:      No STEMI  SC 272ms  QRS 156ms  QT 465ms    pacemaker             ED Course  ED Course as of Feb 16 0428   Tue Feb 16, 2021   0045 Chest x-ray read and interpreted by me  Negative for pneumothorax, mediastinal widening, rib fracture, focal consolidation or pleural effusion  2937 Patient was ambulated in the emergency department on room air with a pulse ox of 98%  No tachypneia, tachycardia or shortness of breath  0120 Reassessed patient  Reviewed labs and imaging  Plan for discharge with primary care provider follow-up for elevated thyroid-stimulating hormone and Cardiology follow-up for dyspnea on exertion  Patient's wife contacted to pick him up from the emergency department  Strict return to ER precautions discussed with and acknowledged by patient                MDM  Number of Diagnoses or Management Options  Abnormal serum thyroid stimulating hormone (TSH) level:   Dyspnea on exertion:   SOB (shortness of breath):      Amount and/or Complexity of Data Reviewed  Clinical lab tests: reviewed and ordered  Tests in the radiology section of CPT®: reviewed and ordered  Tests in the medicine section of CPT®: ordered and reviewed  Review and summarize past medical records: yes  Independent visualization of images, tracings, or specimens: yes (EKG, CXR)    Risk of Complications, Morbidity, and/or Mortality  Presenting problems: moderate  Diagnostic procedures: moderate  Management options: moderate    Patient Progress  Patient progress: stable      Disposition  Final diagnoses:   SOB (shortness of breath)   Abnormal serum thyroid stimulating hormone (TSH) level   Dyspnea on exertion     Time reflects when diagnosis was documented in both MDM as applicable and the Disposition within this note     Time User Action Codes Description Comment    2/16/2021 12:45 AM Astatula Plume Add [R06 02] SOB (shortness of breath)     2/16/2021 12:45 AM Martha Plume Add [R79 89] Abnormal serum thyroid stimulating hormone (TSH) level     2/16/2021  1:21 AM Martha Plume Add [R06 00] Dyspnea on exertion       ED Disposition     ED Disposition Condition Date/Time Comment    Discharge Stable Tue Feb 16, 2021  1:21 AM Aidan Vu Stump discharge to home/self care  Follow-up Information     Follow up With Specialties Details Why Maicol Salomon MD Family Medicine Call in 1 day Please follow-up with your primary care provider regarding your elevated thyroid stimulating hormone level as you may require outpatient thyroid workup  [address],  Cardiology Call in 1 day Please follow-up with your cardiologist regarding your dyspnea on exertion   [address]  [phone]            Discharge Medication List as of 2/16/2021  1:22 AM      CONTINUE these medications which have NOT CHANGED    Details   gabapentin (NEURONTIN) 100 mg capsule Take 1 capsule (100 mg total) by mouth 3 (three) times a day, Starting Wed 11/25/2020, Normal      pantoprazole (Protonix) 40 mg tablet Take by mouth, Starting Fri 2/22/2019, Historical Med      simvastatin (ZOCOR) 40 mg tablet Starting Wed 1/15/2020, Historical Med      acetaminophen (TYLENOL) 325 mg tablet Take 2 tablets (650 mg total) by mouth 4 (four) times a day, Starting Wed 11/25/2020, No Print      Eliquis 5 MG Starting Mon 12/28/2020, Historical Med           No discharge procedures on file      PDMP Review     None          ED Provider  Electronically Signed by    Cheri Lesch, MD Asa Hanly, MD  02/16/21 9328

## 2021-02-16 NOTE — ED NOTES
IV catheter discontinued intact  Site without signs and symptoms of complications  Dressing and pressure applied  Patient verbalized understanding of discharge instructions including medication administration and follow-up care  No further questions or concerns at this time          Paulino Davis RN  02/16/21 1942

## 2021-02-19 ENCOUNTER — IMMUNIZATIONS (OUTPATIENT)
Dept: FAMILY MEDICINE CLINIC | Facility: HOSPITAL | Age: 81
End: 2021-02-19

## 2021-02-19 DIAGNOSIS — Z23 ENCOUNTER FOR IMMUNIZATION: Primary | ICD-10-CM

## 2021-02-19 PROCEDURE — 0012A SARS-COV-2 / COVID-19 MRNA VACCINE (MODERNA) 100 MCG: CPT

## 2021-02-19 PROCEDURE — 91301 SARS-COV-2 / COVID-19 MRNA VACCINE (MODERNA) 100 MCG: CPT

## 2021-02-21 LAB
ATRIAL RATE: 69 BPM
P AXIS: 44 DEGREES
PR INTERVAL: 272 MS
QRS AXIS: 103 DEGREES
QRSD INTERVAL: 156 MS
QT INTERVAL: 434 MS
QTC INTERVAL: 465 MS
T WAVE AXIS: 38 DEGREES
VENTRICULAR RATE: 69 BPM

## 2021-02-21 PROCEDURE — 93010 ELECTROCARDIOGRAM REPORT: CPT | Performed by: INTERNAL MEDICINE

## 2021-03-08 ENCOUNTER — APPOINTMENT (OUTPATIENT)
Dept: LAB | Facility: HOSPITAL | Age: 81
End: 2021-03-08
Payer: MEDICARE

## 2021-03-08 ENCOUNTER — TRANSCRIBE ORDERS (OUTPATIENT)
Dept: ADMINISTRATIVE | Facility: HOSPITAL | Age: 81
End: 2021-03-08

## 2021-03-08 DIAGNOSIS — E03.9 MYXEDEMA HEART DISEASE: Primary | ICD-10-CM

## 2021-03-08 DIAGNOSIS — I51.9 MYXEDEMA HEART DISEASE: Primary | ICD-10-CM

## 2021-03-08 LAB — TSH SERPL DL<=0.05 MIU/L-ACNC: 4.44 UIU/ML (ref 0.36–3.74)

## 2021-03-08 PROCEDURE — 36415 COLL VENOUS BLD VENIPUNCTURE: CPT

## 2021-03-08 PROCEDURE — 84443 ASSAY THYROID STIM HORMONE: CPT

## 2021-03-11 DIAGNOSIS — I42.0 DILATED CARDIOMYOPATHY (HCC): ICD-10-CM

## 2021-03-11 DIAGNOSIS — I34.0 NONRHEUMATIC MITRAL (VALVE) INSUFFICIENCY: ICD-10-CM

## 2021-03-11 DIAGNOSIS — Z95.0 PRESENCE OF CARDIAC PACEMAKER: ICD-10-CM

## 2021-05-06 ENCOUNTER — TRANSCRIBE ORDERS (OUTPATIENT)
Dept: ADMINISTRATIVE | Facility: HOSPITAL | Age: 81
End: 2021-05-06

## 2021-05-06 ENCOUNTER — APPOINTMENT (OUTPATIENT)
Dept: LAB | Facility: HOSPITAL | Age: 81
End: 2021-05-06
Payer: MEDICARE

## 2021-05-06 DIAGNOSIS — G62.9 NEUROPATHY: ICD-10-CM

## 2021-05-06 DIAGNOSIS — G62.9 NEUROPATHY: Primary | ICD-10-CM

## 2021-05-06 LAB
25(OH)D3 SERPL-MCNC: 25.8 NG/ML (ref 30–100)
FOLATE SERPL-MCNC: 9.3 NG/ML (ref 3.1–17.5)
TSH SERPL DL<=0.05 MIU/L-ACNC: 5.61 UIU/ML (ref 0.36–3.74)
VIT B12 SERPL-MCNC: 215 PG/ML (ref 100–900)

## 2021-05-06 PROCEDURE — 82607 VITAMIN B-12: CPT

## 2021-05-06 PROCEDURE — 84166 PROTEIN E-PHORESIS/URINE/CSF: CPT | Performed by: PATHOLOGY

## 2021-05-06 PROCEDURE — 82306 VITAMIN D 25 HYDROXY: CPT

## 2021-05-06 PROCEDURE — 82746 ASSAY OF FOLIC ACID SERUM: CPT

## 2021-05-06 PROCEDURE — 36415 COLL VENOUS BLD VENIPUNCTURE: CPT

## 2021-05-06 PROCEDURE — 84165 PROTEIN E-PHORESIS SERUM: CPT | Performed by: PATHOLOGY

## 2021-05-06 PROCEDURE — 84165 PROTEIN E-PHORESIS SERUM: CPT

## 2021-05-06 PROCEDURE — 84443 ASSAY THYROID STIM HORMONE: CPT

## 2021-05-06 PROCEDURE — 84166 PROTEIN E-PHORESIS/URINE/CSF: CPT | Performed by: FAMILY MEDICINE

## 2021-05-10 LAB
ALBUMIN SERPL ELPH-MCNC: 4.35 G/DL (ref 3.5–5)
ALBUMIN SERPL ELPH-MCNC: 60.4 % (ref 52–65)
ALBUMIN UR ELPH-MCNC: 35.3 %
ALPHA1 GLOB MFR UR ELPH: 10.4 %
ALPHA1 GLOB SERPL ELPH-MCNC: 0.24 G/DL (ref 0.1–0.4)
ALPHA1 GLOB SERPL ELPH-MCNC: 3.4 % (ref 2.5–5)
ALPHA2 GLOB MFR UR ELPH: 16.2 %
ALPHA2 GLOB SERPL ELPH-MCNC: 0.88 G/DL (ref 0.4–1.2)
ALPHA2 GLOB SERPL ELPH-MCNC: 12.2 % (ref 7–13)
B-GLOBULIN MFR UR ELPH: 13.3 %
BETA GLOB ABNORMAL SERPL ELPH-MCNC: 0.37 G/DL (ref 0.4–0.8)
BETA1 GLOB SERPL ELPH-MCNC: 5.1 % (ref 5–13)
BETA2 GLOB SERPL ELPH-MCNC: 4.2 % (ref 2–8)
BETA2+GAMMA GLOB SERPL ELPH-MCNC: 0.3 G/DL (ref 0.2–0.5)
GAMMA GLOB ABNORMAL SERPL ELPH-MCNC: 1.06 G/DL (ref 0.5–1.6)
GAMMA GLOB MFR UR ELPH: 24.8 %
GAMMA GLOB SERPL ELPH-MCNC: 14.7 % (ref 12–22)
IGG/ALB SER: 1.53 {RATIO} (ref 1.1–1.8)
PROT PATTERN SERPL ELPH-IMP: ABNORMAL
PROT PATTERN UR ELPH-IMP: ABNORMAL
PROT SERPL-MCNC: 7.2 G/DL (ref 6.4–8.2)
PROT UR-MCNC: 38 MG/DL

## 2021-08-06 ENCOUNTER — HOSPITAL ENCOUNTER (OUTPATIENT)
Dept: NON INVASIVE DIAGNOSTICS | Facility: HOSPITAL | Age: 81
Discharge: HOME | End: 2021-08-06
Payer: MEDICARE

## 2021-08-06 DIAGNOSIS — I42.0 DILATED CARDIOMYOPATHY (HCC): ICD-10-CM

## 2021-08-06 DIAGNOSIS — I49.5 SICK SINUS SYNDROME (HCC): ICD-10-CM

## 2021-08-06 DIAGNOSIS — R93.1 ABNORMAL FINDINGS ON DIAGNOSTIC IMAGING OF HEART AND CORONARY CIRCULATION: ICD-10-CM

## 2021-08-06 DIAGNOSIS — Z95.0 PRESENCE OF CARDIAC PACEMAKER: ICD-10-CM

## 2021-08-06 DIAGNOSIS — E78.5 HYPERLIPIDEMIA, UNSPECIFIED: ICD-10-CM

## 2021-08-06 DIAGNOSIS — I34.0 NONRHEUMATIC MITRAL (VALVE) INSUFFICIENCY: ICD-10-CM

## 2021-08-06 PROCEDURE — 93306 TTE W/DOPPLER COMPLETE: CPT

## 2021-08-08 PROCEDURE — 93306 TTE W/DOPPLER COMPLETE: CPT | Performed by: INTERNAL MEDICINE

## 2021-08-27 ENCOUNTER — OFFICE VISIT (OUTPATIENT)
Dept: CARDIOLOGY CLINIC | Facility: CLINIC | Age: 81
End: 2021-08-27
Payer: MEDICARE

## 2021-08-27 VITALS
OXYGEN SATURATION: 96 % | WEIGHT: 181 LBS | SYSTOLIC BLOOD PRESSURE: 118 MMHG | DIASTOLIC BLOOD PRESSURE: 78 MMHG | BODY MASS INDEX: 25.91 KG/M2 | TEMPERATURE: 97.1 F | HEIGHT: 70 IN | HEART RATE: 68 BPM

## 2021-08-27 DIAGNOSIS — Z86.79 HISTORY OF TACHYCARDIA-BRADYCARDIA SYNDROME: Primary | ICD-10-CM

## 2021-08-27 DIAGNOSIS — I34.0 MITRAL VALVE INSUFFICIENCY, UNSPECIFIED ETIOLOGY: ICD-10-CM

## 2021-08-27 DIAGNOSIS — R00.1 BRADYCARDIA: ICD-10-CM

## 2021-08-27 DIAGNOSIS — Z95.0 PRESENCE OF CARDIAC PACEMAKER: ICD-10-CM

## 2021-08-27 DIAGNOSIS — I42.0 DILATED CARDIOMYOPATHY (HCC): ICD-10-CM

## 2021-08-27 DIAGNOSIS — I77.810 DILATED AORTIC ROOT (HCC): ICD-10-CM

## 2021-08-27 DIAGNOSIS — I49.1 PREMATURE ATRIAL CONTRACTIONS: ICD-10-CM

## 2021-08-27 DIAGNOSIS — E78.2 MIXED HYPERLIPIDEMIA: ICD-10-CM

## 2021-08-27 PROCEDURE — 99204 OFFICE O/P NEW MOD 45 MIN: CPT | Performed by: INTERNAL MEDICINE

## 2021-08-27 RX ORDER — LEVOTHYROXINE SODIUM 0.03 MG/1
TABLET ORAL
COMMUNITY
Start: 2021-08-11

## 2021-08-27 RX ORDER — CARISOPRODOL 350 MG/1
TABLET ORAL
COMMUNITY
Start: 2020-11-12 | End: 2021-09-03 | Stop reason: CLARIF

## 2021-08-27 RX ORDER — ASPIRIN 81 MG/1
81 TABLET ORAL
Status: ON HOLD | COMMUNITY
End: 2021-10-21

## 2021-08-27 NOTE — PATIENT INSTRUCTIONS
Continue current medications without change  Echocardiogram 7/2021 showed mild to moderate mitral regurgitation  Call me with questions or concerns  Will transfer device management to [address]

## 2021-09-02 ENCOUNTER — HOSPITAL ENCOUNTER (OUTPATIENT)
Facility: HOSPITAL | Age: 81
Setting detail: OBSERVATION
Discharge: HOME | End: 2021-09-03
Attending: EMERGENCY MEDICINE | Admitting: INTERNAL MEDICINE
Payer: MEDICARE

## 2021-09-02 ENCOUNTER — APPOINTMENT (EMERGENCY)
Dept: RADIOLOGY | Facility: HOSPITAL | Age: 81
End: 2021-09-02
Payer: MEDICARE

## 2021-09-02 DIAGNOSIS — R07.9 CHEST PAIN, UNSPECIFIED TYPE: Primary | ICD-10-CM

## 2021-09-02 LAB
ALBUMIN SERPL BCP-MCNC: 3.8 G/DL (ref 3.5–5)
ALP SERPL-CCNC: 64 U/L (ref 46–116)
ALT SERPL W P-5'-P-CCNC: 26 U/L (ref 12–78)
ANION GAP SERPL CALCULATED.3IONS-SCNC: 6 MMOL/L (ref 4–13)
AST SERPL W P-5'-P-CCNC: 21 U/L (ref 5–45)
BASOPHILS # BLD AUTO: 0.08 THOUSANDS/ΜL (ref 0–0.1)
BASOPHILS NFR BLD AUTO: 1 % (ref 0–1)
BILIRUB SERPL-MCNC: 0.36 MG/DL (ref 0.2–1)
BUN SERPL-MCNC: 23 MG/DL (ref 5–25)
CALCIUM SERPL-MCNC: 8.6 MG/DL (ref 8.3–10.1)
CHLORIDE SERPL-SCNC: 104 MMOL/L (ref 100–108)
CO2 SERPL-SCNC: 28 MMOL/L (ref 21–32)
CREAT SERPL-MCNC: 1.44 MG/DL (ref 0.6–1.3)
D DIMER PPP FEU-MCNC: 0.35 UG/ML FEU
EOSINOPHIL # BLD AUTO: 0.21 THOUSAND/ΜL (ref 0–0.61)
EOSINOPHIL NFR BLD AUTO: 3 % (ref 0–6)
ERYTHROCYTE [DISTWIDTH] IN BLOOD BY AUTOMATED COUNT: 12.9 % (ref 11.6–15.1)
GFR SERPL CREATININE-BSD FRML MDRD: 45 ML/MIN/1.73SQ M
GLUCOSE SERPL-MCNC: 80 MG/DL (ref 65–140)
HCT VFR BLD AUTO: 43.9 % (ref 36.5–49.3)
HGB BLD-MCNC: 14.7 G/DL (ref 12–17)
IMM GRANULOCYTES # BLD AUTO: 0.03 THOUSAND/UL (ref 0–0.2)
IMM GRANULOCYTES NFR BLD AUTO: 0 % (ref 0–2)
LACTATE SERPL-SCNC: 1.1 MMOL/L (ref 0.5–2)
LYMPHOCYTES # BLD AUTO: 2.89 THOUSANDS/ΜL (ref 0.6–4.47)
LYMPHOCYTES NFR BLD AUTO: 39 % (ref 14–44)
MCH RBC QN AUTO: 30.9 PG (ref 26.8–34.3)
MCHC RBC AUTO-ENTMCNC: 33.5 G/DL (ref 31.4–37.4)
MCV RBC AUTO: 92 FL (ref 82–98)
MONOCYTES # BLD AUTO: 0.85 THOUSAND/ΜL (ref 0.17–1.22)
MONOCYTES NFR BLD AUTO: 11 % (ref 4–12)
NEUTROPHILS # BLD AUTO: 3.41 THOUSANDS/ΜL (ref 1.85–7.62)
NEUTS SEG NFR BLD AUTO: 46 % (ref 43–75)
NRBC BLD AUTO-RTO: 0 /100 WBCS
NT-PROBNP SERPL-MCNC: 223 PG/ML
PLATELET # BLD AUTO: 195 THOUSANDS/UL (ref 149–390)
PMV BLD AUTO: 10.2 FL (ref 8.9–12.7)
POTASSIUM SERPL-SCNC: 4.2 MMOL/L (ref 3.5–5.3)
PROT SERPL-MCNC: 7.3 G/DL (ref 6.4–8.2)
RBC # BLD AUTO: 4.75 MILLION/UL (ref 3.88–5.62)
SODIUM SERPL-SCNC: 138 MMOL/L (ref 136–145)
TROPONIN I SERPL-MCNC: <0.02 NG/ML
WBC # BLD AUTO: 7.47 THOUSAND/UL (ref 4.31–10.16)

## 2021-09-02 PROCEDURE — 83880 ASSAY OF NATRIURETIC PEPTIDE: CPT | Performed by: EMERGENCY MEDICINE

## 2021-09-02 PROCEDURE — 36415 COLL VENOUS BLD VENIPUNCTURE: CPT | Performed by: EMERGENCY MEDICINE

## 2021-09-02 PROCEDURE — 93005 ELECTROCARDIOGRAM TRACING: CPT

## 2021-09-02 PROCEDURE — 71045 X-RAY EXAM CHEST 1 VIEW: CPT

## 2021-09-02 PROCEDURE — 85379 FIBRIN DEGRADATION QUANT: CPT | Performed by: EMERGENCY MEDICINE

## 2021-09-02 PROCEDURE — 83605 ASSAY OF LACTIC ACID: CPT | Performed by: EMERGENCY MEDICINE

## 2021-09-02 PROCEDURE — 84484 ASSAY OF TROPONIN QUANT: CPT | Performed by: EMERGENCY MEDICINE

## 2021-09-02 PROCEDURE — 84443 ASSAY THYROID STIM HORMONE: CPT | Performed by: NURSE PRACTITIONER

## 2021-09-02 PROCEDURE — 84439 ASSAY OF FREE THYROXINE: CPT | Performed by: NURSE PRACTITIONER

## 2021-09-02 PROCEDURE — 99285 EMERGENCY DEPT VISIT HI MDM: CPT | Performed by: EMERGENCY MEDICINE

## 2021-09-02 PROCEDURE — 85025 COMPLETE CBC W/AUTO DIFF WBC: CPT | Performed by: EMERGENCY MEDICINE

## 2021-09-02 PROCEDURE — 99285 EMERGENCY DEPT VISIT HI MDM: CPT

## 2021-09-02 PROCEDURE — 80053 COMPREHEN METABOLIC PANEL: CPT | Performed by: EMERGENCY MEDICINE

## 2021-09-02 RX ORDER — ASPIRIN 81 MG/1
324 TABLET, CHEWABLE ORAL ONCE
Status: COMPLETED | OUTPATIENT
Start: 2021-09-02 | End: 2021-09-02

## 2021-09-02 RX ADMIN — ASPIRIN 324 MG: 81 TABLET, CHEWABLE ORAL at 23:05

## 2021-09-03 ENCOUNTER — APPOINTMENT (OUTPATIENT)
Dept: NON INVASIVE DIAGNOSTICS | Facility: HOSPITAL | Age: 81
End: 2021-09-03
Payer: MEDICARE

## 2021-09-03 ENCOUNTER — APPOINTMENT (OUTPATIENT)
Dept: NUCLEAR MEDICINE | Facility: HOSPITAL | Age: 81
End: 2021-09-03
Payer: MEDICARE

## 2021-09-03 ENCOUNTER — TELEPHONE (OUTPATIENT)
Dept: CARDIOLOGY CLINIC | Facility: CLINIC | Age: 81
End: 2021-09-03

## 2021-09-03 VITALS
TEMPERATURE: 97.8 F | RESPIRATION RATE: 21 BRPM | HEART RATE: 60 BPM | WEIGHT: 187.61 LBS | DIASTOLIC BLOOD PRESSURE: 82 MMHG | SYSTOLIC BLOOD PRESSURE: 148 MMHG | HEIGHT: 70 IN | BODY MASS INDEX: 26.86 KG/M2 | OXYGEN SATURATION: 97 %

## 2021-09-03 PROBLEM — E03.8 OTHER SPECIFIED HYPOTHYROIDISM: Status: ACTIVE | Noted: 2021-09-03

## 2021-09-03 PROBLEM — Z86.718 HISTORY OF VENOUS THROMBOEMBOLISM: Status: ACTIVE | Noted: 2021-09-03

## 2021-09-03 PROBLEM — R79.89 ELEVATED SERUM CREATININE: Status: ACTIVE | Noted: 2021-09-03

## 2021-09-03 LAB
ATRIAL RATE: 468 BPM
ATRIAL RATE: 60 BPM
ATRIAL RATE: 63 BPM
ATRIAL RATE: 77 BPM
BACTERIA UR QL AUTO: ABNORMAL /HPF
BILIRUB UR QL STRIP: NEGATIVE
CLARITY UR: CLEAR
COLOR UR: YELLOW
GLUCOSE UR STRIP-MCNC: NEGATIVE MG/DL
HGB UR QL STRIP.AUTO: NEGATIVE
KETONES UR STRIP-MCNC: NEGATIVE MG/DL
LEUKOCYTE ESTERASE UR QL STRIP: NEGATIVE
NITRITE UR QL STRIP: NEGATIVE
NON-SQ EPI CELLS URNS QL MICRO: ABNORMAL /HPF
P AXIS: 55 DEGREES
PH UR STRIP.AUTO: 5.5 [PH]
PR INTERVAL: 288 MS
PROT UR STRIP-MCNC: NEGATIVE MG/DL
QRS AXIS: 105 DEGREES
QRS AXIS: 108 DEGREES
QRS AXIS: 108 DEGREES
QRS AXIS: 109 DEGREES
QRSD INTERVAL: 154 MS
QRSD INTERVAL: 156 MS
QRSD INTERVAL: 160 MS
QRSD INTERVAL: 162 MS
QT INTERVAL: 416 MS
QT INTERVAL: 426 MS
QT INTERVAL: 436 MS
QT INTERVAL: 450 MS
QTC INTERVAL: 438 MS
QTC INTERVAL: 450 MS
QTC INTERVAL: 452 MS
QTC INTERVAL: 460 MS
RBC #/AREA URNS AUTO: ABNORMAL /HPF
SP GR UR STRIP.AUTO: 1.01 (ref 1–1.03)
T WAVE AXIS: 0 DEGREES
T WAVE AXIS: 12 DEGREES
T WAVE AXIS: 43 DEGREES
T WAVE AXIS: 7 DEGREES
T4 FREE SERPL-MCNC: 0.76 NG/DL (ref 0.76–1.46)
TROPONIN I SERPL-MCNC: <0.02 NG/ML
TSH SERPL DL<=0.05 MIU/L-ACNC: 6.36 UIU/ML (ref 0.36–3.74)
UROBILINOGEN UR QL STRIP.AUTO: 0.2 E.U./DL
VENTRICULAR RATE: 61 BPM
VENTRICULAR RATE: 63 BPM
VENTRICULAR RATE: 67 BPM
VENTRICULAR RATE: 71 BPM
WBC #/AREA URNS AUTO: ABNORMAL /HPF

## 2021-09-03 PROCEDURE — G1004 CDSM NDSC: HCPCS

## 2021-09-03 PROCEDURE — 84484 ASSAY OF TROPONIN QUANT: CPT | Performed by: EMERGENCY MEDICINE

## 2021-09-03 PROCEDURE — 93005 ELECTROCARDIOGRAM TRACING: CPT

## 2021-09-03 PROCEDURE — A9502 TC99M TETROFOSMIN: HCPCS

## 2021-09-03 PROCEDURE — 93017 CV STRESS TEST TRACING ONLY: CPT

## 2021-09-03 PROCEDURE — 84484 ASSAY OF TROPONIN QUANT: CPT | Performed by: NURSE PRACTITIONER

## 2021-09-03 PROCEDURE — 36415 COLL VENOUS BLD VENIPUNCTURE: CPT | Performed by: NURSE PRACTITIONER

## 2021-09-03 PROCEDURE — 78452 HT MUSCLE IMAGE SPECT MULT: CPT

## 2021-09-03 PROCEDURE — 81001 URINALYSIS AUTO W/SCOPE: CPT | Performed by: NURSE PRACTITIONER

## 2021-09-03 PROCEDURE — 99236 HOSP IP/OBS SAME DATE HI 85: CPT | Performed by: INTERNAL MEDICINE

## 2021-09-03 RX ORDER — MELATONIN
2000 DAILY
Status: DISCONTINUED | OUTPATIENT
Start: 2021-09-03 | End: 2021-09-03 | Stop reason: HOSPADM

## 2021-09-03 RX ORDER — ASPIRIN 81 MG/1
81 TABLET, CHEWABLE ORAL DAILY
Status: DISCONTINUED | OUTPATIENT
Start: 2021-09-03 | End: 2021-09-03 | Stop reason: HOSPADM

## 2021-09-03 RX ORDER — LEVOTHYROXINE SODIUM 0.03 MG/1
25 TABLET ORAL
Status: DISCONTINUED | OUTPATIENT
Start: 2021-09-03 | End: 2021-09-03 | Stop reason: HOSPADM

## 2021-09-03 RX ORDER — PRAVASTATIN SODIUM 80 MG/1
80 TABLET ORAL
Status: DISCONTINUED | OUTPATIENT
Start: 2021-09-03 | End: 2021-09-03 | Stop reason: HOSPADM

## 2021-09-03 RX ORDER — PANTOPRAZOLE SODIUM 40 MG/1
40 TABLET, DELAYED RELEASE ORAL
Status: DISCONTINUED | OUTPATIENT
Start: 2021-09-03 | End: 2021-09-03 | Stop reason: HOSPADM

## 2021-09-03 RX ADMIN — ASPIRIN 81 MG: 81 TABLET, CHEWABLE ORAL at 08:11

## 2021-09-03 RX ADMIN — LEVOTHYROXINE SODIUM 25 MCG: 25 TABLET ORAL at 07:26

## 2021-09-03 RX ADMIN — Medication 2000 UNITS: at 08:11

## 2021-09-03 RX ADMIN — APIXABAN 5 MG: 5 TABLET, FILM COATED ORAL at 08:12

## 2021-09-03 RX ADMIN — PANTOPRAZOLE SODIUM 40 MG: 40 TABLET, DELAYED RELEASE ORAL at 06:38

## 2021-09-03 NOTE — ASSESSMENT & PLAN NOTE
· Developed chest pain with dyspnea while watching TV, presented to ER with elevated blood pressure reading x1 that resolved spontaneously  · In the past year had multiple rib fractures with septic hemo thorax and has recently resumed his long distance by cycling and is currently biking 50 miles per week  · Reports some chest pain and dyspnea with his most recent bike ride  · Initial troponin and EKG nonischemic  · Follows with [facility] as outpatient-Echocardiogram 7/2021 showed mild to moderate mitral regurgitation     · Plan to trend troponins, EKG  · Nuclear stress test later today  · Continue aspirin and high-intensity statin

## 2021-09-03 NOTE — TELEPHONE ENCOUNTER
You can provide her with the patient advocate phone number, they can help facilitate communication regarding her 's condition with the hospitalist

## 2021-09-03 NOTE — DISCHARGE SUMMARY
[address]  Discharge- Verneice [place] Stump 1940, 78 y o  male MRN: [id number]  Unit/Bed#: ED 01 Encounter: [id number]  Primary Care Provider: Marta Steel MD   Date and time admitted to hospital: 9/2/2021 10:54 PM    Admitting Provider:  Caroline crowley DO  Discharge Provider:  Caroline crowley DO  Admission Date: 9/2/2021       Discharge Date: 09/03/21   LOS: 0  Primary Care Physician at Discharge: Marta Steel MD [address]:  Shena Ernandez is a 78 y o  male with a history of tachy/cinda syndrome, hypothyroidism, and DVT who presented to the hospital with chest pain  The patient is very active and still bicycles 10 miles daily  He was watching TV when he developed chest pain but what worried him was diaphoresis  In the emergency department his cardiac enzymes remain negative  He was seen by cardiology and did undergo nuclear stress test which was negative for any reversible ischemia  He is advised to follow-up with his cardiologist Dr Gena Bermudez as previously scheduled  Please see problem list listed below  DISCHARGE DIAGNOSES  · Chest pain  Atypical   No reversible ischemia noted on stress test   Should continue aspirin 81 mg daily  · History of DVT  Continue Eliquis  · Hypothyroidism continue levothyroxine  · Status post pacer  · Hyperlipidemia  Continue simvastatin  CONSULTING PROVIDERS   IP CONSULT TO CARDIOLOGY    PROCEDURES PERFORMED  NM Myocardial Perfusion Spect (Exercise Induced Stress and/or Rest)  Result Date: 9/3/2021   IMPRESSIONS: No evidence of ischemia/myocardium at risk  Inferior wall perfusion abnormality noted which may represent attenuation artifact given reasonable wall thickening in this segment  Low risk perfusion findings  Mildly reduced LV function suggested on gated analysis  PREVIOUS STUDY COMPARISON: No prior nuclear study available for comparison  Prepared and signed by Amy Hernandez DO Signed 09/03/2021 14:29:20 RADIOLOGY RESULTS  XR chest 1 view portable  Result Date: 9/3/2021  Impression: No acute cardiopulmonary disease  Trace left pleural effusion similar to prior study  Workstation performed: [id number]       LABS  Results from last 7 days   Lab Units 09/02/21  2302   WBC Thousand/uL 7 47   HEMOGLOBIN g/dL 14 7   HEMATOCRIT % 43 9   MCV fL 92   PLATELETS Thousands/uL 195     Results from last 7 days   Lab Units 09/02/21  2302   SODIUM mmol/L 138   POTASSIUM mmol/L 4 2   CHLORIDE mmol/L 104   CO2 mmol/L 28   BUN mg/dL 23   CREATININE mg/dL 1 44*   CALCIUM mg/dL 8 6   ALBUMIN g/dL 3 8   TOTAL BILIRUBIN mg/dL 0 36   ALK PHOS U/L 64   ALT U/L 26   AST U/L 21   EGFR ml/min/1 73sq m 45   GLUCOSE RANDOM mg/dL 80     Results from last 7 days   Lab Units 09/03/21  0914 09/03/21  0635 09/03/21  0326   TROPONIN I ng/mL <0 02 <0 02 <0 02     Results from last 7 days   Lab Units 09/02/21  2302   NT-PRO BNP pg/mL 223      Results from last 7 days   Lab Units 09/02/21  2302   D-DIMER QUANTITATIVE ug/ml FEU 0 35             Results from last 7 days   Lab Units 09/02/21  2302   TSH 3RD GENERATON uIU/mL 6 357*   FREE T4 ng/dL 0 76     Results from last 7 days   Lab Units 09/02/21  2302   LACTIC ACID mmol/L 1 1           Cultures:   Results from last 7 days   Lab Units 09/03/21  0714   COLOR UA  Yellow   CLARITY UA  Clear   SPEC GRAV UA  1 010   PH UA  5 5   LEUKOCYTES UA  Negative   NITRITE UA  Negative   GLUCOSE UA mg/dl Negative   KETONES UA mg/dl Negative   BILIRUBIN UA  Negative   BLOOD UA  Negative      Results from last 7 days   Lab Units 09/03/21  0714   RBC UA /hpf 0-1*   WBC UA /hpf 0-1*   EPITHELIAL CELLS WET PREP /hpf Occasional   BACTERIA UA /hpf None Seen          DISCHARGE DAY VISIT AND PHYSICAL EXAM:  Subjective:  Patient seen before stress test   No further chest pain      Vitals:   Blood Pressure: 148/82 (09/03/21 1421)  Pulse: 66 (09/03/21 1421)  Temperature: 97 8 °F (36 6 °C) (09/03/21 1144)  Temp Source: Oral (09/03/21 1144)  Respirations: 18 (09/03/21 1421)  Height: 5' 10" (177 8 cm) (09/02/21 2259)  Weight - Scale: 85 1 kg (187 lb 9 8 oz) (09/02/21 2259)  SpO2: 93 % (09/03/21 1421)    Physical Exam  Vitals reviewed  Constitutional:       General: He is not in acute distress  Cardiovascular:      Rate and Rhythm: Regular rhythm  Heart sounds: Normal heart sounds  Pulmonary:      Effort: Pulmonary effort is normal       Breath sounds: No wheezing  Abdominal:      General: Bowel sounds are normal       Palpations: Abdomen is soft  Tenderness: There is no abdominal tenderness  There is no rebound  Musculoskeletal:         General: No swelling or tenderness  Skin:     General: Skin is warm  Neurological:      Mental Status: He is alert and oriented to person, place, and time  Cranial Nerves: No cranial nerve deficit  Planned Re-admission:  No  Discharge Disposition: Home/Self Care    Test Results Pending at Discharge:   Incidental findings:     Medications   · Discharge Medication List: See after visit summary for reconciled discharge medications  Diet restrictions:  Cardiac diet  Activity restrictions: No strenuous activity  Discharge Condition: stable    Outpatient Follow-Up and Discharge Instructions  See after visit summary section titled Discharge Instructions for information provided to patient and family  Code Status: Level 1 - Full Code  Discharge Statement   I spent 30 minutes discharging the patient  This time was spent on the day of discharge  Greater than 50% of total time was spent with the patient and / or family counseling and / or coordination of care  ** Please Note: This note has been constructed using a voice recognition system   **

## 2021-09-03 NOTE — TELEPHONE ENCOUNTER
Pt's wife Rosaura Brown)  MARICARMEN stating that her  is in the hospital since yesterday and no one is letting her know what is going on  She was wondering if we would be able to help her get some answers  [phone]

## 2021-09-03 NOTE — ASSESSMENT & PLAN NOTE
· December 2021 developed DVT after VATS procedure from traumatic hemothorax  · Acute occluding and non-occluding deep vein thrombosis noted in the paired   gastrocnemius and proximal calf posterior tibial veins extending into the   popliteal vein  · Eliquis 5 mg b i d

## 2021-09-03 NOTE — H&P
[address]  H&P- Malaika Montano Stump 1940, 78 y o  male MRN: [id number]  Unit/Bed#: ED 01 Encounter: [id number]  Primary Care Provider: Alok Desir MD   Date and time admitted to hospital: 9/2/2021 10:54 PM    * Chest pain  Assessment & Plan  · Developed chest pain with dyspnea while watching TV, presented to ER with elevated blood pressure reading x1 that resolved spontaneously  · In the past year had multiple rib fractures with septic hemo thorax and has recently resumed his long distance by cycling and is currently biking 50 miles per week  · Reports some chest pain and dyspnea with his most recent bike ride  · Initial troponin and EKG nonischemic  · Follows with [facility] as outpatient-Echocardiogram 7/2021 showed mild to moderate mitral regurgitation  · Plan to trend troponins, EKG  · Nuclear stress test later today  · Continue aspirin and high-intensity statin      Other specified hypothyroidism  Assessment & Plan  · Continue levothyroxine 25 mcg daily  · Check TSH    Elevated serum creatinine  Assessment & Plan  · Creatinine 1 44 on admission, most recent 7 months ago was 1 08  · Reports has resumed long distance bicycling and is now cycling 50 miles per week  · Trend creatinine as outpatient as this is not SARAHI definition  · Renally dose medications  · Check urinalysis    History of venous thromboembolism  Assessment & Plan  · December 2021 developed DVT after VATS procedure from traumatic hemothorax  · Acute occluding and non-occluding deep vein thrombosis noted in the paired   gastrocnemius and proximal calf posterior tibial veins extending into the   popliteal vein    · Eliquis 5 mg b i d     Dilated cardiomyopathy ([facility] [address] )  Assessment & Plan  · CHF with compensated LVEF 50%  · Outpatient cardiology follow-up        History of tachycardia-bradycardia syndrome  Assessment & Plan  · Tachy-cinda syndrome  · Dual-chamber permanent pacemaker    VTE Pharmacologic Prophylaxis:   Moderate Risk (Score 3-4) - Pharmacological DVT Prophylaxis Ordered: apixaban (Eliquis)  Code Status: Level 1 - Full Code   Discussion with family: Patient declined call to   Anticipated Length of Stay: Patient will be admitted on an observation basis with an anticipated length of stay of less than 2 midnights secondary to chest pain   Total Time for Visit, including Counseling / Coordination of Care: 30 minutes Greater than 50% of this total time spent on direct patient counseling and coordination of care  Chief Complaint:  Chest pain    History of Present Illness:  Margoth Davis is a 78 y o  male with a PMH of hemothorax secondary to rib fracture, vats procedure, sick sinus syndrome status post pacemaker, mild-to-moderate MR, CHF, avid bicyclist who prior to his traumatic hemothorax was bicycling 100 miles per week and is now back up to 50 miles per week presents to the emergency department after developing midsternal chest pain with dyspnea while watching TV  He does report some dyspnea and chest pain while bicycling earlier this week  Has not had recent stress testing in follows with Cardiology for sick sinus syndrome and CHF  Developed DVT after VATS procedure and now is maintained on Eliquis b i d     Chest pain-free at time of admission  Review of Systems:  Review of Systems   Constitutional: Negative for chills and fever  HENT: Negative for ear pain and sore throat  Eyes: Negative for pain and visual disturbance  Respiratory: Positive for shortness of breath  Negative for cough  Cardiovascular: Positive for chest pain  Negative for palpitations  Gastrointestinal: Negative for abdominal pain and vomiting  Genitourinary: Negative for dysuria and hematuria  Musculoskeletal: Negative for arthralgias and back pain  Skin: Negative for color change and rash  Neurological: Negative for seizures and syncope     All other systems reviewed and are negative  Past Medical and Surgical History:   Past Medical History:   Diagnosis Date    Bladder tumor     Cough     Dyspnea     Hyperlipidemia     Left knee pain     Pacemaker     Pleural effusion     Rib fractures     Right knee pain        Past Surgical History:   Procedure Laterality Date    BLADDER TUMOR EXCISION      CARDIAC PACEMAKER PLACEMENT      HERNIA REPAIR      X2    THORACOSCOPY VIDEO ASSISTED SURGERY (VATS) Left 11/21/2020    Procedure: THORACOSCOPY VIDEO ASSISTED SURGERY (VATS), washout of hemothorax, decortication;  Surgeon: Miguel Smith MD;  [facility]: [facility];  Service: Thoracic    TONSILLECTOMY         Meds/Allergies:  Prior to Admission medications    Medication Sig Start Date End Date Taking?  Authorizing Provider   acetaminophen (TYLENOL) 325 mg tablet Take 2 tablets (650 mg total) by mouth 4 (four) times a day 11/25/20  Yes Rebecca Edwards PA-C   Cholecalciferol 50 MCG (2000 UT) CAPS Take by mouth 6/4/21  Yes Historical Provider, MD   Coenzyme Q10 (CO Q 10 PO) Take by mouth   Yes Historical Provider, MD   Cyanocobalamin (VITAMIN B 12 PO) Take by mouth   Yes Historical Provider, MD   Eliquis 5 MG  12/28/20  Yes Historical Provider, MD   levothyroxine 25 mcg tablet  8/11/21  Yes Historical Provider, MD   pantoprazole (Protonix) 40 mg tablet Take by mouth 2/22/19  Yes Historical Provider, MD   simvastatin (ZOCOR) 40 mg tablet  1/15/20  Yes Historical Provider, MD   aspirin (ECOTRIN LOW STRENGTH) 81 mg EC tablet Take 81 mg by mouth  Patient not taking: Reported on 8/27/2021    Historical Provider, MD   carisoprodol (SOMA) 350 mg tablet Take by mouth  Patient not taking: Reported on 8/27/2021 11/12/20   Historical Provider, MD   DOCUSATE SODIUM PO Take by mouth  Patient not taking: Reported on 8/27/2021 11/12/20   Historical Provider, MD   gabapentin (NEURONTIN) 100 mg capsule Take 1 capsule (100 mg total) by mouth 3 (three) times a day  Patient not taking: Reported on 8/27/2021 11/25/20   Anibal Carlos PA-C     I have reviewed home medications with patient personally  Allergies: Allergies   Allergen Reactions    Penicillins Anaphylaxis and Hives       Social History:  Marital Status: /Civil Union   Occupation:  Retired  Patient Pre-hospital Living Situation: Home  Patient Pre-hospital Level of Mobility: walks  Patient Pre-hospital Diet Restrictions:  None  Substance Use History:   Social History     Substance and Sexual Activity   Alcohol Use Not Currently     Social History     Tobacco Use   Smoking Status Never Smoker   Smokeless Tobacco Never Used     Social History     Substance and Sexual Activity   Drug Use Not Currently       Family History:  Family History   Problem Relation Age of Onset    Lung cancer Mother     Colon cancer Father     COPD Sister        Physical Exam:     Vitals:   Blood Pressure: 141/72 (09/03/21 0600)  Pulse: 60 (09/03/21 0600)  Temperature: 97 8 °F (36 6 °C) (09/02/21 2259)  Temp Source: Temporal (09/02/21 2259)  Respirations: 16 (09/03/21 0600)  Height: 5' 10" (177 8 cm) (09/02/21 2259)  Weight - Scale: 85 1 kg (187 lb 9 8 oz) (09/02/21 2259)  SpO2: 94 % (09/03/21 0600)    Physical Exam  Vitals and nursing note reviewed  Constitutional:       Appearance: He is well-developed  HENT:      Head: Normocephalic and atraumatic  Eyes:      Conjunctiva/sclera: Conjunctivae normal    Cardiovascular:      Rate and Rhythm: Normal rate and regular rhythm  Heart sounds: No murmur heard  Pulmonary:      Effort: Pulmonary effort is normal  No respiratory distress  Breath sounds: Normal breath sounds  Abdominal:      Palpations: Abdomen is soft  Tenderness: There is no abdominal tenderness  Musculoskeletal:         General: No swelling or tenderness  Normal range of motion  Cervical back: Neck supple  Skin:     General: Skin is warm and dry  Capillary Refill: Capillary refill takes less than 2 seconds  Neurological:      General: No focal deficit present  Mental Status: He is alert and oriented to person, place, and time  Cranial Nerves: No cranial nerve deficit  Psychiatric:         Mood and Affect: Mood normal          Behavior: Behavior normal        Additional Data:     Lab Results:  Results from last 7 days   Lab Units 09/02/21  2302   WBC Thousand/uL 7 47   HEMOGLOBIN g/dL 14 7   HEMATOCRIT % 43 9   PLATELETS Thousands/uL 195   NEUTROS PCT % 46   LYMPHS PCT % 39   MONOS PCT % 11   EOS PCT % 3     Results from last 7 days   Lab Units 09/02/21  2302   SODIUM mmol/L 138   POTASSIUM mmol/L 4 2   CHLORIDE mmol/L 104   CO2 mmol/L 28   BUN mg/dL 23   CREATININE mg/dL 1 44*   ANION GAP mmol/L 6   CALCIUM mg/dL 8 6   ALBUMIN g/dL 3 8   TOTAL BILIRUBIN mg/dL 0 36   ALK PHOS U/L 64   ALT U/L 26   AST U/L 21   GLUCOSE RANDOM mg/dL 80                 Results from last 7 days   Lab Units 09/02/21  2302   LACTIC ACID mmol/L 1 1       Imaging: Reviewed radiology reports from this admission including: chest xray  XR chest 1 view portable   ED Interpretation by Christopher Duffy MD (09/02 2337)   NAD          EKG and Other Studies Reviewed on Admission:   · EKG: Paced rhythm  HR 70     ** Please Note: This note has been constructed using a voice recognition system   **

## 2021-09-03 NOTE — CONSULTS
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 hypotension  Non occlusive CAD by [facility] in 2009  Mild to moderate MR  DVT Dec 2020 on eliquis since  Sp Hemothorax sp VATS chest tube Nov 2020    Plan:  1  Agree w nuc stress today  2  No further change in regimen  3  Monitor on telemetry, trend troponin and EKG  4  If stress test negative plan for discharge with OP follow up with primary cardiologist- Dr Dagoberto Wagner    History of Present Illness   Physician Requesting Consult: Augustine Espinoza DO  Reason for Consult / Principal Problem: chest pain  HPI: Johny Chan is a 78y o  year old male with history of DVT on eliquis presented to the ED for concerns with chest pain that started two days ago  He reports the first time this occurred was when he was biking but it was a very mild discomfort and went away during his exercise  He reports his chest pain goes across his chest and down the left arm  He reports it recurred that day and yesterday but was random, non exertional  It lasted for around 10 minutes and resolved  He then noted diaphoresis and ever since has been feeling more SOB  He had no associated nausea or vomiting  He has no history of CAD  He has a negative troponin x 3  His EKG is non ischemic  He reports he has had chest pain in the ED but it is mild and coming and going on its own  Pt follows as an OP with Dr Dagoberto Wagner  He has a history of SSS with prior pacemaker implantation on the R side  He has a history of MR which was thought to be severe  He had a HUSSEIN last year that showed only mild to moderate MR with an EF of 40-45%  He has had a cath back in 2009 that showed non occlusive disease  Pt also has a history of a traumatic rib fracture in November of 2020   He was noted to have a hemothorax and was sp VATS chest tube placement with thoracic surgery  He did well post operatively but then in December developed unilateral leg swelling and was found to have  DVT  He was started on eliquis and at most recent visit with thoracic surgery decided to complete at least two more months of anticoagulation    Inpatient consult to Cardiology  Consult performed by: Darien Cordon PA-C  Consult ordered by: IVY Centeno          Review of Systems   Constitutional: Positive for diaphoresis  Negative for chills, fatigue and unexpected weight change  Respiratory: Positive for chest tightness and shortness of breath  Negative for wheezing  Cardiovascular: Positive for chest pain  Negative for palpitations and leg swelling  Gastrointestinal: Negative for nausea  Genitourinary: Negative for difficulty urinating  Skin: Negative for color change, pallor and rash  Neurological: Negative for dizziness, syncope and light-headedness  Psychiatric/Behavioral: Negative for agitation, behavioral problems and confusion         Historical Information   Past Medical History:   Diagnosis Date    Bladder tumor     Cough     Dyspnea     Hyperlipidemia     Left knee pain     Pacemaker     Pleural effusion     Rib fractures     Right knee pain      Past Surgical History:   Procedure Laterality Date    BLADDER TUMOR EXCISION      CARDIAC PACEMAKER PLACEMENT      HERNIA REPAIR      X2    THORACOSCOPY VIDEO ASSISTED SURGERY (VATS) Left 11/21/2020    Procedure: THORACOSCOPY VIDEO ASSISTED SURGERY (VATS), washout of hemothorax, decortication;  Surgeon: Maryjane Rodrigues MD;  [facility];  Service: Thoracic    TONSILLECTOMY       Social History     Substance and Sexual Activity   Alcohol Use Not Currently     Social History     Substance and Sexual Activity   Drug Use Not Currently     E-Cigarette/Vaping    E-Cigarette Use Never User      E-Cigarette/Vaping Substances    Nicotine No  THC No     CBD No     Flavoring No      Social History     Tobacco Use   Smoking Status Never Smoker   Smokeless Tobacco Never Used     Family History: non-contributory    Meds/Allergies   all current active meds have been reviewed  Allergies   Allergen Reactions    Penicillins Anaphylaxis and Hives       Objective   Vitals: Blood pressure 148/72, pulse 59, temperature 98 °F (36 7 °C), resp  rate 15, height 5' 10" (1 778 m), weight 85 1 kg (187 lb 9 8 oz), SpO2 97 %  Orthostatic Blood Pressures      Most Recent Value   Blood Pressure  148/72 filed at 09/03/2021 5779   Patient Position - Orthostatic VS  Lying filed at 09/03/2021 0810            Intake/Output Summary (Last 24 hours) at 9/3/2021 0843  Last data filed at 9/3/2021 0328  Gross per 24 hour   Intake --   Output 400 ml   Net -400 ml       Invasive Devices     Peripheral Intravenous Line            Peripheral IV 09/02/21 Right Antecubital <1 day                Physical Exam  Vitals and nursing note reviewed  Constitutional:       Appearance: Normal appearance  HENT:      Head: Normocephalic and atraumatic  Cardiovascular:      Rate and Rhythm: Normal rate  Heart sounds: No murmur heard  No gallop  Pulmonary:      Effort: Pulmonary effort is normal       Breath sounds: Normal breath sounds  No wheezing  Abdominal:      Palpations: Abdomen is soft  Musculoskeletal:         General: No swelling  Cervical back: Neck supple  Skin:     General: Skin is warm and dry  Capillary Refill: Capillary refill takes less than 2 seconds  Neurological:      General: No focal deficit present  Mental Status: He is alert and oriented to person, place, and time  Psychiatric:         Mood and Affect: Mood normal          Thought Content: Thought content normal          Lab Results:   I have personally reviewed pertinent lab results      CBC with diff:   Results from last 7 days   Lab Units 09/02/21  2302   WBC Thousand/uL 7 47   RBC Million/uL 4 75   HEMOGLOBIN g/dL 14 7   HEMATOCRIT % 43 9   MCV fL 92   MCH pg 30 9   MCHC g/dL 33 5   RDW % 12 9   MPV fL 10 2   PLATELETS Thousands/uL 195     CMP:   Results from last 7 days   Lab Units 09/02/21  2302   SODIUM mmol/L 138   POTASSIUM mmol/L 4 2   CHLORIDE mmol/L 104   CO2 mmol/L 28   BUN mg/dL 23   CREATININE mg/dL 1 44*   CALCIUM mg/dL 8 6   AST U/L 21   ALT U/L 26   ALK PHOS U/L 64   EGFR ml/min/1 73sq m 45     Troponin:   0   Lab Value Date/Time    TROPONINI <0 02 09/03/2021 0635    TROPONINI <0 02 09/03/2021 0326    TROPONINI <0 02 09/02/2021 2302    TROPONINI <0 02 02/15/2021 2321    TROPONINI <0 02 11/20/2020 2139    TROPONINI <0 02 11/20/2020 1838    TROPONINI <0 02 11/20/2020 1537    TROPONINI 0 02 11/19/2020 0016    TROPONINI <0 02 11/18/2020 1947     BNP:   Results from last 7 days   Lab Units 09/02/21  2302   POTASSIUM mmol/L 4 2   CHLORIDE mmol/L 104   CO2 mmol/L 28   BUN mg/dL 23   CREATININE mg/dL 1 44*   CALCIUM mg/dL 8 6   EGFR ml/min/1 73sq m 45     Coags:     TSH:   Results from last 7 days   Lab Units 09/02/21  2302   TSH 3RD GENERATON uIU/mL 6 357*     Magnesium:        Imaging: I have personally reviewed pertinent reports         EKG: Electronic atrial pacemaker Premature atrial complexes  Right bundle branch block  Abnormal ECG  When compared with ECG of 03-SEP-2021 03:51, (unconfirmed)  No significant change since prior tracing  Confirmed by Bryon Woods ([id number]) on 9/3/2021 8:43:55 AM

## 2021-09-03 NOTE — ED PROVIDER NOTES
History  Chief Complaint   Patient presents with    Chest Pain     Patient reports intermittent CP since yesterday  States pain radiating across chest into left arm  Reports SOB worse than usual, diaphoretic tonight  Patient complaining of intermittent chest pain that goes across his chest and down the left arm since yesterday  Symptoms last 5 minutes to 15 minutes and resolved  Got diaphoretic tonight  Has been having shortness of breath since yesterday  No recent cough or cold symptoms  No fevers or chills  No nausea vomiting diarrhea  No history of MI  No history of CHF  Is on Eliquis secondary to a DVT in his leg  History provided by:  Patient   used: No    Chest Pain  Pain location:  Substernal area  Pain quality: aching    Pain radiates to:  L arm  Pain radiates to the back: no    Pain severity:  Mild  Onset quality:  Gradual  Duration:  2 days  Timing:  Intermittent  Progression:  Resolved  Chronicity:  New  Context: no drug use, no movement and no stress    Relieved by:  Nothing  Worsened by:  Nothing tried  Ineffective treatments:  None tried  Associated symptoms: diaphoresis and shortness of breath    Associated symptoms: no abdominal pain, no altered mental status, no anorexia, no back pain, no cough, no dizziness, no dysphagia, no fever, no headache, no lower extremity edema, no nausea, no palpitations and not vomiting        Prior to Admission Medications   Prescriptions Last Dose Informant Patient Reported? Taking?    Cholecalciferol 50 MCG (2000 UT) CAPS 9/2/2021 at Unknown time  Yes Yes   Sig: Take by mouth   Coenzyme Q10 (CO Q 10 PO) 9/2/2021 at Unknown time  Yes Yes   Sig: Take by mouth   Cyanocobalamin (VITAMIN B 12 PO) 9/2/2021 at Unknown time  Yes Yes   Sig: Take by mouth   DOCUSATE SODIUM PO Not Taking at Unknown time  Yes No   Sig: Take by mouth   Patient not taking: Reported on 8/27/2021   Eliquis 5 MG 9/2/2021 at Unknown time  Yes Yes   acetaminophen (TYLENOL) 325 mg tablet   No Yes   Sig: Take 2 tablets (650 mg total) by mouth 4 (four) times a day   aspirin (ECOTRIN LOW STRENGTH) 81 mg EC tablet Not Taking at Unknown time  Yes No   Sig: Take 81 mg by mouth   Patient not taking: Reported on 8/27/2021   carisoprodol (SOMA) 350 mg tablet Not Taking at Unknown time  Yes No   Sig: Take by mouth   Patient not taking: Reported on 8/27/2021   gabapentin (NEURONTIN) 100 mg capsule Not Taking at Unknown time  No No   Sig: Take 1 capsule (100 mg total) by mouth 3 (three) times a day   Patient not taking: Reported on 8/27/2021   levothyroxine 25 mcg tablet 9/2/2021 at Unknown time  Yes Yes   pantoprazole (Protonix) 40 mg tablet 9/2/2021 at Unknown time  Yes Yes   Sig: Take by mouth   simvastatin (ZOCOR) 40 mg tablet 9/2/2021 at Unknown time  Yes Yes      Facility-Administered Medications: None       Past Medical History:   Diagnosis Date    Bladder tumor     Cough     Dyspnea     Hyperlipidemia     Left knee pain     Pacemaker     Pleural effusion     Rib fractures     Right knee pain        Past Surgical History:   Procedure Laterality Date    BLADDER TUMOR EXCISION      CARDIAC PACEMAKER PLACEMENT      HERNIA REPAIR      X2    THORACOSCOPY VIDEO ASSISTED SURGERY (VATS) Left 11/21/2020    Procedure: THORACOSCOPY VIDEO ASSISTED SURGERY (VATS), washout of hemothorax, decortication;  Surgeon: Michelle Mata MD;  Location: [facility];  Service: Thoracic    TONSILLECTOMY         Family History   Problem Relation Age of Onset    Lung cancer Mother     Colon cancer Father     COPD Sister      I have reviewed and agree with the history as documented      E-Cigarette/Vaping    E-Cigarette Use Never User      E-Cigarette/Vaping Substances    Nicotine No     THC No     CBD No     Flavoring No      Social History     Tobacco Use    Smoking status: Never Smoker    Smokeless tobacco: Never Used   Vaping Use    Vaping Use: Never used   Substance Use Topics  Alcohol use: Not Currently    Drug use: Not Currently       Review of Systems   Constitutional: Positive for diaphoresis  Negative for chills and fever  HENT: Negative for ear pain, hearing loss, sore throat, trouble swallowing and voice change  Eyes: Negative for pain and discharge  Respiratory: Positive for shortness of breath  Negative for cough and wheezing  Cardiovascular: Positive for chest pain  Negative for palpitations  Gastrointestinal: Negative for abdominal pain, anorexia, blood in stool, constipation, diarrhea, nausea and vomiting  Genitourinary: Negative for dysuria, flank pain, frequency and hematuria  Musculoskeletal: Negative for back pain, joint swelling, neck pain and neck stiffness  Skin: Negative for rash and wound  Neurological: Negative for dizziness, seizures, syncope, facial asymmetry and headaches  Psychiatric/Behavioral: Negative for hallucinations, self-injury and suicidal ideas  All other systems reviewed and are negative  Physical Exam  Physical Exam  Vitals and nursing note reviewed  Constitutional:       General: He is not in acute distress  Appearance: He is well-developed  HENT:      Head: Normocephalic and atraumatic  Right Ear: External ear normal       Left Ear: External ear normal    Eyes:      General: No scleral icterus  Right eye: No discharge  Left eye: No discharge  Extraocular Movements: Extraocular movements intact  Conjunctiva/sclera: Conjunctivae normal    Cardiovascular:      Rate and Rhythm: Normal rate and regular rhythm  Heart sounds: Normal heart sounds  No murmur heard  Pulmonary:      Effort: Pulmonary effort is normal       Breath sounds: Normal breath sounds  No wheezing or rales  Abdominal:      General: Bowel sounds are normal  There is no distension  Palpations: Abdomen is soft  Tenderness: There is no abdominal tenderness  There is no guarding or rebound  Musculoskeletal:         General: No deformity  Normal range of motion  Cervical back: Normal range of motion and neck supple  Skin:     General: Skin is warm and dry  Findings: No rash  Neurological:      General: No focal deficit present  Mental Status: He is alert and oriented to person, place, and time  Cranial Nerves: No cranial nerve deficit  Psychiatric:         Mood and Affect: Mood normal          Behavior: Behavior normal          Thought Content: Thought content normal          Judgment: Judgment normal          Vital Signs  ED Triage Vitals   Temperature Pulse Respirations Blood Pressure SpO2   09/02/21 2259 09/02/21 2259 09/02/21 2259 09/02/21 2259 09/02/21 2259   97 8 °F (36 6 °C) 69 20 (!) 189/98 98 %      Temp Source Heart Rate Source Patient Position - Orthostatic VS BP Location FiO2 (%)   09/02/21 2259 09/02/21 2259 09/02/21 2259 09/02/21 2259 --   Temporal Monitor Lying Left arm       Pain Score       09/03/21 0330       No Pain           Vitals:    09/03/21 0230 09/03/21 0415 09/03/21 0500 09/03/21 0600   BP: 131/76 154/72 134/75 141/72   Pulse: 66 60 59 60   Patient Position - Orthostatic VS: Sitting   Lying         Visual Acuity      ED Medications  Medications   aspirin chewable tablet 81 mg (has no administration in time range)   cholecalciferol (VITAMIN D3) tablet 2,000 Units (has no administration in time range)   apixaban (ELIQUIS) tablet 5 mg (has no administration in time range)   levothyroxine tablet 25 mcg (has no administration in time range)   pantoprazole (PROTONIX) EC tablet 40 mg (40 mg Oral Given 9/3/21 0638)   pravastatin (PRAVACHOL) tablet 80 mg (has no administration in time range)   aspirin chewable tablet 324 mg (324 mg Oral Given 9/2/21 2305)       Diagnostic Studies  Results Reviewed     Procedure Component Value Units Date/Time    Troponin I [[id number]] Collected: 09/03/21 0635    Lab Status:  In process Specimen: Blood from Arm, Left Updated: 09/03/21 0638    Troponin I [[id number]]  (Normal) Collected: 09/03/21 0326    Lab Status: Final result Specimen: Blood from Arm, Right Updated: 09/03/21 0353     Troponin I <0 02 ng/mL     Lactic acid [[id number]]  (Normal) Collected: 09/02/21 2302    Lab Status: Final result Specimen: Blood from Arm, Right Updated: 09/02/21 2337     LACTIC ACID 1 1 mmol/L     Narrative:      Result may be elevated if tourniquet was used during collection      Comprehensive metabolic panel [[id number]]  (Abnormal) Collected: 09/02/21 2302    Lab Status: Final result Specimen: Blood from Arm, Right Updated: 09/02/21 2333     Sodium 138 mmol/L      Potassium 4 2 mmol/L      Chloride 104 mmol/L      CO2 28 mmol/L      ANION GAP 6 mmol/L      BUN 23 mg/dL      Creatinine 1 44 mg/dL      Glucose 80 mg/dL      Calcium 8 6 mg/dL      AST 21 U/L      ALT 26 U/L      Alkaline Phosphatase 64 U/L      Total Protein 7 3 g/dL      Albumin 3 8 g/dL      Total Bilirubin 0 36 mg/dL      eGFR 45 ml/min/1 73sq m     Narrative:      [place] guidelines for Chronic Kidney Disease (CKD):     Stage 1 with normal or high GFR (GFR > 90 mL/min/1 73 square meters)    Stage 2 Mild CKD (GFR = 60-89 mL/min/1 73 square meters)    Stage 3A Moderate CKD (GFR = 45-59 mL/min/1 73 square meters)    Stage 3B Moderate CKD (GFR = 30-44 mL/min/1 73 square meters)    Stage 4 Severe CKD (GFR = 15-29 mL/min/1 73 square meters)    Stage 5 End Stage CKD (GFR <15 mL/min/1 73 square meters)  Note: GFR calculation is accurate only with a steady state creatinine    NT-BNP PRO [[id number]]  (Normal) Collected: 09/02/21 2302    Lab Status: Final result Specimen: Blood from Arm, Right Updated: 09/02/21 2333     NT-proBNP 223 pg/mL     Troponin I [[id number]]  (Normal) Collected: 09/02/21 2302    Lab Status: Final result Specimen: Blood from Arm, Right Updated: 09/02/21 2330     Troponin I <0 02 ng/mL     D-Dimer [[id number]]  (Normal) Collected: 09/02/21 2302    Lab Status: Final result Specimen: Blood from Arm, Right Updated: 09/02/21 2323     D-Dimer, Quant 0 35 ug/ml FEU     CBC and differential [[id number]] Collected: 09/02/21 2302    Lab Status: Final result Specimen: Blood from Arm, Right Updated: 09/02/21 2323     WBC 7 47 Thousand/uL      RBC 4 75 Million/uL      Hemoglobin 14 7 g/dL      Hematocrit 43 9 %      MCV 92 fL      MCH 30 9 pg      MCHC 33 5 g/dL      RDW 12 9 %      MPV 10 2 fL      Platelets 838 Thousands/uL      nRBC 0 /100 WBCs      Neutrophils Relative 46 %      Immat GRANS % 0 %      Lymphocytes Relative 39 %      Monocytes Relative 11 %      Eosinophils Relative 3 %      Basophils Relative 1 %      Neutrophils Absolute 3 41 Thousands/µL      Immature Grans Absolute 0 03 Thousand/uL      Lymphocytes Absolute 2 89 Thousands/µL      Monocytes Absolute 0 85 Thousand/µL      Eosinophils Absolute 0 21 Thousand/µL      Basophils Absolute 0 08 Thousands/µL                  XR chest 1 view portable   ED Interpretation by Brennan Jj MD (09/02 2337)   NAD                 Procedures  ECG 12 Lead Documentation Only    Date/Time: 9/2/2021 11:05 PM  Performed by: Brennan Jj MD  Authorized by: Brennan Jj MD     ECG reviewed by me, the ED Provider: yes    Patient location:  ED  Previous ECG:     Previous ECG:  Compared to current    Similarity:  No change  Rate:     ECG rate:  70  Rhythm:     Rhythm: paced    Ectopy:     Ectopy: none    QRS:     QRS axis:  Normal             ED Course             HEART Risk Score      Most Recent Value   Heart Score Risk Calculator   History  1 Filed at: 09/02/2021 2306   ECG  1 Filed at: 09/02/2021 2306   Age  2 Filed at: 09/02/2021 2306   Risk Factors  1 Filed at: 09/02/2021 2306   Troponin  0 Filed at: 09/02/2021 2306   HEART Score  5 Filed at: 09/02/2021 2306                        VICKI Risk Score      Most Recent Value   Age >= 72  1 Filed at: 09/03/2021 0251   Known CAD (stenosis >= 50%)  0 Filed at: 09/03/2021 0251   Recent (<=24 hrs) Service Angina  0 Filed at: 09/03/2021 0251   ST Deviation >= 0 5 mm  0 Filed at: 09/03/2021 0251   3+ CAD Risk Factors (FHx, HTN, HLP, DM, Smoker)  0 Filed at: 09/03/2021 0251   Aspirin Use Past 7 Days  0 Filed at: 09/03/2021 0251   Elevated Cardiac Markers  0 Filed at: 09/03/2021 0251   VICKI Risk Score (Calculated)  1 Filed at: 09/03/2021 0251                  MDM  Number of Diagnoses or Management Options  Diagnosis management comments: Patient with concerning story pain across his chest going down the left arm intermittently for the past 1 day  Has been short of breath  Was diaphoretic tonight  Symptoms last anywhere from 5-15 minutes  No recent stress test   Patient has a heart score of 5  Will keep overnight for observation and likely stress test in the morning  Amount and/or Complexity of Data Reviewed  Clinical lab tests: reviewed  Review and summarize past medical records: yes        Disposition  Final diagnoses:   Chest pain, unspecified type     Time reflects when diagnosis was documented in both MDM as applicable and the Disposition within this note     Time User Action Codes Description Comment    9/2/2021 11:49 PM Jazzy More Add [R07 9] Chest pain, unspecified type     9/3/2021  2:52 AM Martínez, Frida Add [I42 0] Dilated cardiomyopathy ([facility] [address] )     9/3/2021  2:52 AM Martínez, Frida Remove [I42 0] Dilated cardiomyopathy [facility])       ED Disposition     ED Disposition Condition Date/Time Comment    Admit Stable u Sep 2, 2021 11:53 PM Case was discussed with Frida Soliz and the patient's admission status was agreed to be  to the service of Dr Nadya Kang  Follow-up Information    None         Patient's Medications   Discharge Prescriptions    No medications on file     No discharge procedures on file      PDMP Review     None          ED Provider  Electronically Signed by           Medardo Fine MD  09/02/21 [address] [place] [address] Cira Light MD  09/03/21 [id number]

## 2021-09-03 NOTE — ASSESSMENT & PLAN NOTE
· Creatinine 1 44 on admission, most recent 7 months ago was 1 08  · Reports has resumed long distance bicycling and is now cycling 50 miles per week  · Trend creatinine as outpatient as this is not SARAHI definition  · Renally dose medications  · Check urinalysis

## 2021-09-03 NOTE — ED NOTES
cardiology at bedside to eval and speak with pt     Welby Brittle, RN  09/03/21 [facility], RN  09/03/21 1006

## 2021-09-08 ENCOUNTER — IN-CLINIC DEVICE VISIT (OUTPATIENT)
Dept: CARDIOLOGY CLINIC | Facility: CLINIC | Age: 81
End: 2021-09-08
Payer: MEDICARE

## 2021-09-08 DIAGNOSIS — Z95.0 PRESENCE OF CARDIAC PACEMAKER: Primary | ICD-10-CM

## 2021-09-08 PROCEDURE — 93280 PM DEVICE PROGR EVAL DUAL: CPT | Performed by: INTERNAL MEDICINE

## 2021-09-08 NOTE — PROGRESS NOTES
Results for orders placed or performed in visit on 09/08/21   Cardiac EP device report    Narrative    mdt dual chamber pacemaker  DEVICE INTERROGATED IN THE [place] OFFICE: PT NEW TO [facility]  BATTERY VOLTAGE NEARING LAKESHIA (2 MOS~<1-12 MOS~2 63V~7,022 OHMS)   WILL SCHEDULE MONTHLY BATTERY CHECKS  AP: 84 7%  : 20 1% (MVP-ON)  ALL LEAD PARAMETERS WITHIN NORMAL LIMITS  4 VHR EPISODES W/ AVAIL MARKERS SHOWING NSVT 14 BEATS @ 194 BPM  11 AHR EPISODES W/ AVAIL EGRMS SHOWING AF, MAX DURATION 3 HRS 58 MINS  PT TAKES ELIQUIS, ASA 81MG  EF: 45% (NUC ST TX 9/3/21)  NO PROGRAMMING CHANGES MADE TO DEVICE PARAMETERS  PACEMAKER FUNCTIONING APPROPRIATELY    [address] [place] [address] Street

## 2021-09-16 ENCOUNTER — HOSPITAL ENCOUNTER (OUTPATIENT)
Dept: CT IMAGING | Facility: HOSPITAL | Age: 81
Discharge: HOME | End: 2021-09-16
Payer: MEDICARE

## 2021-09-16 ENCOUNTER — OFFICE VISIT (OUTPATIENT)
Dept: CARDIOLOGY CLINIC | Facility: CLINIC | Age: 81
End: 2021-09-16
Payer: MEDICARE

## 2021-09-16 VITALS
SYSTOLIC BLOOD PRESSURE: 124 MMHG | HEART RATE: 82 BPM | DIASTOLIC BLOOD PRESSURE: 82 MMHG | HEIGHT: 70 IN | OXYGEN SATURATION: 97 % | WEIGHT: 182 LBS | BODY MASS INDEX: 26.05 KG/M2

## 2021-09-16 DIAGNOSIS — R07.9 CHEST PAIN, UNSPECIFIED TYPE: ICD-10-CM

## 2021-09-16 DIAGNOSIS — Z86.79 HISTORY OF TACHYCARDIA-BRADYCARDIA SYNDROME: ICD-10-CM

## 2021-09-16 DIAGNOSIS — I48.0 PAROXYSMAL ATRIAL FIBRILLATION (HCC): Primary | ICD-10-CM

## 2021-09-16 DIAGNOSIS — I42.0 DILATED CARDIOMYOPATHY (HCC): ICD-10-CM

## 2021-09-16 DIAGNOSIS — J94.2 HEMOTHORAX ON LEFT: ICD-10-CM

## 2021-09-16 DIAGNOSIS — I77.810 DILATED AORTIC ROOT (HCC): ICD-10-CM

## 2021-09-16 DIAGNOSIS — I34.0 MITRAL VALVE INSUFFICIENCY, UNSPECIFIED ETIOLOGY: ICD-10-CM

## 2021-09-16 DIAGNOSIS — E78.2 MIXED HYPERLIPIDEMIA: ICD-10-CM

## 2021-09-16 PROCEDURE — 99214 OFFICE O/P EST MOD 30 MIN: CPT | Performed by: NURSE PRACTITIONER

## 2021-09-16 PROCEDURE — 71250 CT THORAX DX C-: CPT

## 2021-09-16 RX ORDER — METOPROLOL SUCCINATE 25 MG/1
25 TABLET, EXTENDED RELEASE ORAL DAILY
Qty: 30 TABLET | Refills: 0 | Status: SHIPPED | OUTPATIENT
Start: 2021-09-16 | End: 2021-10-07 | Stop reason: SDUPTHER

## 2021-09-16 NOTE — PATIENT INSTRUCTIONS
We reviewed the results of your pacemaker interrogation  You had episodes of VT as well as episodes of atrial fibrillation  This places a higher stroke risk, so we will keep the Eliquis long term  Will add metoprolol succinate 1/2 tab daily for 2 weeks then may increase to full dose 25mg daily  Please let us know after 2 weeks how you are tolerating with your blood pressure and we can send 90 day supply  I will let you know if any additional imaging is recommended  Any recurrent or worsening chest pain proceed to ER

## 2021-09-17 PROBLEM — I48.0 PAROXYSMAL ATRIAL FIBRILLATION (HCC): Status: ACTIVE | Noted: 2021-09-17

## 2021-09-17 NOTE — ASSESSMENT & PLAN NOTE
Pt admitted to [facility] on 9/2/2021 when he presented with left sided chest pain and diaphoresis  Cardiac work up including ECG, troponin levels, and nuclear stress test were unrevealing  He describes the pain as a burning that starts at the area of his prior rib fracture and radiates across his upper abdomen  Pain started and resolved spontaneously and has not recurred  His PCP has ordered an updated CT of his chest, which was completed today with results pending  Advised that he report any return of symptoms

## 2021-09-17 NOTE — ASSESSMENT & PLAN NOTE
Mild to moderate MR noted on HUSSEIN in 6/2020  Echocardiogram 8/2021 continues to show mild-moderate MR  Will continue to follow with repeat echo in 6 months

## 2021-09-17 NOTE — ASSESSMENT & PLAN NOTE
Pt with fall resulting in broken ribs and hemothorax in 10/2020  He has been dealing with intermittent pain in the left rib region  His PCP has ordered a CT which he completed today

## 2021-09-17 NOTE — ASSESSMENT & PLAN NOTE
Pacemaker device interrogation on 9/8/2021 reveals 11 episodes of atrial fibrillation, longest episode lasting 3 hours and 58 minutes  This is a new finding  Pt already on Eliquis 5mg BID for DVT treatment  Advised that we will continue this anticoagulation long-term for stroke prevention  Trial addition of Metoprolol succinate 12 5mg daily x 2 weeks and if tolerated increase to 25mg daily

## 2021-09-17 NOTE — ASSESSMENT & PLAN NOTE
Pt has a history of reduced LVEF  He had an abnormal stress test in 2009 and underwent cardiac cath at that time that revealed no obvious coronary disease  Holter monitor in 2019 revealed occasional PAC's and rare PVC's  HUSSEIN in 6/2020 with EF 40-45%  His most recent echocardiogram 8/6/2021 reveals EF of 50%  Nuclear stress test 9/3/2021 with no evidence of ischemia  Pacemaker interrogation on 9/8/21 reveals 4 episodes of NSVT and 11 episodes of atrial fibrillation, longest 3 hours 58 minutes  Will initiate Metoprolol succinate starting at 12 5mg daily and increase to 25mg daily in 2 weeks if tolerated  I would like to initiate ACE/ARB, however his BP is borderline hypotensive   Will up-titrate therapy as able with BP

## 2021-09-17 NOTE — ASSESSMENT & PLAN NOTE
History of sick sinus syndrome resulting in pacemaker insertion in June of 2009  [address] recently took over device management  Device interrogation on 9/8/2021 reveals normal device function  Device nearing LAKESHIA  Now having monthly device checks

## 2021-09-17 NOTE — ASSESSMENT & PLAN NOTE
Aortic root has measured 3 8-4cm on prior imaging  Echocardiogram in 8/2021 with aortic root measurement of 3 5cm  Will continue to follow with future echocardiograms

## 2021-09-17 NOTE — ASSESSMENT & PLAN NOTE
Lipid panel 11/2020:   TG 74  HDL 75  LDL 50  Pt remains on Simvastatin 40mg daily  He has orders for repeat lipid panel at his PCP office for next month

## 2021-09-17 NOTE — PROGRESS NOTES
Cardiology Follow Up    Mal Bach  1940  [id number]  [facility]  [address]  [address] [facility] [address] [facility]  [phone]    Toño Hanna presents today for follow up from his recent hospitalization for chest pain  1  Paroxysmal atrial fibrillation ([facility] [address] )  Assessment & Plan:  Pacemaker device interrogation on 9/8/2021 reveals 11 episodes of atrial fibrillation, longest episode lasting 3 hours and 58 minutes  This is a new finding  Pt already on Eliquis 5mg BID for DVT treatment  Advised that we will continue this anticoagulation long-term for stroke prevention  Trial addition of Metoprolol succinate 12 5mg daily x 2 weeks and if tolerated increase to 25mg daily  Orders:  -     metoprolol succinate (TOPROL-XL) 25 mg 24 hr tablet; Take 1 tablet (25 mg total) by mouth daily Start with 1/2 tab daily for 2 weeks then ok to go to full pill    2  Dilated cardiomyopathy ([facility] )  Assessment & Plan:  Pt has a history of reduced LVEF  He had an abnormal stress test in 2009 and underwent cardiac cath at that time that revealed no obvious coronary disease  Holter monitor in 2019 revealed occasional PAC's and rare PVC's  HUSSEIN in 6/2020 with EF 40-45%  His most recent echocardiogram 8/6/2021 reveals EF of 50%  Nuclear stress test 9/3/2021 with no evidence of ischemia  Pacemaker interrogation on 9/8/21 reveals 4 episodes of NSVT and 11 episodes of atrial fibrillation, longest 3 hours 58 minutes  Will initiate Metoprolol succinate starting at 12 5mg daily and increase to 25mg daily in 2 weeks if tolerated  I would like to initiate ACE/ARB, however his BP is borderline hypotensive  Will up-titrate therapy as able with BP        3  Dilated aortic root ([facility] [address] )  Assessment & Plan: Aortic root has measured 3 8-4cm on prior imaging  Echocardiogram in 8/2021 with aortic root measurement of 3 5cm  Will continue to follow with future echocardiograms          4  Mitral valve insufficiency, unspecified etiology  Assessment & Plan:  Mild to moderate MR noted on HUSSEIN in 6/2020  Echocardiogram 8/2021 continues to show mild-moderate MR  Will continue to follow with repeat echo in 6 months  5  Hemothorax on left  Assessment & Plan:  Pt with fall resulting in broken ribs and hemothorax in 10/2020  He has been dealing with intermittent pain in the left rib region  His PCP has ordered a CT which he completed today      6  History of tachycardia-bradycardia syndrome  Assessment & Plan:  History of sick sinus syndrome resulting in pacemaker insertion in June of 2009  [address] recently took over device management  Device interrogation on 9/8/2021 reveals normal device function  Device nearing LAKESHIA  Now having monthly device checks  7  Chest pain, unspecified type  Assessment & Plan:  Pt admitted to [address] [place] [address] on 9/2/2021 when he presented with left sided chest pain and diaphoresis  Cardiac work up including ECG, troponin levels, and nuclear stress test were unrevealing  He describes the pain as a burning that starts at the area of his prior rib fracture and radiates across his upper abdomen  Pain started and resolved spontaneously and has not recurred  His PCP has ordered an updated CT of his chest, which was completed today with results pending  Advised that he report any return of symptoms  8  Mixed hyperlipidemia  Assessment & Plan:  Lipid panel 11/2020:   TG 74  HDL 75  LDL 50  Pt remains on Simvastatin 40mg daily  He has orders for repeat lipid panel at his PCP office for next month  HPI    Chad Henson has a past medical history of sick sinus syndrome requiring PPM in 6/2009, dilated cardiomyopathy, fall with rib fracture and resulting hemothorax in 10/2020, and subsequent DVT on Eliquis  He was initially following with Dr Otilio Jarrett at the [address] [place] [address] Drive  He transferred to [facility] in spring 2021      He met with Dr Tate Cardenas on 8/27/2021 for consultation  His pacemaker device management was transitioned to [facility] at that time  9/16/2021: Olga Lidia Rose presents today accompanied by his wife  He presented to [address] [place] [address] on 9/2/2021 when he developed sudden onset of left sided chest pain while sitting in his recliner  He states the pain wrapped across his upper abdomen  He felt nauseous and diaphoretic which prompted him to go to the hospital  He was admitted for observation  He states the pain resolved spontaneously without treatment within 1 hour of coming to the hospital  He does note that the pain originated from the site of his broken rib  Troponin levels remained negative  He underwent nuclear stress test which showed no evidence of ischemia and he was discharged to home  He denies any recurrent chest pain  He states that he is tolerating activity well  Of note, his pacemaker interrogation on 9/8/2021 revealed 4 episodes of NSVT and 11 episodes of atrial fibrillation with the longest episode lasting 3 hrs and 58 minutes  He denies feeling palpitations, lightheadedness, dizziness, or shortness of breath  He reports he only gets leg swelling after being on his feet all day  He denies snoring or symptoms of sleep apnea, no daytime sleepiness  Medical Problems     Problem List     Non-seasonal allergic rhinitis    Acute respiratory failure with hypoxia ([facility] [address] )    Hemothorax on left    Overview Signed 12/9/2020  3:53 PM by Nava Vegas PA-C     Diagnosis: Left pleural effusion/hemothorax  Procedure: Flexible bronchoscopy, left thoracoscopic evacuation of hemothorax with complete decortication performed on 11/21/20  Pathology: Left pleural peel revealed benign fibrinous and degenerative material with mixed inflammation, compatible with hemothorax  All cultures negative to date            Closed fracture of multiple ribs of left side with routine healing    History of tachycardia-bradycardia syndrome    Bladder tumor    MR (mitral regurgitation) Normocytic anemia    Chest pain    Bradycardia    Premature atrial contractions    Hyperlipidemia    Dilated aortic root (HCC)    Dilated cardiomyopathy (HCC)    History of venous thromboembolism    Other specified hypothyroidism    Elevated serum creatinine              Past Medical History:   Diagnosis Date    Bladder tumor     Cough     Dyspnea     Hyperlipidemia     Left knee pain     Pacemaker     Pleural effusion     Rib fractures     Right knee pain      Social History     Socioeconomic History    Marital status: /Civil Union     Spouse name: Not on file    Number of children: Not on file    Years of education: Not on file    Highest education level: Not on file   Occupational History    Not on file   Tobacco Use    Smoking status: Never Smoker    Smokeless tobacco: Never Used   Vaping Use    Vaping Use: Never used   Substance and Sexual Activity    Alcohol use: Not Currently    Drug use: Not Currently    Sexual activity: Not Currently   Other Topics Concern    Not on file   Social History Narrative    Not on file     Social Determinants of Health     Financial Resource Strain:     Difficulty of Paying Living Expenses:    Food Insecurity:     Worried About Running Out of Food in the Last Year:     Ran Out of Food in the Last Year:    Transportation Needs:     Lack of Transportation (Medical):      Lack of Transportation (Non-Medical):    Physical Activity:     Days of Exercise per Week:     Minutes of Exercise per Session:    Stress:     Feeling of Stress :    Social Connections:     Frequency of Communication with Friends and Family:     Frequency of Social Gatherings with Friends and Family:     Attends [employer] Services:     Active Member of Clubs or Organizations:     Attends Club or Organization Meetings:     Marital Status:    Intimate Partner Violence:     Fear of Current or Ex-Partner:     Emotionally Abused:     Physically Abused:     Sexually Abused: Family History   Problem Relation Age of Onset    Lung cancer Mother     Colon cancer Father     COPD Sister      Past Surgical History:   Procedure Laterality Date    BLADDER TUMOR EXCISION      CARDIAC PACEMAKER PLACEMENT      HERNIA REPAIR      X2    THORACOSCOPY VIDEO ASSISTED SURGERY (VATS) Left 11/21/2020    Procedure: THORACOSCOPY VIDEO ASSISTED SURGERY (VATS), washout of hemothorax, decortication;  Surgeon: Sylvie Dennis MD;  Location: [facility];  Service: Thoracic    TONSILLECTOMY         Current Outpatient Medications:     aspirin (ECOTRIN LOW STRENGTH) 81 mg EC tablet, Take 81 mg by mouth , Disp: , Rfl:     Cholecalciferol 50 MCG (2000 UT) CAPS, Take by mouth, Disp: , Rfl:     Coenzyme Q10 (CO Q 10 PO), Take by mouth, Disp: , Rfl:     Cyanocobalamin (VITAMIN B 12 PO), Take by mouth, Disp: , Rfl:     Eliquis 5 MG, Take 5 mg by mouth 2 (two) times a day , Disp: , Rfl:     levothyroxine 25 mcg tablet, , Disp: , Rfl:     pantoprazole (Protonix) 40 mg tablet, Take 40 mg by mouth daily , Disp: , Rfl:     simvastatin (ZOCOR) 40 mg tablet, Take 40 mg by mouth daily at bedtime , Disp: , Rfl:     acetaminophen (TYLENOL) 325 mg tablet, Take 2 tablets (650 mg total) by mouth 4 (four) times a day, Disp:  , Rfl: 0    metoprolol succinate (TOPROL-XL) 25 mg 24 hr tablet, Take 1 tablet (25 mg total) by mouth daily Start with 1/2 tab daily for 2 weeks then ok to go to full pill, Disp: 30 tablet, Rfl: 0  Allergies   Allergen Reactions    Penicillins Anaphylaxis and Hives       Labs:     Chemistry        Component Value Date/Time    K 4 2 09/02/2021 2302     09/02/2021 2302    CO2 28 09/02/2021 2302    BUN 23 09/02/2021 2302    CREATININE 1 44 (H) 09/02/2021 2302        Component Value Date/Time    CALCIUM 8 6 09/02/2021 2302    ALKPHOS 64 09/02/2021 2302    AST 21 09/02/2021 2302    ALT 26 09/02/2021 2302            No results found for: CHOL  Lab Results   Component Value Date    HDL 75 11/20/2020     Lab Results   Component Value Date    LDLCALC 50 11/20/2020     Lab Results   Component Value Date    TRIG 74 11/20/2020       Cardiac imaging:  Cardiac EP device report 9/8/2021:  BATTERY VOLTAGE NEARING LAKESHIA (2 MOS~<1-12 MOS~2 63V~7,022 OHMS)   WILL SCHEDULE MONTHLY BATTERY CHECKS  AP: 84 7%  : 20 1% (MVP-ON)  ALL LEAD PARAMETERS WITHIN NORMAL LIMITS  4 VHR EPISODES W/ AVAIL MARKERS SHOWING NSVT 14 BEATS @ 194 BPM  11 AHR EPISODES W/ AVAIL EGRMS SHOWING AF, MAX DURATION 3 HRS 58 MINS  PT TAKES ELIQUIS, ASA 81MG  EF: 45% (NUC ST TX 9/3/21)  NO PROGRAMMING CHANGES MADE TO DEVICE PARAMETERS  PACEMAKER FUNCTIONING APPROPRIATELY  NM Myocardial Perfusion Spect (Exercise Induced Stress and/or Rest) 9/3/2021:  Duration of exercise was 7 min and 0 sec  Target heart rate was achieved  There was no chest pain during stress  -  Gated SPECT: The calculated left ventricular ejection fraction was 45 %  There was mild global left ventricular hypokinesis  IMPRESSIONS: No evidence of ischemia/myocardium at risk  Inferior wall perfusion abnormality noted which may represent attenuation artifact given reasonable wall thickening in this segment  Low risk perfusion findings  Mildly reduced LV function suggested on gated analysis  Echocardiogram 8/6/2021:  EF 52% Grade 1 diastolic dysfunction  Mild to moderate MR  Mild TR  Trace CT  Aortic root 3 5cm, ascending aorta 3 6cm  Compared to prior study 5/27/2020 the mitral regurgitation was previously reported to be possibly severe but appears at most moderate on this study  No other significant changes  HUSSEIN 6/17/2020:  EF 40-45%  Left atrium mildly dilated  Mild to moderate MR  Review of Systems   Constitutional: Negative  HENT: Negative  Cardiovascular: Negative for chest pain, dyspnea on exertion, irregular heartbeat, leg swelling, near-syncope, orthopnea and palpitations  Respiratory: Negative for cough and snoring  Endocrine: Negative  Skin: Negative  Musculoskeletal: Negative  Gastrointestinal: Negative  Genitourinary: Negative  Neurological: Negative  Psychiatric/Behavioral: Negative  Vitals:    09/16/21 1458   BP: 124/82   Pulse: 82   SpO2: 97%     Vitals:    09/16/21 1458   Weight: 82 6 kg (182 lb)     Height: 5' 10" (177 8 cm)   Body mass index is 26 11 kg/m²  Physical Exam  Vitals and nursing note reviewed  Constitutional:       General: He is not in acute distress  Appearance: He is well-developed  He is not diaphoretic  HENT:      Head: Normocephalic and atraumatic  Neck:      Vascular: No carotid bruit or JVD  Cardiovascular:      Rate and Rhythm: Normal rate and regular rhythm  Occasional extrasystoles are present  Pulses: Intact distal pulses  Heart sounds: Normal heart sounds, S1 normal and S2 normal  No murmur heard  No friction rub  No gallop  Comments: No lower leg edema  Pulmonary:      Effort: Pulmonary effort is normal  No respiratory distress  Breath sounds: Normal breath sounds  Abdominal:      General: There is no distension  Palpations: Abdomen is soft  Tenderness: There is no abdominal tenderness  Skin:     General: Skin is warm and dry  Findings: No rash  Neurological:      Mental Status: He is alert and oriented to person, place, and time  Psychiatric:         Mood and Affect: Mood normal          Speech: Speech normal          Behavior: Behavior normal  Behavior is cooperative           Cognition and Memory: Cognition normal

## 2021-09-27 LAB
ARRHY DURING EX: NORMAL
CHEST PAIN STATEMENT: NORMAL
MAX DIASTOLIC BP: 80 MMHG
MAX HEART RATE: 122 BPM
MAX PREDICTED HEART RATE: 137 BPM
MAX. SYSTOLIC BP: 170 MMHG
PROTOCOL NAME: NORMAL
REASON FOR TERMINATION: NORMAL
TARGET HR FORMULA: NORMAL
TEST INDICATION: NORMAL
TIME IN EXERCISE PHASE: NORMAL

## 2021-10-14 ENCOUNTER — IN-CLINIC DEVICE VISIT (OUTPATIENT)
Dept: CARDIOLOGY CLINIC | Facility: HOSPITAL | Age: 81
End: 2021-10-14

## 2021-10-14 ENCOUNTER — CONSULT (OUTPATIENT)
Dept: CARDIOLOGY CLINIC | Facility: CLINIC | Age: 81
End: 2021-10-14
Payer: MEDICARE

## 2021-10-14 VITALS
SYSTOLIC BLOOD PRESSURE: 120 MMHG | HEART RATE: 65 BPM | HEIGHT: 70 IN | WEIGHT: 185.2 LBS | BODY MASS INDEX: 26.51 KG/M2 | DIASTOLIC BLOOD PRESSURE: 72 MMHG

## 2021-10-14 DIAGNOSIS — Z95.0 PRESENCE OF CARDIAC PACEMAKER: Primary | ICD-10-CM

## 2021-10-14 DIAGNOSIS — Z45.010 PACEMAKER AT END OF BATTERY LIFE: ICD-10-CM

## 2021-10-14 DIAGNOSIS — I48.0 PAROXYSMAL ATRIAL FIBRILLATION (HCC): Primary | ICD-10-CM

## 2021-10-14 PROCEDURE — 93000 ELECTROCARDIOGRAM COMPLETE: CPT | Performed by: INTERNAL MEDICINE

## 2021-10-14 PROCEDURE — 99204 OFFICE O/P NEW MOD 45 MIN: CPT | Performed by: INTERNAL MEDICINE

## 2021-10-14 PROCEDURE — RECHECK: Performed by: INTERNAL MEDICINE

## 2021-10-18 ENCOUNTER — PREP FOR PROCEDURE (OUTPATIENT)
Dept: CARDIOLOGY CLINIC | Facility: CLINIC | Age: 81
End: 2021-10-18

## 2021-10-18 DIAGNOSIS — I48.20 CHRONIC ATRIAL FIBRILLATION, UNSPECIFIED (HCC): ICD-10-CM

## 2021-10-18 DIAGNOSIS — Z45.010 PACEMAKER AT END OF BATTERY LIFE: Primary | ICD-10-CM

## 2021-10-19 ENCOUNTER — APPOINTMENT (OUTPATIENT)
Dept: LAB | Facility: HOSPITAL | Age: 81
End: 2021-10-19
Payer: MEDICARE

## 2021-10-19 DIAGNOSIS — Z45.010 PACEMAKER AT END OF BATTERY LIFE: ICD-10-CM

## 2021-10-19 DIAGNOSIS — I48.20 CHRONIC ATRIAL FIBRILLATION, UNSPECIFIED (HCC): ICD-10-CM

## 2021-10-19 LAB
ALBUMIN SERPL BCP-MCNC: 3.6 G/DL (ref 3.5–5)
ALP SERPL-CCNC: 66 U/L (ref 46–116)
ALT SERPL W P-5'-P-CCNC: 29 U/L (ref 12–78)
ANION GAP SERPL CALCULATED.3IONS-SCNC: 3 MMOL/L (ref 4–13)
AST SERPL W P-5'-P-CCNC: 19 U/L (ref 5–45)
BASOPHILS # BLD AUTO: 0.11 THOUSANDS/ΜL (ref 0–0.1)
BASOPHILS NFR BLD AUTO: 1 % (ref 0–1)
BILIRUB SERPL-MCNC: 0.56 MG/DL (ref 0.2–1)
BUN SERPL-MCNC: 19 MG/DL (ref 5–25)
CALCIUM SERPL-MCNC: 9.1 MG/DL (ref 8.3–10.1)
CHLORIDE SERPL-SCNC: 105 MMOL/L (ref 100–108)
CO2 SERPL-SCNC: 30 MMOL/L (ref 21–32)
CREAT SERPL-MCNC: 1.27 MG/DL (ref 0.6–1.3)
EOSINOPHIL # BLD AUTO: 0.26 THOUSAND/ΜL (ref 0–0.61)
EOSINOPHIL NFR BLD AUTO: 3 % (ref 0–6)
ERYTHROCYTE [DISTWIDTH] IN BLOOD BY AUTOMATED COUNT: 13.3 % (ref 11.6–15.1)
GFR SERPL CREATININE-BSD FRML MDRD: 52 ML/MIN/1.73SQ M
GLUCOSE P FAST SERPL-MCNC: 122 MG/DL (ref 65–99)
HCT VFR BLD AUTO: 46.4 % (ref 36.5–49.3)
HGB BLD-MCNC: 15.4 G/DL (ref 12–17)
IMM GRANULOCYTES # BLD AUTO: 0.03 THOUSAND/UL (ref 0–0.2)
IMM GRANULOCYTES NFR BLD AUTO: 0 % (ref 0–2)
INR PPP: 1.03 (ref 0.84–1.19)
LYMPHOCYTES # BLD AUTO: 2.87 THOUSANDS/ΜL (ref 0.6–4.47)
LYMPHOCYTES NFR BLD AUTO: 37 % (ref 14–44)
MCH RBC QN AUTO: 30.8 PG (ref 26.8–34.3)
MCHC RBC AUTO-ENTMCNC: 33.2 G/DL (ref 31.4–37.4)
MCV RBC AUTO: 93 FL (ref 82–98)
MONOCYTES # BLD AUTO: 0.8 THOUSAND/ΜL (ref 0.17–1.22)
MONOCYTES NFR BLD AUTO: 10 % (ref 4–12)
NEUTROPHILS # BLD AUTO: 3.7 THOUSANDS/ΜL (ref 1.85–7.62)
NEUTS SEG NFR BLD AUTO: 49 % (ref 43–75)
NRBC BLD AUTO-RTO: 0 /100 WBCS
PLATELET # BLD AUTO: 209 THOUSANDS/UL (ref 149–390)
PMV BLD AUTO: 11.1 FL (ref 8.9–12.7)
POTASSIUM SERPL-SCNC: 5.1 MMOL/L (ref 3.5–5.3)
PROT SERPL-MCNC: 7.2 G/DL (ref 6.4–8.2)
PROTHROMBIN TIME: 13.4 SECONDS (ref 11.6–14.5)
RBC # BLD AUTO: 5 MILLION/UL (ref 3.88–5.62)
SODIUM SERPL-SCNC: 138 MMOL/L (ref 136–145)
WBC # BLD AUTO: 7.77 THOUSAND/UL (ref 4.31–10.16)

## 2021-10-19 PROCEDURE — 85025 COMPLETE CBC W/AUTO DIFF WBC: CPT

## 2021-10-19 PROCEDURE — 85610 PROTHROMBIN TIME: CPT

## 2021-10-19 PROCEDURE — 36415 COLL VENOUS BLD VENIPUNCTURE: CPT

## 2021-10-19 PROCEDURE — 80053 COMPREHEN METABOLIC PANEL: CPT

## 2021-10-20 ENCOUNTER — TELEPHONE (OUTPATIENT)
Dept: CARDIOLOGY CLINIC | Facility: CLINIC | Age: 81
End: 2021-10-20

## 2021-10-20 ENCOUNTER — ANESTHESIA EVENT (OUTPATIENT)
Dept: NON INVASIVE DIAGNOSTICS | Facility: HOSPITAL | Age: 81
End: 2021-10-20
Payer: MEDICARE

## 2021-10-21 ENCOUNTER — HOSPITAL ENCOUNTER (OUTPATIENT)
Facility: HOSPITAL | Age: 81
Setting detail: OUTPATIENT SURGERY
Discharge: HOME | End: 2021-10-21
Attending: INTERNAL MEDICINE | Admitting: INTERNAL MEDICINE
Payer: MEDICARE

## 2021-10-21 ENCOUNTER — ANESTHESIA (OUTPATIENT)
Dept: NON INVASIVE DIAGNOSTICS | Facility: HOSPITAL | Age: 81
End: 2021-10-21
Payer: MEDICARE

## 2021-10-21 VITALS
SYSTOLIC BLOOD PRESSURE: 138 MMHG | HEART RATE: 59 BPM | TEMPERATURE: 97.6 F | RESPIRATION RATE: 17 BRPM | DIASTOLIC BLOOD PRESSURE: 76 MMHG | OXYGEN SATURATION: 96 %

## 2021-10-21 DIAGNOSIS — I48.0 PAROXYSMAL ATRIAL FIBRILLATION (HCC): ICD-10-CM

## 2021-10-21 DIAGNOSIS — Z95.0 PRESENCE OF CARDIAC PACEMAKER: Primary | ICD-10-CM

## 2021-10-21 DIAGNOSIS — Z45.010 PACEMAKER AT END OF BATTERY LIFE: ICD-10-CM

## 2021-10-21 PROBLEM — N40.0 BPH (BENIGN PROSTATIC HYPERPLASIA): Status: ACTIVE | Noted: 2019-11-22

## 2021-10-21 PROBLEM — K21.9 GASTROESOPHAGEAL REFLUX DISEASE: Status: ACTIVE | Noted: 2019-10-28

## 2021-10-21 LAB
ANION GAP SERPL CALCULATED.3IONS-SCNC: 2 MMOL/L (ref 4–13)
ATRIAL RATE: 65 BPM
ATRIAL RATE: 71 BPM
BUN SERPL-MCNC: 25 MG/DL (ref 5–25)
CALCIUM SERPL-MCNC: 8.9 MG/DL (ref 8.3–10.1)
CHLORIDE SERPL-SCNC: 106 MMOL/L (ref 100–108)
CO2 SERPL-SCNC: 30 MMOL/L (ref 21–32)
CREAT SERPL-MCNC: 1.1 MG/DL (ref 0.6–1.3)
ERYTHROCYTE [DISTWIDTH] IN BLOOD BY AUTOMATED COUNT: 13.5 % (ref 11.6–15.1)
GFR SERPL CREATININE-BSD FRML MDRD: 62 ML/MIN/1.73SQ M
GLUCOSE P FAST SERPL-MCNC: 108 MG/DL (ref 65–99)
GLUCOSE SERPL-MCNC: 108 MG/DL (ref 65–140)
HCT VFR BLD AUTO: 46.4 % (ref 36.5–49.3)
HGB BLD-MCNC: 15.3 G/DL (ref 12–17)
INR PPP: 1.12 (ref 0.84–1.19)
MCH RBC QN AUTO: 30.8 PG (ref 26.8–34.3)
MCHC RBC AUTO-ENTMCNC: 33 G/DL (ref 31.4–37.4)
MCV RBC AUTO: 94 FL (ref 82–98)
P AXIS: 62 DEGREES
PLATELET # BLD AUTO: 192 THOUSANDS/UL (ref 149–390)
PMV BLD AUTO: 11.5 FL (ref 8.9–12.7)
POTASSIUM SERPL-SCNC: 4.2 MMOL/L (ref 3.5–5.3)
PR INTERVAL: 304 MS
PROTHROMBIN TIME: 14 SECONDS (ref 11.6–14.5)
QRS AXIS: -78 DEGREES
QRS AXIS: 106 DEGREES
QRSD INTERVAL: 112 MS
QRSD INTERVAL: 182 MS
QT INTERVAL: 438 MS
QT INTERVAL: 478 MS
QTC INTERVAL: 455 MS
QTC INTERVAL: 497 MS
RBC # BLD AUTO: 4.96 MILLION/UL (ref 3.88–5.62)
SODIUM SERPL-SCNC: 138 MMOL/L (ref 136–145)
T WAVE AXIS: -62 DEGREES
T WAVE AXIS: 101 DEGREES
VENTRICULAR RATE: 65 BPM
VENTRICULAR RATE: 65 BPM
WBC # BLD AUTO: 7.51 THOUSAND/UL (ref 4.31–10.16)

## 2021-10-21 PROCEDURE — 33228 REMV&REPLC PM GEN DUAL LEAD: CPT | Performed by: INTERNAL MEDICINE

## 2021-10-21 PROCEDURE — 93010 ELECTROCARDIOGRAM REPORT: CPT | Performed by: INTERNAL MEDICINE

## 2021-10-21 PROCEDURE — 85027 COMPLETE CBC AUTOMATED: CPT | Performed by: PHYSICIAN ASSISTANT

## 2021-10-21 PROCEDURE — 85610 PROTHROMBIN TIME: CPT | Performed by: PHYSICIAN ASSISTANT

## 2021-10-21 PROCEDURE — 80048 BASIC METABOLIC PNL TOTAL CA: CPT | Performed by: PHYSICIAN ASSISTANT

## 2021-10-21 PROCEDURE — 93005 ELECTROCARDIOGRAM TRACING: CPT

## 2021-10-21 PROCEDURE — C1785 PMKR, DUAL, RATE-RESP: HCPCS | Performed by: INTERNAL MEDICINE

## 2021-10-21 DEVICE — IPG W1DR01 AZURE XT DR MRI USA
Type: IMPLANTABLE DEVICE | Status: FUNCTIONAL
Brand: AZURE™ XT DR MRI SURESCAN™

## 2021-10-21 DEVICE — ENVELOPE CMRM6122 ABSORB MED MR
Type: IMPLANTABLE DEVICE | Status: FUNCTIONAL
Brand: TYRX™

## 2021-10-21 RX ORDER — FENTANYL CITRATE/PF 50 MCG/ML
25 SYRINGE (ML) INJECTION
Status: CANCELLED | OUTPATIENT
Start: 2021-10-21

## 2021-10-21 RX ORDER — ONDANSETRON 2 MG/ML
4 INJECTION INTRAMUSCULAR; INTRAVENOUS ONCE AS NEEDED
Status: CANCELLED | OUTPATIENT
Start: 2021-10-21

## 2021-10-21 RX ORDER — SODIUM CHLORIDE 9 MG/ML
INJECTION, SOLUTION INTRAVENOUS CONTINUOUS PRN
Status: DISCONTINUED | OUTPATIENT
Start: 2021-10-21 | End: 2021-10-21

## 2021-10-21 RX ORDER — GENTAMICIN SULFATE 40 MG/ML
INJECTION, SOLUTION INTRAMUSCULAR; INTRAVENOUS AS NEEDED
Status: DISCONTINUED | OUTPATIENT
Start: 2021-10-21 | End: 2021-10-21 | Stop reason: HOSPADM

## 2021-10-21 RX ORDER — PROPOFOL 10 MG/ML
INJECTION, EMULSION INTRAVENOUS CONTINUOUS PRN
Status: DISCONTINUED | OUTPATIENT
Start: 2021-10-21 | End: 2021-10-21

## 2021-10-21 RX ORDER — ACETAMINOPHEN 325 MG/1
650 TABLET ORAL EVERY 4 HOURS PRN
Status: DISCONTINUED | OUTPATIENT
Start: 2021-10-21 | End: 2021-10-21 | Stop reason: HOSPADM

## 2021-10-21 RX ORDER — LIDOCAINE HYDROCHLORIDE 10 MG/ML
INJECTION, SOLUTION EPIDURAL; INFILTRATION; INTRACAUDAL; PERINEURAL AS NEEDED
Status: DISCONTINUED | OUTPATIENT
Start: 2021-10-21 | End: 2021-10-21 | Stop reason: HOSPADM

## 2021-10-21 RX ORDER — VANCOMYCIN HYDROCHLORIDE 1 G/200ML
1000 INJECTION, SOLUTION INTRAVENOUS ONCE
Status: COMPLETED | OUTPATIENT
Start: 2021-10-21 | End: 2021-10-21

## 2021-10-21 RX ORDER — METOPROLOL SUCCINATE 25 MG/1
25 TABLET, EXTENDED RELEASE ORAL DAILY
Start: 2021-10-21 | End: 2021-10-29 | Stop reason: SDUPTHER

## 2021-10-21 RX ORDER — FENTANYL CITRATE 50 UG/ML
INJECTION, SOLUTION INTRAMUSCULAR; INTRAVENOUS AS NEEDED
Status: DISCONTINUED | OUTPATIENT
Start: 2021-10-21 | End: 2021-10-21

## 2021-10-21 RX ADMIN — VANCOMYCIN HYDROCHLORIDE 1000 MG: 1 INJECTION, SOLUTION INTRAVENOUS at 06:58

## 2021-10-21 RX ADMIN — PROPOFOL 100 MCG/KG/MIN: 10 INJECTION, EMULSION INTRAVENOUS at 08:04

## 2021-10-21 RX ADMIN — FENTANYL CITRATE 25 MCG: 50 INJECTION INTRAMUSCULAR; INTRAVENOUS at 08:21

## 2021-10-21 RX ADMIN — FENTANYL CITRATE 25 MCG: 50 INJECTION INTRAMUSCULAR; INTRAVENOUS at 08:08

## 2021-10-21 RX ADMIN — SODIUM CHLORIDE: 0.9 INJECTION, SOLUTION INTRAVENOUS at 07:55

## 2021-10-21 RX ADMIN — PHENYLEPHRINE HYDROCHLORIDE 50 MCG/MIN: 10 INJECTION INTRAVENOUS at 08:09

## 2021-10-29 ENCOUNTER — OFFICE VISIT (OUTPATIENT)
Dept: CARDIOLOGY CLINIC | Facility: CLINIC | Age: 81
End: 2021-10-29
Payer: MEDICARE

## 2021-10-29 VITALS
SYSTOLIC BLOOD PRESSURE: 130 MMHG | DIASTOLIC BLOOD PRESSURE: 78 MMHG | BODY MASS INDEX: 26.71 KG/M2 | OXYGEN SATURATION: 97 % | HEART RATE: 69 BPM | HEIGHT: 70 IN | WEIGHT: 186.6 LBS

## 2021-10-29 DIAGNOSIS — Z86.79 HISTORY OF TACHYCARDIA-BRADYCARDIA SYNDROME: ICD-10-CM

## 2021-10-29 DIAGNOSIS — J94.2 HEMOTHORAX ON LEFT: ICD-10-CM

## 2021-10-29 DIAGNOSIS — I48.0 PAROXYSMAL ATRIAL FIBRILLATION (HCC): ICD-10-CM

## 2021-10-29 DIAGNOSIS — I42.0 DILATED CARDIOMYOPATHY (HCC): Primary | ICD-10-CM

## 2021-10-29 DIAGNOSIS — R07.9 CHEST PAIN, UNSPECIFIED TYPE: ICD-10-CM

## 2021-10-29 DIAGNOSIS — E78.2 MIXED HYPERLIPIDEMIA: ICD-10-CM

## 2021-10-29 DIAGNOSIS — I34.0 MITRAL VALVE INSUFFICIENCY, UNSPECIFIED ETIOLOGY: ICD-10-CM

## 2021-10-29 DIAGNOSIS — Z95.0 PRESENCE OF CARDIAC PACEMAKER: ICD-10-CM

## 2021-10-29 DIAGNOSIS — I77.810 DILATED AORTIC ROOT (HCC): ICD-10-CM

## 2021-10-29 PROCEDURE — 99214 OFFICE O/P EST MOD 30 MIN: CPT | Performed by: NURSE PRACTITIONER

## 2021-10-29 PROCEDURE — 93000 ELECTROCARDIOGRAM COMPLETE: CPT | Performed by: NURSE PRACTITIONER

## 2021-10-29 RX ORDER — METOPROLOL SUCCINATE 25 MG/1
25 TABLET, EXTENDED RELEASE ORAL DAILY
Qty: 90 TABLET | Refills: 3 | Status: SHIPPED | OUTPATIENT
Start: 2021-10-29

## 2021-10-29 RX ORDER — APIXABAN 5 MG/1
5 TABLET, FILM COATED ORAL 2 TIMES DAILY
Qty: 180 TABLET | Refills: 3 | Status: SHIPPED | OUTPATIENT
Start: 2021-10-29

## 2021-10-31 PROBLEM — Z45.010 PACEMAKER AT END OF BATTERY LIFE: Status: RESOLVED | Noted: 2021-10-21 | Resolved: 2021-10-31

## 2021-11-12 ENCOUNTER — APPOINTMENT (OUTPATIENT)
Dept: LAB | Facility: HOSPITAL | Age: 81
End: 2021-11-12
Payer: MEDICARE

## 2021-11-12 DIAGNOSIS — E53.8 VITAMIN B12 DEFICIENCY: ICD-10-CM

## 2021-11-12 DIAGNOSIS — E78.2 MIXED HYPERLIPIDEMIA: ICD-10-CM

## 2021-11-12 DIAGNOSIS — E55.9 VITAMIN D DEFICIENCY: ICD-10-CM

## 2021-11-12 LAB
25(OH)D3 SERPL-MCNC: 36.7 NG/ML (ref 30–100)
CHOLEST SERPL-MCNC: 184 MG/DL (ref 50–200)
HDLC SERPL-MCNC: 60 MG/DL
LDLC SERPL CALC-MCNC: 107 MG/DL (ref 0–100)
NONHDLC SERPL-MCNC: 124 MG/DL
TRIGL SERPL-MCNC: 87 MG/DL
VIT B12 SERPL-MCNC: 2323 PG/ML (ref 100–900)

## 2021-11-12 PROCEDURE — 82306 VITAMIN D 25 HYDROXY: CPT

## 2021-11-12 PROCEDURE — 80061 LIPID PANEL: CPT

## 2021-11-12 PROCEDURE — 82607 VITAMIN B-12: CPT

## 2021-11-12 PROCEDURE — 36415 COLL VENOUS BLD VENIPUNCTURE: CPT

## 2021-11-15 ENCOUNTER — TELEPHONE (OUTPATIENT)
Dept: CARDIOLOGY CLINIC | Facility: CLINIC | Age: 81
End: 2021-11-15

## 2021-11-17 ENCOUNTER — IN-CLINIC DEVICE VISIT (OUTPATIENT)
Dept: CARDIOLOGY CLINIC | Facility: CLINIC | Age: 81
End: 2021-11-17

## 2021-11-17 DIAGNOSIS — Z95.0 PRESENCE OF CARDIAC PACEMAKER: Primary | ICD-10-CM

## 2021-11-17 PROCEDURE — 99024 POSTOP FOLLOW-UP VISIT: CPT | Performed by: INTERNAL MEDICINE

## 2022-01-27 ENCOUNTER — OFFICE VISIT (OUTPATIENT)
Dept: CARDIOLOGY CLINIC | Facility: CLINIC | Age: 82
End: 2022-01-27
Payer: MEDICARE

## 2022-01-27 VITALS
BODY MASS INDEX: 26.34 KG/M2 | HEART RATE: 54 BPM | HEIGHT: 70 IN | WEIGHT: 184 LBS | DIASTOLIC BLOOD PRESSURE: 68 MMHG | SYSTOLIC BLOOD PRESSURE: 108 MMHG

## 2022-01-27 DIAGNOSIS — I48.0 PAROXYSMAL ATRIAL FIBRILLATION (HCC): ICD-10-CM

## 2022-01-27 DIAGNOSIS — Z86.79 HISTORY OF TACHYCARDIA-BRADYCARDIA SYNDROME: Primary | ICD-10-CM

## 2022-01-27 DIAGNOSIS — Z95.0 PRESENCE OF CARDIAC PACEMAKER: ICD-10-CM

## 2022-01-27 DIAGNOSIS — E78.2 MIXED HYPERLIPIDEMIA: ICD-10-CM

## 2022-01-27 PROCEDURE — 99214 OFFICE O/P EST MOD 30 MIN: CPT | Performed by: INTERNAL MEDICINE

## 2022-01-27 NOTE — PATIENT INSTRUCTIONS
Continue current medications without change  Will await results of next device interrogation  We likely will be able to stop Eliquis if device interrogation

## 2022-01-27 NOTE — PROGRESS NOTES
Cardiology Office Visit    Celia Beasley  [id number]  1940    [facility] [place]  [address] [place] [id number] [phone]      Dear Tosha Marks MD,    I had the pleasure of seeing your patient at our [address] [facility] [place] office today 1/27/2022  As you know he is a pleasant 78y o  year old male with a medical history as described below  Reason for office visit: Follow up sick sinus syndrome s/p PPM, hyperlipidemia, atrial fibrillation, hyperlipidemia and dilated cardiomyopathy  1  History of tachycardia-bradycardia syndrome  Assessment & Plan:  History of sick sinus syndrome resulting in pacemaker insertion in June of 2009  [facility] took over device management  Device interrogation on 10/14/2021 revealed device at [facility]  Pt underwent device generator replacement by Dr Ashley Guevara on 10/21/21  Site with approximated edges and no evidence of infection  2  Paroxysmal atrial fibrillation ([address] )  Assessment & Plan:  Pacemaker device interrogation on 9/8/2021 reveals 11 episodes of atrial fibrillation, longest episode lasting 3 hours and 58 minutes, which was a new finding  Patient was already on Eliquis 5mg BID for DVT treatment  Advised that we will continue this anticoagulation long-term for stroke prevention  Metoprolol succinate 25mg daily added with no high rate episodes noted on subsequent device interrogation, will continue to monitor with further device interrogations as pt is asymptomatic with these episodes  3  Mixed hyperlipidemia  Assessment & Plan:  Lipid panel 11/12/2021:   TG 87  HDL 60    Pt remains on Simvastatin 40mg daily  He has orders for repeat lipid panel         4  Presence of cardiac pacemaker  Assessment & Plan:  See discussion under tachy-cinda syndrome             SOL     Kiana Ramirez has a past medical history of sick sinus syndrome requiring PPM in 6/2009, dilated cardiomyopathy, fall with rib fracture and resulting hemothorax in 10/2020, and subsequent DVT on Eliquis      Patient was following with Dr Deejay Sánchez at the [address] [place] [address] Drive  He transferred to [facility] in spring 2021      I initially saw the patient 8/27/2021 for consultation  His pacemaker device management was transitioned to [address] at that time       He was seen in the office 9/16/2021 by Gaby Rodriguez after evaluation at [address] [place] [address] for left sided chest pain  Cardiac work up was unrevealing  He denied any recurrent chest pain at follow up  At that time his device interrogation revealed 4 episodes of NSVT and 11 episodes of atrial fibrillation with the longest episode lasting 3 hrs and 58 minutes  Metoprolol succinate was initiated  He was advised that his Eliquis will continue long-term for stroke prevention  He was asymptomatic with the episodes      His device interrogation on 10/14/2021 revealed his pacemaker was at [facility]  He met with Dr Fauzia Garcia for consultation on that date  He underwent pacemaker generator replacement on 10/21/2021 at [facility] by Dr Fauzia Garcia  He was last seen 10/29/2021 by Gaby Rodriguez in the office at which time he was doing well  He did note some left sided rib pain which he associated with the weather  1/27/2022: Patient presents to the office today for follow up  He does continue to have rib pain on occasion  No chest pain  No shortness of breath  No lightheadedness or dizziness  He is riding his bike 10 miles a day  Questioning the need for Eliquis  Echocardiogram is scheduled for 2/2022       Patient Active Problem List   Diagnosis    Non-seasonal allergic rhinitis    Acute respiratory failure with hypoxia (HCC)    Hemothorax on left    Closed fracture of multiple ribs of left side with routine healing    History of tachycardia-bradycardia syndrome    Bladder tumor    MR (mitral regurgitation)    Normocytic anemia    Chest pain    Bradycardia    Premature atrial contractions    Hyperlipidemia    Dilated aortic root (HCC)    Dilated cardiomyopathy (HCC)    History of venous thromboembolism    Other specified hypothyroidism    Elevated serum creatinine    Paroxysmal atrial fibrillation (HCC)    BPH (benign prostatic hyperplasia)    Gastroesophageal reflux disease    Presence of cardiac pacemaker     Past Medical History:   Diagnosis Date    Bladder tumor     Cough     Dilated cardiomyopathy (HCC)     Dyspnea     Hyperlipidemia     Left knee pain     Pacemaker     Pleural effusion     Rib fractures     Right knee pain      Social History     Socioeconomic History    Marital status: /Civil Union     Spouse name: Not on file    Number of children: Not on file    Years of education: Not on file    Highest education level: Not on file   Occupational History    Not on file   Tobacco Use    Smoking status: Never Smoker    Smokeless tobacco: Never Used   Vaping Use    Vaping Use: Never used   Substance and Sexual Activity    Alcohol use: Not Currently    Drug use: Not Currently    Sexual activity: Not Currently   Other Topics Concern    Not on file   Social History Narrative    Not on file     Social Determinants of Health     Financial Resource Strain: Not on file   Food Insecurity: Not on file   Transportation Needs: Not on file   Physical Activity: Not on file   Stress: Not on file   Social Connections: Not on file   Intimate Partner Violence: Not on file   Housing Stability: Not on file      Family History   Problem Relation Age of Onset    Lung cancer Mother     Colon cancer Father     COPD Sister      Past Surgical History:   Procedure Laterality Date    BLADDER TUMOR EXCISION      CARDIAC ELECTROPHYSIOLOGY PROCEDURE N/A 10/21/2021    Procedure: PPM generator change - dual;  Surgeon: Sunny Alex MD;  [facility];   Service: Cardiology   [facility] PACEMAKER PLACEMENT      2009, generator replacement 10/2021    HERNIA REPAIR      X2    THORACOSCOPY VIDEO ASSISTED SURGERY (VATS) Left 11/21/2020    Procedure: THORACOSCOPY VIDEO ASSISTED SURGERY (VATS), washout of hemothorax, decortication;  Surgeon: Magdy Boo MD;  Location: [facility] MAIN [facility];  Service: Thoracic    TONSILLECTOMY         Current Outpatient Medications:     Cholecalciferol 50 MCG (2000 UT) CAPS, Take by mouth, Disp: , Rfl:     Coenzyme Q10 (CO Q 10 PO), Take by mouth, Disp: , Rfl:     Cyanocobalamin (VITAMIN B 12 PO), Take by mouth, Disp: , Rfl:     Eliquis 5 MG, Take 1 tablet (5 mg total) by mouth 2 (two) times a day, Disp: 180 tablet, Rfl: 3    levothyroxine 25 mcg tablet, , Disp: , Rfl:     metoprolol succinate (TOPROL-XL) 25 mg 24 hr tablet, Take 1 tablet (25 mg total) by mouth daily, Disp: 90 tablet, Rfl: 3    simvastatin (ZOCOR) 40 mg tablet, Take 40 mg by mouth daily at bedtime , Disp: , Rfl:   Allergies   Allergen Reactions    Penicillins Anaphylaxis and Hives       Cardiac Test Results:     Lipid panel 11/12/2021: C 184  T 87  H 60  L 107  ECG 10/29/2021: Atrial paced rhythm with prolonged AV conduction with PAC  Right bundle branch block  Left posterior fascicular block  T wave abnormality  69 BPM       NM Myocardial Perfusion Spect (Exercise Induced Stress and/or Rest) 9/3/2021:  Duration of exercise was 7 min and 0 sec  Target heart rate was achieved  There was no chest pain during stress  -  Gated SPECT: The calculated left ventricular ejection fraction was 45 %  There was mild global left ventricular hypokinesis  IMPRESSIONS: No evidence of ischemia/myocardium at risk  Inferior wall perfusion abnormality noted which may represent attenuation artifact given reasonable wall thickening in this segment  Low risk perfusion findings  Mildly reduced LV function suggested on gated analysis        Echocardiogram 8/6/2021:  EF 95% Grade 1 diastolic dysfunction  Mild to moderate MR  Mild TR  Trace VT   Aortic root 3 5cm, ascending aorta 3 6cm  Compared to prior study 5/27/2020 the mitral regurgitation was previously reported to be possibly severe but appears at most moderate on this study  No other significant changes      HUSSEIN 6/17/2020:  EF 40-45%  Left atrium mildly dilated  Mild to moderate MR  Review of Systems:    Review of Systems   Constitutional: Negative for activity change, appetite change and fatigue  HENT: Negative for congestion, hearing loss, tinnitus and trouble swallowing  Eyes: Negative for visual disturbance  Respiratory: Negative for cough, chest tightness, shortness of breath and wheezing  Cardiovascular: Negative for chest pain, palpitations and leg swelling  Gastrointestinal: Negative for abdominal distention, abdominal pain, nausea and vomiting  Genitourinary: Negative for difficulty urinating  Musculoskeletal: Negative for arthralgias  Skin: Negative for rash  Neurological: Negative for dizziness, syncope and light-headedness  Hematological: Does not bruise/bleed easily  Psychiatric/Behavioral: Negative for confusion  The patient is not nervous/anxious  All other systems reviewed and are negative  Vitals:    01/27/22 0855 01/27/22 0928   BP: 142/70 108/68   Pulse: (!) 54    Weight: 83 5 kg (184 lb)    Height: 5' 10" (1 778 m)      Vitals:    01/27/22 0855   Weight: 83 5 kg (184 lb)     Height: 5' 10" (177 8 cm)     Physical Exam  Constitutional:       Appearance: He is well-developed  HENT:      Head: Normocephalic and atraumatic  Eyes:      Conjunctiva/sclera: Conjunctivae normal       Pupils: Pupils are equal, round, and reactive to light  Neck:      Vascular: No JVD  Cardiovascular:      Rate and Rhythm: Normal rate and regular rhythm  Heart sounds: Normal heart sounds  No murmur heard  No friction rub  No gallop  Pulmonary:      Effort: Pulmonary effort is normal       Breath sounds: Normal breath sounds     Abdominal:      General: Bowel sounds are normal  Palpations: Abdomen is soft  Musculoskeletal:      Cervical back: Normal range of motion  Skin:     General: Skin is warm and dry  Neurological:      Mental Status: He is alert and oriented to person, place, and time     Psychiatric:         Behavior: Behavior normal

## 2022-02-03 ENCOUNTER — TELEPHONE (OUTPATIENT)
Dept: CARDIOLOGY CLINIC | Facility: CLINIC | Age: 82
End: 2022-02-03

## 2022-02-03 NOTE — TELEPHONE ENCOUNTER
Wife calling stating that she wanted to check in with Dr Jah Waddell  States that Dr Jah Waddell said she would check into the pacer trans to see the length of his afib  If she was okay with that then to reach out to another doc to talk about stopping his eliquis because of it being over 600 dollars

## 2022-02-04 ENCOUNTER — APPOINTMENT (OUTPATIENT)
Dept: LAB | Facility: HOSPITAL | Age: 82
End: 2022-02-04
Payer: MEDICARE

## 2022-02-04 DIAGNOSIS — E78.5 HYPERLIPIDEMIA, UNSPECIFIED HYPERLIPIDEMIA TYPE: ICD-10-CM

## 2022-02-04 DIAGNOSIS — E03.9 MYXEDEMA HEART DISEASE: ICD-10-CM

## 2022-02-04 DIAGNOSIS — I51.9 MYXEDEMA HEART DISEASE: ICD-10-CM

## 2022-02-04 LAB
ALBUMIN SERPL BCP-MCNC: 3.6 G/DL (ref 3.5–5)
ALP SERPL-CCNC: 49 U/L (ref 46–116)
ALT SERPL W P-5'-P-CCNC: 26 U/L (ref 12–78)
ANION GAP SERPL CALCULATED.3IONS-SCNC: 6 MMOL/L (ref 4–13)
AST SERPL W P-5'-P-CCNC: 21 U/L (ref 5–45)
BASOPHILS # BLD AUTO: 0.08 THOUSANDS/ΜL (ref 0–0.1)
BASOPHILS NFR BLD AUTO: 1 % (ref 0–1)
BILIRUB SERPL-MCNC: 0.53 MG/DL (ref 0.2–1)
BUN SERPL-MCNC: 19 MG/DL (ref 5–25)
CALCIUM SERPL-MCNC: 8.5 MG/DL (ref 8.3–10.1)
CHLORIDE SERPL-SCNC: 104 MMOL/L (ref 100–108)
CHOLEST SERPL-MCNC: 208 MG/DL
CO2 SERPL-SCNC: 28 MMOL/L (ref 21–32)
CREAT SERPL-MCNC: 1.09 MG/DL (ref 0.6–1.3)
EOSINOPHIL # BLD AUTO: 0.18 THOUSAND/ΜL (ref 0–0.61)
EOSINOPHIL NFR BLD AUTO: 3 % (ref 0–6)
ERYTHROCYTE [DISTWIDTH] IN BLOOD BY AUTOMATED COUNT: 12.9 % (ref 11.6–15.1)
EST. AVERAGE GLUCOSE BLD GHB EST-MCNC: 126 MG/DL
GFR SERPL CREATININE-BSD FRML MDRD: 62 ML/MIN/1.73SQ M
GLUCOSE P FAST SERPL-MCNC: 112 MG/DL (ref 65–99)
HBA1C MFR BLD: 6 %
HCT VFR BLD AUTO: 44.8 % (ref 36.5–49.3)
HDLC SERPL-MCNC: 59 MG/DL
HGB BLD-MCNC: 14.8 G/DL (ref 12–17)
IMM GRANULOCYTES # BLD AUTO: 0.04 THOUSAND/UL (ref 0–0.2)
IMM GRANULOCYTES NFR BLD AUTO: 1 % (ref 0–2)
LDLC SERPL CALC-MCNC: 127 MG/DL (ref 0–100)
LYMPHOCYTES # BLD AUTO: 1.82 THOUSANDS/ΜL (ref 0.6–4.47)
LYMPHOCYTES NFR BLD AUTO: 31 % (ref 14–44)
MCH RBC QN AUTO: 30.5 PG (ref 26.8–34.3)
MCHC RBC AUTO-ENTMCNC: 33 G/DL (ref 31.4–37.4)
MCV RBC AUTO: 92 FL (ref 82–98)
MONOCYTES # BLD AUTO: 0.55 THOUSAND/ΜL (ref 0.17–1.22)
MONOCYTES NFR BLD AUTO: 9 % (ref 4–12)
NEUTROPHILS # BLD AUTO: 3.24 THOUSANDS/ΜL (ref 1.85–7.62)
NEUTS SEG NFR BLD AUTO: 55 % (ref 43–75)
NONHDLC SERPL-MCNC: 149 MG/DL
NRBC BLD AUTO-RTO: 0 /100 WBCS
PLATELET # BLD AUTO: 211 THOUSANDS/UL (ref 149–390)
PMV BLD AUTO: 9.9 FL (ref 8.9–12.7)
POTASSIUM SERPL-SCNC: 4.3 MMOL/L (ref 3.5–5.3)
PROT SERPL-MCNC: 7.1 G/DL (ref 6.4–8.2)
RBC # BLD AUTO: 4.85 MILLION/UL (ref 3.88–5.62)
SODIUM SERPL-SCNC: 138 MMOL/L (ref 136–145)
TRIGL SERPL-MCNC: 109 MG/DL
TSH SERPL DL<=0.05 MIU/L-ACNC: 4.27 UIU/ML (ref 0.36–3.74)
WBC # BLD AUTO: 5.91 THOUSAND/UL (ref 4.31–10.16)

## 2022-02-04 PROCEDURE — 80053 COMPREHEN METABOLIC PANEL: CPT

## 2022-02-04 PROCEDURE — 84443 ASSAY THYROID STIM HORMONE: CPT

## 2022-02-04 PROCEDURE — 85025 COMPLETE CBC W/AUTO DIFF WBC: CPT

## 2022-02-04 PROCEDURE — 83036 HEMOGLOBIN GLYCOSYLATED A1C: CPT

## 2022-02-04 PROCEDURE — 36415 COLL VENOUS BLD VENIPUNCTURE: CPT

## 2022-02-04 PROCEDURE — 80061 LIPID PANEL: CPT

## 2022-02-10 ENCOUNTER — TELEPHONE (OUTPATIENT)
Dept: CARDIOLOGY CLINIC | Facility: CLINIC | Age: 82
End: 2022-02-10

## 2022-02-10 NOTE — TELEPHONE ENCOUNTER
Please call the patient/wife and let her know that due to his stroke risk (3 2 % adjusted annual risk), despite short episodes of atrial fibrillation, I do recommend continuing Eliquis or another form of anticoagulation (coumadin, pradaxa or xarelto would be alternatives)  I would recommend reaching out to their insurance to see if pradaxa or xarelto would be less expensive  Coumadin would be very cheap but requires a lot of dietary restrictions and close monitoring of blood work to adjust dose  Thank you  Please have them call back after they speak to insurance company

## 2022-02-15 ENCOUNTER — HOSPITAL ENCOUNTER (OUTPATIENT)
Dept: NON INVASIVE DIAGNOSTICS | Facility: HOSPITAL | Age: 82
Discharge: HOME | End: 2022-02-15
Payer: MEDICARE

## 2022-02-15 VITALS
BODY MASS INDEX: 26.34 KG/M2 | HEIGHT: 70 IN | SYSTOLIC BLOOD PRESSURE: 108 MMHG | WEIGHT: 184 LBS | DIASTOLIC BLOOD PRESSURE: 68 MMHG | HEART RATE: 70 BPM

## 2022-02-15 DIAGNOSIS — I34.0 MITRAL VALVE INSUFFICIENCY, UNSPECIFIED ETIOLOGY: ICD-10-CM

## 2022-02-15 DIAGNOSIS — I77.810 DILATED AORTIC ROOT (HCC): ICD-10-CM

## 2022-02-15 LAB
AORTIC VALVE ANNULUS: 3.6 CM (ref 1.72–2.51)
APICAL FOUR CHAMBER EJECTION FRACTION: 50 %
ASCENDING AORTA: 3.6 CM (ref 2.05–3.07)
E WAVE DECELERATION TIME: 269 MS
FRACTIONAL SHORTENING: 33 % (ref 28–44)
INTERVENTRICULAR SEPTUM IN DIASTOLE (PARASTERNAL SHORT AXIS VIEW): 1.1 CM (ref 0.54–1.01)
INTERVENTRICULAR SEPTUM: 1.1 CM (ref 0.6–1.1)
LAAS-AP4: 15.3 CM2
LEFT ATRIUM SIZE: 3.8 CM
LEFT INTERNAL DIMENSION IN SYSTOLE: 3.6 CM (ref 2.1–4)
LEFT VENTRICULAR INTERNAL DIMENSION IN DIASTOLE: 5.4 CM (ref 4.98–7.42)
LEFT VENTRICULAR POSTERIOR WALL IN END DIASTOLE: 1 CM (ref 0.52–0.99)
LEFT VENTRICULAR STROKE VOLUME: 86 ML
LVSV (TEICH): 86 ML
MV E'TISSUE VEL-LAT: 7 CM/S
MV E'TISSUE VEL-SEP: 5 CM/S
MV PEAK A VEL: 0.92 M/S
MV PEAK E VEL: 54 CM/S
RIGHT ATRIAL 2D VOLUME: 30 ML
RIGHT ATRIUM AREA SYSTOLE A4C: 13 CM2
RIGHT VENTRICLE ID DIMENSION: 3.6 CM
SINOTUBULAR JUNCTION: 3.6 CM
SL CV LV EF: 50
SL CV PED ECHO LEFT VENTRICLE DIASTOLIC VOLUME (MOD BIPLANE) 2D: 142 ML
SL CV PED ECHO LEFT VENTRICLE SYSTOLIC VOLUME (MOD BIPLANE) 2D: 56 ML
STJ: 3.6 CM (ref 1.96–2.87)
TR MAX PG: 23 MMHG
TR PEAK VELOCITY: 2.4 M/S
TRICUSPID VALVE PEAK REGURGITATION VELOCITY: 2.38 M/S
Z-SCORE OF AORTIC VALVE ANNULUS: 7.43
Z-SCORE OF ASCENDING AORTA: 4.06 CM
Z-SCORE OF INTERVENTRICULAR SEPTUM IN END DIASTOLE: 2.75
Z-SCORE OF LEFT VENTRICULAR DIMENSION IN END SYSTOLE: -1.19
Z-SCORE OF LEFT VENTRICULAR POSTERIOR WALL IN END DIASTOLE: 2.02
Z-SCORE OF SINOTUBULAR JUNCTION: 5.19

## 2022-02-15 PROCEDURE — 93306 TTE W/DOPPLER COMPLETE: CPT

## 2022-02-15 PROCEDURE — 93306 TTE W/DOPPLER COMPLETE: CPT | Performed by: STUDENT IN AN ORGANIZED HEALTH CARE EDUCATION/TRAINING PROGRAM

## 2022-02-16 ENCOUNTER — TELEPHONE (OUTPATIENT)
Dept: CARDIOLOGY CLINIC | Facility: CLINIC | Age: 82
End: 2022-02-16

## 2022-02-16 NOTE — TELEPHONE ENCOUNTER
----- Message from Bijal Rolon 80 sent at 2/15/2022  6:00 PM EST -----  Please let pt know that his echo shows no significant change from prior study  Heart function is the same and mitral valve continues with mild- moderate stenosis  We can review in detail at upcoming appointment

## 2022-02-22 ENCOUNTER — REMOTE DEVICE CLINIC VISIT (OUTPATIENT)
Dept: CARDIOLOGY CLINIC | Facility: CLINIC | Age: 82
End: 2022-02-22
Payer: MEDICARE

## 2022-02-22 DIAGNOSIS — Z95.0 CARDIAC PACEMAKER IN SITU: Primary | ICD-10-CM

## 2022-02-22 PROCEDURE — 93296 REM INTERROG EVL PM/IDS: CPT | Performed by: INTERNAL MEDICINE

## 2022-02-22 PROCEDURE — 93294 REM INTERROG EVL PM/LDLS PM: CPT | Performed by: INTERNAL MEDICINE

## 2022-02-22 NOTE — ASSESSMENT & PLAN NOTE
Pacemaker device interrogation on 9/8/2021 reveals 11 episodes of atrial fibrillation, longest episode lasting 3 hours and 58 minutes, which was a new finding  Patient was already on Eliquis 5mg BID for DVT treatment  Advised that we will continue this anticoagulation long-term for stroke prevention  Metoprolol succinate 25mg daily added with no high rate episodes noted on subsequent device interrogation, will continue to monitor with further device interrogations as pt is asymptomatic with these episodes

## 2022-02-22 NOTE — ASSESSMENT & PLAN NOTE
Patient has a history of reduced LVEF  He had an abnormal stress test in 2009 and underwent cardiac cath at that time that revealed no obvious coronary disease  Holter monitor in 2019 revealed occasional PAC's and rare PVC's  HUSSEIN in 6/2020 with EF 40-45%  His most recent echocardiogram 8/6/2021 reveals EF of 50%  Nuclear stress test 9/3/2021 with no evidence of ischemia  Pacemaker interrogation on 9/8/21 reveals 4 episodes of NSVT and 11 episodes of atrial fibrillation, longest 3 hours 58 minutes  Metoprolol succinate 25mg daily initiated and follow up interrogation on 10/14 revealed no high rate episodes  I would like to initiate ACE/ARB, however his BP has been borderline hypotensive   Will up-titrate therapy as able with BP

## 2022-02-22 NOTE — PROGRESS NOTES
Results for orders placed or performed in visit on 02/22/22   Cardiac EP device report    Narrative    mdt dual chamber pacemaker/ ACTIVE SYSTEM IS MRI CONDITIONAL  CARELINK TRANSMISSION: BATTERY STATUS "13 YRS " AP 82%  5%  ALL AVAILABLE LEAD PARAMETERS WITHIN NORMAL LIMITS  2 NSVT NOTED; PT ON METO SUCC & EF 50% (2022)  NORMAL DEVICE FUNCTION   NC         Current Outpatient Medications:     Cholecalciferol 50 MCG (2000 UT) CAPS, Take by mouth, Disp: , Rfl:     Coenzyme Q10 (CO Q 10 PO), Take by mouth, Disp: , Rfl:     Cyanocobalamin (VITAMIN B 12 PO), Take by mouth, Disp: , Rfl:     Eliquis 5 MG, Take 1 tablet (5 mg total) by mouth 2 (two) times a day, Disp: 180 tablet, Rfl: 3    levothyroxine 25 mcg tablet, , Disp: , Rfl:     metoprolol succinate (TOPROL-XL) 25 mg 24 hr tablet, Take 1 tablet (25 mg total) by mouth daily, Disp: 90 tablet, Rfl: 3    simvastatin (ZOCOR) 40 mg tablet, Take 40 mg by mouth daily at bedtime , Disp: , Rfl:

## 2022-02-22 NOTE — ASSESSMENT & PLAN NOTE
Lipid panel 11/12/2021:   TG 87  HDL 60    Pt remains on Simvastatin 40mg daily  He has orders for repeat lipid panel

## 2022-03-24 PROBLEM — J94.2 HEMOTHORAX ON LEFT: Status: RESOLVED | Noted: 2020-11-18 | Resolved: 2022-03-24

## 2022-03-24 PROBLEM — R00.1 BRADYCARDIA: Status: RESOLVED | Noted: 2021-08-27 | Resolved: 2022-03-24

## 2022-03-24 PROCEDURE — 93000 ELECTROCARDIOGRAM COMPLETE: CPT | Performed by: INTERNAL MEDICINE

## 2022-03-24 NOTE — ASSESSMENT & PLAN NOTE
Patient with long standing history of PAC's  Holter monitor 10/2019 showed occasional PAC's (2% burden)  Beta blocker may be helpful in suppressing PAC's but patient has always had borderline blood pressures

## 2022-03-24 NOTE — ASSESSMENT & PLAN NOTE
History of sick sinus syndrome resulting in pacemaker placement 06/09/2009  Patient is scheduled for device check with [facility] 09/27/2021 but would like to transition device management over to [address] which I will arrange  It does appear that he may be close to needing a generator change

## 2022-03-24 NOTE — ASSESSMENT & PLAN NOTE
Aortic root has measured 3 8-4cm on prior imaging  Echocardiogram in 8/2021 with aortic root measurement of 3 5 cm  Will continue to follow with future echocardiograms  Patient should have dedicated aortic imaging at some point in the future

## 2022-03-24 NOTE — ASSESSMENT & PLAN NOTE
Lipid panel 11/20/2020: C 140  T 74  H 75  L 50  Continue simvastatin 40 mg daily  Should have repeat lipid panel 11/2021

## 2022-03-24 NOTE — ASSESSMENT & PLAN NOTE
Mitral regurgitation noted over the last several years on echocardiogram   Echocardiogram 8/2019 suggested moderate and possibly severe mitral regurgitation  Repeat echocardiogram 5/27/2020 showed possibly severe mitral regurgitation  HUSSEIN 06/17/2020 showed ejection fraction of 40-45% with mild to moderate mitral regurgitation present  Echocardiogram 08/06/2021 showed mild-to-moderate mitral regurgitation  Will plan six-month follow-up
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
## 2022-05-24 ENCOUNTER — REMOTE DEVICE CLINIC VISIT (OUTPATIENT)
Dept: CARDIOLOGY CLINIC | Facility: CLINIC | Age: 82
End: 2022-05-24
Payer: MEDICARE

## 2022-05-24 DIAGNOSIS — Z95.0 PRESENCE OF PERMANENT CARDIAC PACEMAKER: Primary | ICD-10-CM

## 2022-05-24 PROCEDURE — 93296 REM INTERROG EVL PM/IDS: CPT | Performed by: INTERNAL MEDICINE

## 2022-05-24 PROCEDURE — 93294 REM INTERROG EVL PM/LDLS PM: CPT | Performed by: INTERNAL MEDICINE

## 2022-05-24 NOTE — PROGRESS NOTES
MDT DUAL CHAMBER PM/ ACTIVE SYSTEM IS MRI CONDITIONAL   CARELINK TRANSMISSION:  BATTERY VOLTAGE ADEQUATE (13 2 YR )   AP 87 5%  8 8%    ALL LEAD PARAMETERS WITHIN NORMAL LIMITS   2 EPISODES OF NSVT (7 @ 154 BPM, 7 @ 164 BPM)   EF 50% (ECHO 02/2022)   PT TAKES ELIQUIS AND METOPROLOL   TASK TO DR ELDER   NORMAL DEVICE FUNCTION   RG

## 2022-08-23 ENCOUNTER — REMOTE DEVICE CLINIC VISIT (OUTPATIENT)
Dept: CARDIOLOGY CLINIC | Facility: CLINIC | Age: 82
End: 2022-08-23
Payer: MEDICARE

## 2022-08-23 DIAGNOSIS — Z95.0 PRESENCE OF PERMANENT CARDIAC PACEMAKER: Primary | ICD-10-CM

## 2022-08-23 PROCEDURE — 93294 REM INTERROG EVL PM/LDLS PM: CPT | Performed by: INTERNAL MEDICINE

## 2022-08-23 PROCEDURE — 93296 REM INTERROG EVL PM/IDS: CPT | Performed by: INTERNAL MEDICINE

## 2022-08-23 NOTE — PROGRESS NOTES
Results for orders placed or performed in visit on 08/23/22   Cardiac EP device report    Narrative    mdt dual chamber pacemaker/ [id number] [facility] [place] [place]  (13 1 YRS)  AP 89%   4%  ALL AVAILABLE LEAD PARAMETERS WITHIN NORMAL LIMITS  2 VHR EPISODE DETECTED 8 BEATS @ 360ms  PATIENT IS ON METOPROLOL SUCC AND ELIQUIS; EF 50% (2022)  NORMAL DEVICE FUNCTION  ---KHALIL

## 2022-08-26 ENCOUNTER — OFFICE VISIT (OUTPATIENT)
Dept: CARDIOLOGY CLINIC | Facility: CLINIC | Age: 82
End: 2022-08-26
Payer: MEDICARE

## 2022-08-26 VITALS
OXYGEN SATURATION: 96 % | SYSTOLIC BLOOD PRESSURE: 128 MMHG | BODY MASS INDEX: 26.05 KG/M2 | DIASTOLIC BLOOD PRESSURE: 78 MMHG | HEIGHT: 70 IN | WEIGHT: 182 LBS | HEART RATE: 60 BPM

## 2022-08-26 DIAGNOSIS — Z86.79 HISTORY OF TACHYCARDIA-BRADYCARDIA SYNDROME: Primary | ICD-10-CM

## 2022-08-26 DIAGNOSIS — I48.0 PAROXYSMAL ATRIAL FIBRILLATION (HCC): ICD-10-CM

## 2022-08-26 DIAGNOSIS — I34.0 MITRAL VALVE INSUFFICIENCY, UNSPECIFIED ETIOLOGY: ICD-10-CM

## 2022-08-26 DIAGNOSIS — I49.1 PREMATURE ATRIAL CONTRACTIONS: ICD-10-CM

## 2022-08-26 DIAGNOSIS — I77.810 DILATED AORTIC ROOT (HCC): ICD-10-CM

## 2022-08-26 DIAGNOSIS — I42.0 DILATED CARDIOMYOPATHY (HCC): ICD-10-CM

## 2022-08-26 DIAGNOSIS — E78.2 MIXED HYPERLIPIDEMIA: ICD-10-CM

## 2022-08-26 DIAGNOSIS — Z95.0 PRESENCE OF CARDIAC PACEMAKER: ICD-10-CM

## 2022-08-26 PROCEDURE — 99214 OFFICE O/P EST MOD 30 MIN: CPT | Performed by: INTERNAL MEDICINE

## 2022-08-26 PROCEDURE — 1124F ACP DISCUSS-NO DSCNMKR DOCD: CPT | Performed by: INTERNAL MEDICINE

## 2022-08-26 NOTE — PROGRESS NOTES
Cardiology Office Visit    Rozella Bloch  [id number]  1940    [facility] [address] [id number] [phone]      Dear Geovanna Hernandez MD,    I had the pleasure of seeing your patient at our [facility] [place] office today 8/26/2022  As you know he is a pleasant 78y o  year old male with a medical history as described below  Reason for office visit: Follow up sick sinus syndrome s/p PPM, hyperlipidemia, atrial fibrillation, hyperlipidemia and dilated cardiomyopathy  1  History of tachycardia-bradycardia syndrome    2  Presence of cardiac pacemaker    3  Dilated cardiomyopathy ([facility] )  -     Echo complete w/ contrast if indicated; Future; Expected date: 02/06/2023    4  Mixed hyperlipidemia    5  Paroxysmal atrial fibrillation (HCC)  -     Echo complete w/ contrast if indicated; Future; Expected date: 02/06/2023    6  Mitral valve insufficiency, unspecified etiology  -     Echo complete w/ contrast if indicated; Future; Expected date: 02/06/2023    7  Dilated aortic root ([address] )    8  Premature atrial contractions       Up to date with testing  Will need repeat echocardiogram 2/2023  Call me with any change in symptoms  HPI     [facility] has a past medical history of sick sinus syndrome requiring PPM in 6/2009, dilated cardiomyopathy, fall with rib fracture and resulting hemothorax in 10/2020, and subsequent DVT on Eliquis      Patient was following with Dr Miladis Garay at the [address] [place] [address] Drive  He transferred to [facility] in spring 2021      I initially saw the patient 8/27/2021 for consultation  His pacemaker device management was transitioned to [facility] at that time       He was seen in the office 9/16/2021 by [place] after evaluation at [address] [place] [address] for left sided chest pain  Cardiac work up was unrevealing  He denied any recurrent chest pain at follow up   At that time his device interrogation revealed 4 episodes of NSVT and 11 episodes of atrial fibrillation with the longest episode lasting 3 hrs and 58 minutes  Metoprolol succinate was initiated  He was advised that his Eliquis will continue long-term for stroke prevention  He was asymptomatic with the episodes      His device interrogation on 10/14/2021 revealed his pacemaker was at [facility]  He met with Dr Emy Junior for consultation on that date  He underwent pacemaker generator replacement on 10/21/2021 at [facility] by Dr Emy Junior  He was last seen 10/29/2021 by Gaby Rodriguez in the office at which time he was doing well  He did note some left sided rib pain which he associated with the weather  1/27/2022: Patient presents to the office today for follow up  He does continue to have rib pain on occasion  No chest pain  No shortness of breath  No lightheadedness or dizziness  He is riding his bike 10 miles a day  Questioning the need for Eliquis  Echocardiogram is scheduled for 2/2022 8/26/2022: Patient presents to the office today for follow up  He continues to ride his bike  No chest pain  No shortness of breath  No palpitations       Patient Active Problem List   Diagnosis    Non-seasonal allergic rhinitis    Acute respiratory failure with hypoxia (HCC)    Closed fracture of multiple ribs of left side with routine healing    History of tachycardia-bradycardia syndrome    Bladder tumor    MR (mitral regurgitation)    Normocytic anemia    Chest pain    Premature atrial contractions    Hyperlipidemia    Dilated aortic root (HCC)    Dilated cardiomyopathy (HCC)    History of venous thromboembolism    Other specified hypothyroidism    Elevated serum creatinine    Paroxysmal atrial fibrillation (HCC)    BPH (benign prostatic hyperplasia)    Gastroesophageal reflux disease    Presence of cardiac pacemaker     Past Medical History:   Diagnosis Date    Bladder cancer ([facility] )     [facility]     Dilated cardiomyopathy [facility] )  D[facility] thrombosis) [facility]     GERD (gastroesophageal reflux disease)     Hyperlipidemia     Mitral regurgitation     Pacemaker     Pleural effusion     Rib fractures     SSS (sick sinus syndrome) ([facility])      Social History     Socioeconomic History    Marital status: /Civil Union     Spouse name: Not on file    Number of children: Not on file    Years of education: Not on file    Highest education level: Not on file   Occupational History    Not on file   Tobacco Use    Smoking status: Never Smoker    Smokeless tobacco: Never Used   Vaping Use    Vaping Use: Never used   Substance and Sexual Activity    Alcohol use: Not Currently    Drug use: Not Currently    Sexual activity: Not Currently   Other Topics Concern    Not on file   Social History Narrative    Not on file     Social Determinants of Health     Financial Resource Strain: Not on file   Food Insecurity: Not on file   Transportation Needs: Not on file   Physical Activity: Not on file   Stress: Not on file   Social Connections: Not on file   Intimate Partner Violence: Not on file   Housing Stability: Not on file      Family History   Problem Relation Age of Onset    Lung cancer Mother     Colon cancer Father     COPD Sister      Past Surgical History:   Procedure Laterality Date    BLADDER TUMOR EXCISION      CARDIAC ELECTROPHYSIOLOGY PROCEDURE N/A 10/21/2021    PPM generator change - dual  Surgeon: Mark Chen MD    CARDIAC PACEMAKER PLACEMENT  06/09/2009 2009, generator replacement 10/2021    HERNIA REPAIR      X2    THORACOSCOPY VIDEO ASSISTED SURGERY (VATS) Left 11/21/2020    THORACOSCOPY VIDEO ASSISTED SURGERY (VATS), washout of hemothorax, decortication   Surgeon: Eliazar Garcia MD;  [facility];  Service: Thoracic    TONSILLECTOMY         Current Outpatient Medications:     Cholecalciferol 50 MCG (2000 UT) CAPS, Take by mouth, Disp: , Rfl:     Coenzyme Q10 (CO Q 10 PO), Take by mouth, Disp: , Rfl:     Cyanocobalamin (VITAMIN B 12 PO), Take by mouth, Disp: , Rfl:     Eliquis 5 MG, Take 1 tablet (5 mg total) by mouth 2 (two) times a day, Disp: 180 tablet, Rfl: 3    levothyroxine 25 mcg tablet, , Disp: , Rfl:     metoprolol succinate (TOPROL-XL) 25 mg 24 hr tablet, Take 1 tablet (25 mg total) by mouth daily, Disp: 90 tablet, Rfl: 3    simvastatin (ZOCOR) 40 mg tablet, Take 40 mg by mouth daily at bedtime , Disp: , Rfl:   Allergies   Allergen Reactions    Penicillins Anaphylaxis and Hives       Cardiac Test Results:     Lipid panel 11/12/2021: C 184  T 87  H 60  L 107  ECG 10/29/2021: Atrial paced rhythm with prolonged AV conduction with PAC  Right bundle branch block  Left posterior fascicular block  T wave abnormality  69 BPM       NM Myocardial Perfusion Spect (Exercise Induced Stress and/or Rest) 9/3/2021:  Duration of exercise was 7 min and 0 sec  Target heart rate was achieved  There was no chest pain during stress  -  Gated SPECT: The calculated left ventricular ejection fraction was 45 %  There was mild global left ventricular hypokinesis  IMPRESSIONS: No evidence of ischemia/myocardium at risk  Inferior wall perfusion abnormality noted which may represent attenuation artifact given reasonable wall thickening in this segment  Low risk perfusion findings  Mildly reduced LV function suggested on gated analysis        Echocardiogram 8/6/2021:  EF 64% Grade 1 diastolic dysfunction  Mild to moderate MR  Mild TR  Trace GA  Aortic root 3 5cm, ascending aorta 3 6cm  Compared to prior study 5/27/2020 the mitral regurgitation was previously reported to be possibly severe but appears at most moderate on this study  No other significant changes      HUSSEIN 6/17/2020:  EF 40-45%  Left atrium mildly dilated  Mild to moderate MR  Review of Systems:    Review of Systems   Constitutional: Negative for activity change, appetite change and fatigue     HENT: Negative for congestion, hearing loss, tinnitus and trouble swallowing  Eyes: Negative for visual disturbance  Respiratory: Negative for cough, chest tightness, shortness of breath and wheezing  Cardiovascular: Negative for chest pain, palpitations and leg swelling  Gastrointestinal: Negative for abdominal distention, abdominal pain, nausea and vomiting  Genitourinary: Negative for difficulty urinating  Musculoskeletal: Negative for arthralgias  Skin: Negative for rash  Neurological: Negative for dizziness, syncope and light-headedness  Hematological: Does not bruise/bleed easily  Psychiatric/Behavioral: Negative for confusion  The patient is not nervous/anxious  All other systems reviewed and are negative  Vitals:    08/26/22 0939   BP: 128/78   Pulse: 60   SpO2: 96%   Weight: 82 6 kg (182 lb)   Height: 5' 10" (1 778 m)     Vitals:    08/26/22 0939   Weight: 82 6 kg (182 lb)     Height: 5' 10" (177 8 cm)     Physical Exam  Constitutional:       Appearance: He is well-developed  HENT:      Head: Normocephalic and atraumatic  Eyes:      Conjunctiva/sclera: Conjunctivae normal       Pupils: Pupils are equal, round, and reactive to light  Neck:      Vascular: No JVD  Cardiovascular:      Rate and Rhythm: Normal rate and regular rhythm  Occasional extrasystoles are present  Heart sounds: Normal heart sounds  No murmur heard  No friction rub  No gallop  Pulmonary:      Effort: Pulmonary effort is normal       Breath sounds: Normal breath sounds  Abdominal:      General: Bowel sounds are normal       Palpations: Abdomen is soft  Musculoskeletal:      Cervical back: Normal range of motion  Skin:     General: Skin is warm and dry  Neurological:      Mental Status: He is alert and oriented to person, place, and time     Psychiatric:         Behavior: Behavior normal

## 2022-08-26 NOTE — PATIENT INSTRUCTIONS
Up to date with testing  Will need repeat echocardiogram 2/2023  Call me with any change in symptoms

## 2022-11-08 ENCOUNTER — HOSPITAL ENCOUNTER (EMERGENCY)
Facility: HOSPITAL | Age: 82
Discharge: HOME | End: 2022-11-08
Attending: EMERGENCY MEDICINE

## 2022-11-08 ENCOUNTER — APPOINTMENT (EMERGENCY)
Dept: RADIOLOGY | Facility: HOSPITAL | Age: 82
End: 2022-11-08

## 2022-11-08 VITALS
HEART RATE: 66 BPM | BODY MASS INDEX: 26.05 KG/M2 | TEMPERATURE: 98.2 F | SYSTOLIC BLOOD PRESSURE: 134 MMHG | RESPIRATION RATE: 17 BRPM | HEIGHT: 70 IN | WEIGHT: 182 LBS | OXYGEN SATURATION: 97 % | DIASTOLIC BLOOD PRESSURE: 70 MMHG

## 2022-11-08 DIAGNOSIS — B34.9 VIRAL SYNDROME: Primary | ICD-10-CM

## 2022-11-08 DIAGNOSIS — J21.0 RSV (ACUTE BRONCHIOLITIS DUE TO RESPIRATORY SYNCYTIAL VIRUS): ICD-10-CM

## 2022-11-08 LAB
FLUAV RNA RESP QL NAA+PROBE: NEGATIVE
FLUBV RNA RESP QL NAA+PROBE: NEGATIVE
RSV RNA RESP QL NAA+PROBE: POSITIVE
SARS-COV-2 RNA RESP QL NAA+PROBE: NEGATIVE

## 2022-11-08 NOTE — ED PROVIDER NOTES
History  Chief Complaint   Patient presents with   • Flu Symptoms     Cough, body aches, and fatigue since Friday     Three days of flu-like symptoms with cough fatigue, headaches, body aches, diarrhea, congestion  No fevers or chills  No vomiting  History provided by:  Patient   used: No    Flu Symptoms  Presenting symptoms: cough, diarrhea, fatigue, headache, myalgias and rhinorrhea    Presenting symptoms: no fever, no nausea, no shortness of breath, no sore throat and no vomiting    Severity:  Moderate  Onset quality:  Gradual  Duration:  3 days  Progression:  Unchanged  Chronicity:  New  Relieved by:  Nothing  Worsened by:  Nothing  Ineffective treatments:  None tried  Associated symptoms: nasal congestion    Associated symptoms: no chills, no ear pain, no neck stiffness and no syncope    Risk factors: being elderly        Prior to Admission Medications   Prescriptions Last Dose Informant Patient Reported? Taking?    Cholecalciferol 50 MCG (2000 UT) CAPS  Self Yes No   Sig: Take by mouth   Coenzyme Q10 (CO Q 10 PO)  Self Yes No   Sig: Take by mouth   Cyanocobalamin (VITAMIN B 12 PO)  Self Yes No   Sig: Take by mouth   Eliquis 5 MG   No No   Sig: Take 1 tablet (5 mg total) by mouth 2 (two) times a day   levothyroxine 25 mcg tablet  Self Yes No   metoprolol succinate (TOPROL-XL) 25 mg 24 hr tablet   No No   Sig: Take 1 tablet (25 mg total) by mouth daily   simvastatin (ZOCOR) 40 mg tablet  Self Yes No   Sig: Take 40 mg by mouth daily at bedtime       Facility-Administered Medications: None       Past Medical History:   Diagnosis Date   • Bladder cancer (HCC)    • Dilated aortic root (HCC)    • Dilated cardiomyopathy (HCC)    • DVT (deep venous thrombosis) ([facility])    • GERD (gastroesophageal reflux disease)    • Hyperlipidemia    • Mitral regurgitation    • Pacemaker    • Pleural effusion    • Rib fractures    • [facility] syndrome) ([facility] )        Past Surgical History:   Procedure Laterality Date   • BLADDER TUMOR EXCISION     • CARDIAC ELECTROPHYSIOLOGY PROCEDURE N/A 10/21/2021    PPM generator change - dual  Surgeon: Talib Manzo MD   • CARDIAC PACEMAKER PLACEMENT  06/09/2009 2009, generator replacement 10/2021   • HERNIA REPAIR      X2   • THORACOSCOPY VIDEO ASSISTED SURGERY (VATS) Left 11/21/2020    THORACOSCOPY VIDEO ASSISTED SURGERY (VATS), washout of hemothorax, decortication  Surgeon: Arvin Muñoz MD;  [facility];  Service: Thoracic   • TONSILLECTOMY         Family History   Problem Relation Age of Onset   • Lung cancer Mother    • Colon cancer Father    • COPD Sister      I have reviewed and agree with the history as documented  E-Cigarette/Vaping   • E-Cigarette Use Never User      E-Cigarette/Vaping Substances   • Nicotine No    • THC No    • CBD No    • Flavoring No      Social History     Tobacco Use   • Smoking status: Never Smoker   • Smokeless tobacco: Never Used   Vaping Use   • Vaping Use: Never used   Substance Use Topics   • Alcohol use: Not Currently   • Drug use: Not Currently       Review of Systems   Constitutional: Positive for fatigue  Negative for chills and fever  HENT: Positive for congestion and rhinorrhea  Negative for ear pain, hearing loss, sore throat, trouble swallowing and voice change  Eyes: Negative for pain and discharge  Respiratory: Positive for cough  Negative for shortness of breath and wheezing  Cardiovascular: Negative for chest pain and palpitations  Gastrointestinal: Positive for diarrhea  Negative for abdominal pain, blood in stool, constipation, nausea and vomiting  Genitourinary: Negative for dysuria, flank pain, frequency and hematuria  Musculoskeletal: Positive for myalgias  Negative for joint swelling, neck pain and neck stiffness  Skin: Negative for rash and wound  Neurological: Positive for headaches  Negative for dizziness, seizures, syncope and facial asymmetry     Psychiatric/Behavioral: Negative for hallucinations, self-injury and suicidal ideas  All other systems reviewed and are negative  Physical Exam  Physical Exam  Vitals and nursing note reviewed  Constitutional:       General: He is not in acute distress  Appearance: He is well-developed  HENT:      Head: Normocephalic and atraumatic  Right Ear: External ear normal       Left Ear: External ear normal    Eyes:      General: No scleral icterus  Right eye: No discharge  Left eye: No discharge  Extraocular Movements: Extraocular movements intact  Conjunctiva/sclera: Conjunctivae normal    Cardiovascular:      Rate and Rhythm: Normal rate and regular rhythm  Heart sounds: Normal heart sounds  No murmur heard  Pulmonary:      Effort: Pulmonary effort is normal  No respiratory distress  Breath sounds: Normal breath sounds  No stridor  No wheezing, rhonchi or rales  Abdominal:      General: Bowel sounds are normal  There is no distension  Palpations: Abdomen is soft  Tenderness: There is no abdominal tenderness  There is no guarding or rebound  Musculoskeletal:         General: No deformity  Normal range of motion  Cervical back: Normal range of motion and neck supple  Skin:     General: Skin is warm and dry  Findings: No rash  Neurological:      General: No focal deficit present  Mental Status: He is alert and oriented to person, place, and time  Cranial Nerves: No cranial nerve deficit  Psychiatric:         Mood and Affect: Mood normal          Behavior: Behavior normal          Thought Content:  Thought content normal          Judgment: Judgment normal          Vital Signs  ED Triage Vitals [11/08/22 0603]   Temperature Pulse Respirations Blood Pressure SpO2   98 2 °F (36 8 °C) 66 17 134/70 97 %      Temp Source Heart Rate Source Patient Position - Orthostatic VS BP Location FiO2 (%)   Temporal Monitor Sitting Right arm --      Pain Score       -- Vitals:    11/08/22 0603   BP: 134/70   Pulse: 66   Patient Position - Orthostatic VS: Sitting         Visual Acuity      ED Medications  Medications - No data to display    Diagnostic Studies  Results Reviewed     Procedure Component Value Units Date/Time    COVID19, Influenza A/B, RSV PCR, [facility] [[id number]]  (Abnormal) Collected: 11/08/22 0609    Lab Status: Final result Specimen: Nares from Nose Updated: 11/08/22 0654     SARS-CoV-2 Negative     INFLUENZA A PCR Negative     INFLUENZA B PCR Negative     RSV PCR Positive    Narrative:      FOR PEDIATRIC PATIENTS - copy/paste COVID Guidelines URL to browser: [email or web address]  [employer]    SARS-CoV-2 assay is a Nucleic Acid Amplification assay intended for the  qualitative detection of nucleic acid from SARS-CoV-2 in nasopharyngeal  swabs  Results are for the presumptive identification of SARS-CoV-2 RNA  Positive results are indicative of infection with SARS-CoV-2, the virus  causing COVID-19, but do not rule out bacterial infection or co-infection  with other viruses  Laboratories within the [place] and its  territories are required to report all positive results to the appropriate  public health authorities  Negative results do not preclude SARS-CoV-2  infection and should not be used as the sole basis for treatment or other  patient management decisions  Negative results must be combined with  clinical observations, patient history, and epidemiological information  This test has not been FDA cleared or approved  This test has been authorized by FDA under an Emergency Use Authorization  (EUA)  This test is only authorized for the duration of time the  declaration that circumstances exist justifying the authorization of the  emergency use of an in vitro diagnostic tests for detection of SARS-CoV-2  virus and/or diagnosis of COVID-19 infection under section 564(b)(1) of  the Act, 21 U  S C  360bbb-3(b)(1), unless the authorization is terminated  or revoked sooner  The test has been validated but independent review by FDA  and CLIA is pending  Test performed using [employer] GeneXpert: This RT-PCR assay targets N2,  a region unique to SARS-CoV-2  A conserved region in the E-gene was chosen  for pan-Sarbecovirus detection which includes SARS-CoV-2  According to CMS-2020-01-R, this platform meets the definition of high-throughput technology  XR chest portable   ED Interpretation by Israel Salazar MD (11/08 4316)   No acute finding                 Procedures  Procedures         ED Course  ED Course as of 11/08/22 0701   Tue Nov 08, 2022   8630 Chest x-ray negative  COVID test pending     0700 RSV testing positive                               SBIRT 22yo+    Flowsheet Row Most Recent Value   SBIRT (23 yo +)    In order to provide better care to our patients, we are screening all of our patients for alcohol and drug use  Would it be okay to ask you these screening questions? Yes Filed at: 11/08/2022 0646   Initial Alcohol Screen: US AUDIT-C     1  How often do you have a drink containing alcohol? 0 Filed at: 11/08/2022 0621   2  How many drinks containing alcohol do you have on a typical day you are drinking? 0 Filed at: 11/08/2022 0621   3b  FEMALE Any Age, or MALE 65+: How often do you have 4 or more drinks on one occassion? 0 Filed at: 11/08/2022 6795   Audit-C Score 0 Filed at: 11/08/2022 3165   DEUCE: How many times in the past year have you    Used an illegal drug or used a prescription medication for non-medical reasons?  Never Filed at: 11/08/2022 4360                    MDM  Number of Diagnoses or Management Options     Amount and/or Complexity of Data Reviewed  Clinical lab tests: ordered and reviewed  Tests in the radiology section of CPT®: reviewed and ordered  Decide to obtain previous medical records or to obtain history from someone other than the patient: yes  Review and summarize past medical records: yes  Independent visualization of images, tracings, or specimens: yes        Disposition  Final diagnoses:   Viral syndrome   RSV (acute bronchiolitis due to respiratory syncytial virus)     Time reflects when diagnosis was documented in both MDM as applicable and the Disposition within this note     Time User Action Codes Description Comment    11/8/2022  6:23 AM Daune Smoker Add [B34 9] Viral syndrome     11/8/2022  7:01 AM Zaire Wilson Add [J21 0] RSV (acute bronchiolitis due to respiratory syncytial virus)       ED Disposition     ED Disposition   Discharge    Condition   Stable    Date/Time   Tue Nov 8, 2022  6:57 AM    Comment   Zo Bach discharge to home/self care  Follow-up Information     Follow up With Specialties Details Why [address] Emerita Rodriguez MD Family Medicine In 2 days  [address]  [address] [facility]  [phone]            Patient's Medications   Discharge Prescriptions    No medications on file       No discharge procedures on file      PDMP Review     None          ED Provider  Electronically Signed by           Kelvin Méndez MD  11/08/22 9507

## 2022-11-10 ENCOUNTER — HOSPITAL ENCOUNTER (OUTPATIENT)
Dept: RADIOLOGY | Facility: HOSPITAL | Age: 82
End: 2022-11-10

## 2022-11-10 DIAGNOSIS — R05.9 COUGH, UNSPECIFIED TYPE: ICD-10-CM

## 2023-01-10 ENCOUNTER — IN-CLINIC DEVICE VISIT (OUTPATIENT)
Dept: CARDIOLOGY CLINIC | Facility: CLINIC | Age: 83
End: 2023-01-10

## 2023-01-10 DIAGNOSIS — Z95.0 PRESENCE OF CARDIAC PACEMAKER: Primary | ICD-10-CM

## 2023-01-10 NOTE — PROGRESS NOTES
Results for orders placed or performed in visit on 01/10/23   Cardiac EP device report    Narrative    mdt dual chamber pacemaker/ ACTIVE SYSTEM IS MRI CONDITIONAL  DEVICE INTERROGATED IN THE [place] OFFICE: BATTERY VOLTAGE ADEQUATE (12 4 YRS)  AP: 87 2%  : 7 6% (MVP-ON)  ALL LEAD PARAMETERS WITHIN NORMAL LIMITS  24 VT EPISODES W/ AVAIL EGMS SHOWING NSVT 8 BEATS  @182 BPM, 7 BEATS @ 154 BPM, 10 BEATS @ 207 BPM, 8 BEATS  @158 BPM & 10 BEATS  @156 BPM  1 FAST A&V EPISODE W/ EGM SHOWING SVT-ST @ 176 BPM DURATION 15 SECS  PT TAKES ELIQUIS, METOPROLOL SUCC  EF: 50% (ECHO 2/15/22)  NO PROGRAMMING CHANGES MADE TO DEVICE PARAMETERS  TASK TO DR ELDER  PACEMAKER FUNCTIONING APPROPRIATELY    [address] [place] [address]

## 2023-01-11 ENCOUNTER — TELEPHONE (OUTPATIENT)
Dept: CARDIOLOGY CLINIC | Facility: CLINIC | Age: 83
End: 2023-01-11

## 2023-01-11 DIAGNOSIS — I48.0 PAROXYSMAL ATRIAL FIBRILLATION (HCC): ICD-10-CM

## 2023-01-12 NOTE — TELEPHONE ENCOUNTER
----- Message from [facility] sent at 1/10/2023  1:52 PM EST -----  Regarding: nsvt on pm  Hi! Pt had 24 VT episodes w/ avail egms showing nsvt 8 beats @ 182 bpm, 7 beats @ 154 bpm, 10 beats @ 207 bpm, 8 beats @ 158 bpm, 10 beats  @156 bpm  1 fast a&v episode w/ egm showing svt-st @ 176 bpm, duration 15 secs  Pt takes elliquis, metoprolol succ  EF: 50% (echo 2/15/22)  Thanks,  ~Kelly   DEVICE INTERROGATED IN THE [place] OFFICE: BATTERY VOLTAGE ADEQUATE (12 4 YRS)  AP: 87 2%  : 7 6% (MVP-ON)  ALL LEAD PARAMETERS WITHIN NORMAL LIMITS  24 VT EPISODES W/ AVAIL EGMS SHOWING NSVT 8 BEATS  @182 BPM, 7 BEATS @ 154 BPM, 10 BEATS @ 207 BPM, 8 BEATS  @158 BPM & 10 BEATS  @156 BPM  1 FAST A&V EPISODE W/ EGM SHOWING SVT-ST @ 176 BPM DURATION 15 SECS  PT TAKES ELIQUIS, METOPROLOL SUCC  EF: 50% (ECHO 2/15/22)  NO PROGRAMMING CHANGES MADE TO DEVICE PARAMETERS  TASK TO DR ELDER  PACEMAKER FUNCTIONING APPROPRIATELY    [address]

## 2023-01-13 RX ORDER — METOPROLOL SUCCINATE 50 MG/1
50 TABLET, EXTENDED RELEASE ORAL DAILY
Qty: 90 TABLET | Refills: 3 | Status: SHIPPED | OUTPATIENT
Start: 2023-01-13

## 2023-01-13 NOTE — TELEPHONE ENCOUNTER
Spoke with patient and reviewed interrogation results  He is agreeable to increasing the metoprolol succinate to 50 mg daily  I sent to [facility] and instructed him to take 2 of his 25 mg pills together to use up  He will contact us with any concerns  He is scheduled for his echocardiogram 2/9/23

## 2023-01-27 ENCOUNTER — APPOINTMENT (OUTPATIENT)
Dept: LAB | Facility: HOSPITAL | Age: 83
End: 2023-01-27

## 2023-01-27 DIAGNOSIS — N40.1 ENLARGED PROSTATE WITH URINARY OBSTRUCTION: ICD-10-CM

## 2023-01-27 DIAGNOSIS — N13.8 ENLARGED PROSTATE WITH URINARY OBSTRUCTION: ICD-10-CM

## 2023-01-30 LAB
PSA FREE MFR SERPL: 26 %
PSA FREE SERPL-MCNC: 0.39 NG/ML
PSA SERPL-MCNC: 1.5 NG/ML (ref 0–4)

## 2023-02-06 ENCOUNTER — HOSPITAL ENCOUNTER (OUTPATIENT)
Dept: NON INVASIVE DIAGNOSTICS | Facility: HOSPITAL | Age: 83
Discharge: HOME | End: 2023-02-06
Attending: INTERNAL MEDICINE

## 2023-02-06 ENCOUNTER — TELEPHONE (OUTPATIENT)
Dept: CARDIOLOGY CLINIC | Facility: CLINIC | Age: 83
End: 2023-02-06

## 2023-02-06 VITALS
HEART RATE: 68 BPM | SYSTOLIC BLOOD PRESSURE: 134 MMHG | HEIGHT: 70 IN | WEIGHT: 175 LBS | BODY MASS INDEX: 25.05 KG/M2 | DIASTOLIC BLOOD PRESSURE: 70 MMHG

## 2023-02-06 DIAGNOSIS — I34.0 MITRAL VALVE INSUFFICIENCY, UNSPECIFIED ETIOLOGY: ICD-10-CM

## 2023-02-06 DIAGNOSIS — I42.0 DILATED CARDIOMYOPATHY (HCC): ICD-10-CM

## 2023-02-06 DIAGNOSIS — I48.0 PAROXYSMAL ATRIAL FIBRILLATION (HCC): ICD-10-CM

## 2023-02-06 LAB
AORTIC ROOT: 3.9 CM
AORTIC VALVE MEAN VELOCITY: 8.1 M/S
APICAL FOUR CHAMBER EJECTION FRACTION: 53 %
ASCENDING AORTA: 3.2 CM
AV AREA BY CONTINUOUS VTI: 3.7 CM2
AV AREA PEAK VELOCITY: 2.6 CM2
AV LVOT MEAN GRADIENT: 1 MMHG
AV LVOT PEAK GRADIENT: 2 MMHG
AV MEAN GRADIENT: 3 MMHG
AV PEAK GRADIENT: 7 MMHG
AV VALVE AREA: 3.68 CM2
AV VELOCITY RATIO: 0.58
DOP CALC AO PEAK VEL: 1.32 M/S
DOP CALC AO VTI: 25.52 CM
DOP CALC LVOT AREA: 4.52 CM2
DOP CALC LVOT DIAMETER: 2.4 CM
DOP CALC LVOT PEAK VEL VTI: 20.75 CM
DOP CALC LVOT PEAK VEL: 0.76 M/S
DOP CALC LVOT STROKE INDEX: 49.2 ML/M2
DOP CALC LVOT STROKE VOLUME: 93.82 CM3
E WAVE DECELERATION TIME: 135 MS
FRACTIONAL SHORTENING: 26 % (ref 28–44)
INTERVENTRICULAR SEPTUM IN DIASTOLE (PARASTERNAL SHORT AXIS VIEW): 1.4 CM
INTERVENTRICULAR SEPTUM: 1.4 CM (ref 0.6–1.1)
LAAS-AP2: 21.1 CM2
LAAS-AP4: 20 CM2
LEFT ATRIUM SIZE: 4.1 CM
LEFT INTERNAL DIMENSION IN SYSTOLE: 4 CM (ref 2.1–4)
LEFT VENTRICLE DIASTOLIC VOLUME (MOD BIPLANE): 129 ML
LEFT VENTRICLE SYSTOLIC VOLUME (MOD BIPLANE): 59 ML
LEFT VENTRICULAR INTERNAL DIMENSION IN DIASTOLE: 5.4 CM (ref 3.5–6)
LEFT VENTRICULAR POSTERIOR WALL IN END DIASTOLE: 1.2 CM
LEFT VENTRICULAR STROKE VOLUME: 71 ML
LV EF: 55 %
LVSV (TEICH): 71 ML
MITRAL REGURGITATION PEAK VELOCITY: 5.76 M/S
MITRAL VALVE MEAN INFLOW VELOCITY: 4.59 M/S
MITRAL VALVE REGURGITANT PEAK GRADIENT: 133 MMHG
MV E'TISSUE VEL-LAT: 5 CM/S
MV E'TISSUE VEL-SEP: 3 CM/S
MV PEAK A VEL: 0.74 M/S
MV PEAK E VEL: 65 CM/S
MV STENOSIS PRESSURE HALF TIME: 39 MS
MV VALVE AREA P 1/2 METHOD: 5.64 CM2
PISA MRMAX VEL: 0.3 M/S
PISA RADIUS: 0.5 CM
PV PEAK GRADIENT: 4 MMHG
RIGHT ATRIUM AREA SYSTOLE A4C: 21.9 CM2
RIGHT VENTRICLE ID DIMENSION: 4.7 CM
SL CV DOP CALC MV VTI RETROGRADE: 164.3 CM
SL CV LEFT ATRIUM LENGTH A2C: 5.1 CM
SL CV MV MEAN GRADIENT RETROGRADE: 94 MMHG
SL CV PED ECHO LEFT VENTRICLE DIASTOLIC VOLUME (MOD BIPLANE) 2D: 142 ML
SL CV PED ECHO LEFT VENTRICLE SYSTOLIC VOLUME (MOD BIPLANE) 2D: 71 ML
TR MAX PG: 23 MMHG
TR PEAK VELOCITY: 2.4 M/S
TRICUSPID VALVE PEAK REGURGITATION VELOCITY: 2.42 M/S

## 2023-02-06 NOTE — TELEPHONE ENCOUNTER
----- Message from Bijal Bravo sent at 2/6/2023  2:12 PM EST -----  Please notify patient that his echocardiogram shows normal heart function with moderate regurgitation "leakiness" across the mitral valve  Dr Roxanne Sheikh will review in detail at his upcoming appointment  Thank you!

## 2023-02-22 ENCOUNTER — OFFICE VISIT (OUTPATIENT)
Dept: CARDIOLOGY CLINIC | Facility: CLINIC | Age: 83
End: 2023-02-22
Payer: MEDICARE

## 2023-02-22 VITALS
HEIGHT: 71 IN | OXYGEN SATURATION: 96 % | WEIGHT: 186.6 LBS | SYSTOLIC BLOOD PRESSURE: 134 MMHG | DIASTOLIC BLOOD PRESSURE: 82 MMHG | HEART RATE: 69 BPM | BODY MASS INDEX: 26.12 KG/M2 | TEMPERATURE: 97.8 F

## 2023-02-22 DIAGNOSIS — I47.29 NSVT (NONSUSTAINED VENTRICULAR TACHYCARDIA) (HCC): ICD-10-CM

## 2023-02-22 DIAGNOSIS — I34.0 MITRAL VALVE INSUFFICIENCY, UNSPECIFIED ETIOLOGY: ICD-10-CM

## 2023-02-22 DIAGNOSIS — I49.1 PREMATURE ATRIAL CONTRACTIONS: ICD-10-CM

## 2023-02-22 DIAGNOSIS — Z86.79 HISTORY OF TACHYCARDIA-BRADYCARDIA SYNDROME: Primary | ICD-10-CM

## 2023-02-22 DIAGNOSIS — Z95.0 PRESENCE OF CARDIAC PACEMAKER: ICD-10-CM

## 2023-02-22 DIAGNOSIS — I77.810 DILATED AORTIC ROOT (HCC): ICD-10-CM

## 2023-02-22 DIAGNOSIS — I42.0 DILATED CARDIOMYOPATHY (HCC): ICD-10-CM

## 2023-02-22 DIAGNOSIS — I48.0 PAROXYSMAL ATRIAL FIBRILLATION (HCC): ICD-10-CM

## 2023-02-22 DIAGNOSIS — E78.2 MIXED HYPERLIPIDEMIA: ICD-10-CM

## 2023-02-22 PROCEDURE — 99214 OFFICE O/P EST MOD 30 MIN: CPT | Performed by: INTERNAL MEDICINE

## 2023-02-22 RX ORDER — PANTOPRAZOLE SODIUM 40 MG/1
40 TABLET, DELAYED RELEASE ORAL DAILY
COMMUNITY
Start: 2023-02-03

## 2023-02-22 RX ORDER — CETIRIZINE HYDROCHLORIDE 10 MG/1
CAPSULE, LIQUID FILLED ORAL
COMMUNITY
End: 2023-03-21

## 2023-02-22 RX ORDER — MULTIVITAMIN/IRON/FOLIC ACID 18MG-0.4MG
TABLET ORAL
COMMUNITY

## 2023-02-22 RX ORDER — ROPINIROLE 0.5 MG/1
0.5 TABLET, FILM COATED ORAL
COMMUNITY
Start: 2023-02-03 | End: 2023-11-28 | Stop reason: DRUGHIGH

## 2023-02-22 NOTE — PROGRESS NOTES
Cardiology Office Visit    Rancho Cadena  [id number]  1940    [facility]  [address] [facility] [place] [address] [phone]      Dear Jayson Robison MD,    I had the pleasure of seeing your patient at our [facility] [place] office today 2/22/2023. As you know he is a pleasant 78y.o. year old male with a medical history as described below. Reason for office visit: Follow up sick sinus syndrome s/p PPM, hyperlipidemia, atrial fibrillation, hyperlipidemia and dilated cardiomyopathy. 1. History of tachycardia-bradycardia syndrome    2. Presence of cardiac pacemaker    3. Dilated cardiomyopathy ([address])  -     NM myocardial perfusion spect (stress and/or rest); Future; Expected date: 02/22/2023    4. Mixed hyperlipidemia    5. Paroxysmal atrial fibrillation (HCC)  -     NM myocardial perfusion spect (stress and/or rest); Future; Expected date: 02/22/2023    6. Mitral valve insufficiency, unspecified etiology    7. Dilated aortic root ([address])    8. Premature atrial contractions    9. NSVT (nonsustained ventricular tachycardia)  -     NM myocardial perfusion spect (stress and/or rest); Future; Expected date: 02/22/2023       Plan:   Devise interrogation showed short episodes of NSVT. His metoprolol succinate dose was increased to 50 mg daily. Echocardiogram to assess LVEF was done and showed EF 50-55%. I have recommended nuclear stress test to rule out ischemia (doubt as he is very active and has no symptoms but will do to ensure we are not missing anything). He was instructed to continue his current medications without change. He was instructed to call with any change in symptoms or with any questions or concerns. Consider adding magnesium at follow up.    Will plan routine follow up in 6 months unless symptoms or stress test results warrant earlier assessment.   __________________________        HPI     Sung Smith has a past medical history of sick sinus syndrome requiring PPM in 6/2009, dilated cardiomyopathy, fall with rib fracture and resulting hemothorax in 10/2020, and subsequent DVT on Eliquis. Patient was following with Dr. Jewel Shanks at the [address] [facility]. He transferred to [facility] in spring 2021. I initially saw the patient 8/27/2021 for consultation. His pacemaker device management was transitioned to [facility] at that time. He was seen in the office 9/16/2021 by Jaylen  after evaluation at [address] [place] [address] for left sided chest pain. Cardiac work up was unrevealing. He denied any recurrent chest pain at follow up. At that time his device interrogation revealed 4 episodes of NSVT and 11 episodes of atrial fibrillation with the longest episode lasting 3 hrs and 58 minutes. Metoprolol succinate was initiated. He was advised that his Eliquis will continue long-term for stroke prevention. He was asymptomatic with the episodes. His device interrogation on 10/14/2021 revealed his pacemaker was at [facility]. He met with Dr. Bird Sotomayor for consultation on that date. He underwent pacemaker generator replacement on 10/21/2021 at [facility] by Dr. Bird Sotomayor. He was last seen 10/29/2021 by Jaylen  in the office at which time he was doing well. He did note some left sided rib pain which he associated with the weather. 1/27/2022: Patient presents to the office today for follow up. He does continue to have rib pain on occasion. No chest pain. No shortness of breath. No lightheadedness or dizziness. He is riding his bike 10 miles a day. Questioning the need for Eliquis. Echocardiogram is scheduled for 2/2022.     8/26/2022: Patient presents to the office today for follow up. He continues to ride his bike. No chest pain. No shortness of breath. No palpitations. 2/22/2023: Kaleb Newman returns to the office today for follow up. He is doing very well overall. He denies any chest pain or shortness of breath.  Occasional left sided pain in ribs. He continues to ride his bike (electric and mountain bike). He tries to do  miles per week. Wife tells me she feels as if he 'puffs' at times but he denies any difficulty with his breathing. He also lifts weights daily. Device interrogation 1/10/2023 showed 24 VT episodes with the longest 10 beats and one episode of SVT-ST at 176 bpm lasting 15 seconds. His metoprolol succinate was increased to 50 mg daily after interrogation. Echocardiogram 2/6/2023 showed an EF of 50-55%.     Patient Active Problem List   Diagnosis    Non-seasonal allergic rhinitis    Acute respiratory failure with hypoxia (HCC)    Closed fracture of multiple ribs of left side with routine healing    History of tachycardia-bradycardia syndrome    Bladder tumor    MR (mitral regurgitation)    Normocytic anemia    Chest pain    Premature atrial contractions    Hyperlipidemia    Dilated aortic root (HCC)    Dilated cardiomyopathy (HCC)    History of venous thromboembolism    Other specified hypothyroidism    Elevated serum creatinine    Paroxysmal atrial fibrillation (HCC)    BPH (benign prostatic hyperplasia)    Gastroesophageal reflux disease    Presence of cardiac pacemaker     Past Medical History:   Diagnosis Date    Bladder cancer ([address])     Dilated aortic root (HCC)     Dilated cardiomyopathy (HCC)     DVT (deep venous thrombosis) (HCC)     GERD (gastroesophageal reflux disease)     Hyperlipidemia     Mitral regurgitation     Pacemaker     Pleural effusion     Rib fractures     SSS (sick sinus syndrome) ([address])      Social History     Socioeconomic History    Marital status: /Civil Union     Spouse name: Not on file    Number of children: Not on file    Years of education: Not on file    Highest education level: Not on file   Occupational History    Not on file   Tobacco Use    Smoking status: Never    Smokeless tobacco: Never   Vaping Use    Vaping Use: Never used   Substance and Sexual Activity    Alcohol use: Not Currently Drug use: Not Currently    Sexual activity: Not Currently   Other Topics Concern    Not on file   Social History Narrative    Not on file     Social Determinants of Health     Financial Resource Strain: Not on file   Food Insecurity: Not on file   Transportation Needs: Not on file   Physical Activity: Not on file   Stress: Not on file   Social Connections: Not on file   Intimate Partner Violence: Not on file   Housing Stability: Not on file      Family History   Problem Relation Age of Onset    Lung cancer Mother     Colon cancer Father     COPD Sister      Past Surgical History:   Procedure Laterality Date    BLADDER TUMOR EXCISION      CARDIAC ELECTROPHYSIOLOGY PROCEDURE N/A 10/21/2021    PPM generator change - dual. Surgeon: Damari Putnam MD    CARDIAC PACEMAKER PLACEMENT  06/09/2009 2009, generator replacement 10/2021    HERNIA REPAIR      X2    THORACOSCOPY VIDEO ASSISTED SURGERY (VATS) Left 11/21/2020    THORACOSCOPY VIDEO ASSISTED SURGERY (VATS), washout of hemothorax, decortication.  Surgeon: Cally Low MD;  Location: [facility];  Service: Thoracic    TONSILLECTOMY         Current Outpatient Medications:     Cetirizine HCl (ZyrTEC Allergy) 10 MG CAPS, Take by mouth, Disp: , Rfl:     Cholecalciferol 50 MCG (2000 UT) CAPS, Take by mouth, Disp: , Rfl:     Coenzyme Q10 (CO Q 10 PO), Take by mouth, Disp: , Rfl:     Cyanocobalamin (VITAMIN B 12 PO), Take by mouth, Disp: , Rfl:     Eliquis 5 MG, Take 1 tablet (5 mg total) by mouth 2 (two) times a day, Disp: 180 tablet, Rfl: 3    Glucosamine-Chondroitin 1790-2033 MG/30ML LIQD, Take by mouth, Disp: , Rfl:     levothyroxine 25 mcg tablet, , Disp: , Rfl:     metoprolol succinate (TOPROL-XL) 50 mg 24 hr tablet, Take 1 tablet (50 mg total) by mouth daily, Disp: 90 tablet, Rfl: 3    Multiple Vitamins-Minerals (PRESERVISION AREDS 2+MULTI VIT PO), Take by mouth, Disp: , Rfl:     pantoprazole (PROTONIX) 40 mg tablet, Take 40 mg by mouth daily, Disp: , Rfl: rOPINIRole (REQUIP) 0.25 mg tablet, 1 TABLET BY MOUTH TWICE A DAY (EARLY EVENING, BEDTIME), Disp: , Rfl:     simvastatin (ZOCOR) 40 mg tablet, Take 40 mg by mouth daily at bedtime , Disp: , Rfl:      Allergies   Allergen Reactions    Penicillins Anaphylaxis and Hives       Cardiac Test Results:     Lipid panel 11/12/2021: C 184. T 87. H 60. L 107. ECG 10/29/2021: Atrial paced rhythm with prolonged AV conduction with PAC. Right bundle branch block. Left posterior fascicular block. T wave abnormality. 69 BPM.      NM Myocardial Perfusion Spect (Exercise Induced Stress and/or Rest) 9/3/2021:  Duration of exercise was 7 min and 0 sec. Target heart rate was achieved. There was no chest pain during stress. -  Gated SPECT: The calculated left ventricular ejection fraction was 45 %. There was mild global left ventricular hypokinesis. IMPRESSIONS: No evidence of ischemia/myocardium at risk. Inferior wall perfusion abnormality noted which may represent attenuation artifact given reasonable wall thickening in this segment. Low risk perfusion findings. Mildly reduced LV function suggested on gated analysis. Echocardiogram 8/6/2021:  EF 29% Grade 1 diastolic dysfunction. Mild to moderate MR. Mild TR. Trace SD. Aortic root 3.5cm, ascending aorta 3.6cm. Compared to prior study 5/27/2020 the mitral regurgitation was previously reported to be possibly severe but appears at most moderate on this study. No other significant changes. HUSSEIN 6/17/2020:  EF 40-45%. Left atrium mildly dilated. Mild to moderate MR. Review of Systems:    Review of Systems   Constitutional:  Negative for activity change, appetite change and fatigue. HENT:  Negative for congestion, hearing loss, tinnitus and trouble swallowing. Eyes:  Negative for visual disturbance. Respiratory:  Negative for cough, chest tightness, shortness of breath and wheezing. Cardiovascular:  Negative for chest pain, palpitations and leg swelling. Gastrointestinal:  Negative for abdominal distention, abdominal pain, nausea and vomiting. Genitourinary:  Negative for difficulty urinating. Musculoskeletal:  Negative for arthralgias. Skin:  Negative for rash. Neurological:  Negative for dizziness, syncope and light-headedness. Hematological:  Does not bruise/bleed easily. Psychiatric/Behavioral:  Negative for confusion. The patient is not nervous/anxious. All other systems reviewed and are negative. Vitals:    02/22/23 1116 02/22/23 1206   BP: 152/88 134/82   BP Location: Left arm    Patient Position: Sitting    Cuff Size: Standard    Pulse: 69    Temp: 97.8 °F (36.6 °C)    SpO2: 96%    Weight: 84.6 kg (186 lb 9.6 oz)    Height: 5' 11" (1.803 m)      Vitals:    02/22/23 1116   Weight: 84.6 kg (186 lb 9.6 oz)     Height: 5' 11" (180.3 cm)     Physical Exam  Constitutional:       Appearance: He is well-developed. HENT:      Head: Normocephalic and atraumatic. Eyes:      Conjunctiva/sclera: Conjunctivae normal.      Pupils: Pupils are equal, round, and reactive to light. Neck:      Vascular: No JVD. Cardiovascular:      Rate and Rhythm: Normal rate and regular rhythm. Occasional Extrasystoles are present. Heart sounds: Normal heart sounds. No murmur heard. No friction rub. No gallop. Pulmonary:      Effort: Pulmonary effort is normal.      Breath sounds: Normal breath sounds. Abdominal:      General: Bowel sounds are normal.      Palpations: Abdomen is soft. Musculoskeletal:      Cervical back: Normal range of motion. Skin:     General: Skin is warm and dry. Neurological:      Mental Status: He is alert and oriented to person, place, and time.    Psychiatric:         Behavior: Behavior normal.

## 2023-02-22 NOTE — PATIENT INSTRUCTIONS
I would recommend updated stress test given findings on device interrogation. Call me with any change in symptoms. Edis Roll[employer] [facility]) monitor.

## 2023-03-21 ENCOUNTER — HOSPITAL ENCOUNTER (OUTPATIENT)
Dept: NUCLEAR MEDICINE | Facility: HOSPITAL | Age: 83
Discharge: HOME | End: 2023-03-21
Attending: INTERNAL MEDICINE

## 2023-03-21 ENCOUNTER — HOSPITAL ENCOUNTER (OUTPATIENT)
Dept: NON INVASIVE DIAGNOSTICS | Facility: HOSPITAL | Age: 83
Discharge: HOME | End: 2023-03-21
Attending: INTERNAL MEDICINE

## 2023-03-21 DIAGNOSIS — I42.0 DILATED CARDIOMYOPATHY (HCC): ICD-10-CM

## 2023-03-21 DIAGNOSIS — I48.0 PAROXYSMAL ATRIAL FIBRILLATION (HCC): ICD-10-CM

## 2023-03-21 DIAGNOSIS — I47.29 NSVT (NONSUSTAINED VENTRICULAR TACHYCARDIA) (HCC): ICD-10-CM

## 2023-03-21 LAB
ARRHY DURING EX: NORMAL
CHEST PAIN STATEMENT: NORMAL
MAX DIASTOLIC BP: 80 MMHG
MAX HEART RATE: 110 BPM
MAX HR PERCENT: 80 %
MAX HR: 110 BPM
MAX PREDICTED HEART RATE: 136 BPM
MAX. SYSTOLIC BP: 150 MMHG
NUC STRESS EJECTION FRACTION: 40 %
PROTOCOL NAME: NORMAL
RATE PRESSURE PRODUCT: NORMAL
SL CV REST NUCLEAR ISOTOPE DOSE: 10.7 MCI
SL CV STRESS NUCLEAR ISOTOPE DOSE: 32.8 MCI
SL CV STRESS RECOVERY BP: NORMAL MMHG
SL CV STRESS RECOVERY HR: 88 BPM
SL CV STRESS RECOVERY O2 SAT: 97 %
SL CV STRESS STAGE REACHED: 3
STRESS ANGINA INDEX: 0
STRESS BASELINE BP: NORMAL MMHG
STRESS BASELINE HR: 65 BPM
STRESS DUKE TREADMILL SCORE: 7
STRESS O2 SAT REST: 94 %
STRESS PEAK HR: 110 BPM
STRESS POST ESTIMATED WORKLOAD: 9.3 METS
STRESS POST EXERCISE DUR MIN: 6 MIN
STRESS POST EXERCISE DUR SEC: 46 SEC
STRESS POST O2 SAT PEAK: 97 %
STRESS POST PEAK BP: 150 MMHG
STRESS ST DEPRESSION: 0 MM
TARGET HR FORMULA: NORMAL
TEST INDICATION: NORMAL
TIME IN EXERCISE PHASE: NORMAL

## 2023-03-24 ENCOUNTER — TELEPHONE (OUTPATIENT)
Dept: CARDIOLOGY CLINIC | Facility: CLINIC | Age: 83
End: 2023-03-24

## 2023-03-24 NOTE — TELEPHONE ENCOUNTER
----- Message from Bijal Bravo sent at 3/24/2023 11:30 AM EDT -----  Please notify patient that his stress test did not show evidence of a blockage  We will review in detail at his upcoming appointment    Thank you!!

## 2023-07-13 ENCOUNTER — REMOTE DEVICE CLINIC VISIT (OUTPATIENT)
Dept: CARDIOLOGY CLINIC | Facility: CLINIC | Age: 83
End: 2023-07-13
Payer: MEDICARE

## 2023-07-13 DIAGNOSIS — Z95.0 PRESENCE OF PERMANENT CARDIAC PACEMAKER: Primary | ICD-10-CM

## 2023-07-13 PROCEDURE — 93296 REM INTERROG EVL PM/IDS: CPT | Performed by: INTERNAL MEDICINE

## 2023-07-13 PROCEDURE — 93294 REM INTERROG EVL PM/LDLS PM: CPT | Performed by: INTERNAL MEDICINE

## 2023-07-13 NOTE — PROGRESS NOTES
Results for orders placed or performed in visit on 07/13/23   Cardiac EP device report    Narrative    MDT D-PM/ACTIVE SYSTEM IS MRI CONDITIONAL  CARELINK TRANSMISSION: BATTERY VOLTAGE ADEQUATE (11.2 YRS). AP 88.7%  99.4% (>40%/HIS/AAIR~DDDR 60) ALL AVAILABLE LEAD PARAMETERS WITHIN NORMAL LIMITS. NO SIGNIFICANT HIGH RATE EPISODES. NORMAL DEVICE FUNCTION.  AM/KHALIL

## 2023-08-22 RX ORDER — ROPINIROLE 0.5 MG/1
TABLET, FILM COATED ORAL
COMMUNITY
Start: 2023-05-30 | End: 2023-08-24 | Stop reason: DRUGHIGH

## 2023-08-24 ENCOUNTER — OFFICE VISIT (OUTPATIENT)
Dept: CARDIOLOGY CLINIC | Facility: CLINIC | Age: 83
End: 2023-08-24
Payer: MEDICARE

## 2023-08-24 VITALS
OXYGEN SATURATION: 98 % | DIASTOLIC BLOOD PRESSURE: 68 MMHG | BODY MASS INDEX: 25.26 KG/M2 | TEMPERATURE: 97.2 F | HEIGHT: 71 IN | WEIGHT: 180.4 LBS | HEART RATE: 68 BPM | SYSTOLIC BLOOD PRESSURE: 120 MMHG

## 2023-08-24 DIAGNOSIS — I42.0 DILATED CARDIOMYOPATHY (HCC): Primary | ICD-10-CM

## 2023-08-24 DIAGNOSIS — Z86.79 HISTORY OF TACHYCARDIA-BRADYCARDIA SYNDROME: ICD-10-CM

## 2023-08-24 DIAGNOSIS — I48.0 PAROXYSMAL ATRIAL FIBRILLATION (HCC): ICD-10-CM

## 2023-08-24 DIAGNOSIS — E78.2 MIXED HYPERLIPIDEMIA: ICD-10-CM

## 2023-08-24 DIAGNOSIS — I77.810 DILATED AORTIC ROOT (HCC): ICD-10-CM

## 2023-08-24 DIAGNOSIS — Z95.0 PRESENCE OF CARDIAC PACEMAKER: ICD-10-CM

## 2023-08-24 DIAGNOSIS — I34.0 MITRAL VALVE INSUFFICIENCY, UNSPECIFIED ETIOLOGY: ICD-10-CM

## 2023-08-24 PROBLEM — J96.01 ACUTE RESPIRATORY FAILURE WITH HYPOXIA (HCC): Status: RESOLVED | Noted: 2020-11-18 | Resolved: 2023-08-24

## 2023-08-24 PROCEDURE — 99214 OFFICE O/P EST MOD 30 MIN: CPT | Performed by: NURSE PRACTITIONER

## 2023-08-24 NOTE — ASSESSMENT & PLAN NOTE
History of sick sinus syndrome resulting in pacemaker insertion in June of 2009. [facility] took over device management. Device interrogation on 10/14/2021 revealed device at [facility]. Pt underwent device generator replacement by Dr. Murphy Headley on 10/21/21.

## 2023-08-24 NOTE — ASSESSMENT & PLAN NOTE
Pacemaker device interrogation on 9/8/2021 reveals 11 episodes of atrial fibrillation, longest episode lasting 3 hours and 58 minutes, which was a new finding. Patient was already on Eliquis 5mg BID for DVT treatment. Advised that we will continue this anticoagulation long-term for stroke prevention. He remains on metoprolol succinate 50 mg daily. Most recent device interrogation 7/13/23 shows no high rate episodes.

## 2023-08-24 NOTE — ASSESSMENT & PLAN NOTE
Lipid panel 11/12/2021: . TG 87. HDL 60.   Pt remains on Simvastatin 40mg daily  I have requested labs from PCP. Goal LDL < 100.

## 2023-08-24 NOTE — PATIENT INSTRUCTIONS
Continue current regimen. I will get labs from Dr. Chinedu Aguayo. Please have them send upcoming labs to us. Contact us with any concerning symptoms.

## 2023-08-24 NOTE — PROGRESS NOTES
Cardiology Follow Up    Tierra Bach  1940  [id number]  [facility]  [address]  [address] [facility] [place]  [phone]    Moshe Moore presents today for routine follow up of dilated cardiomyopathy, paroxysmal atrial fibrillation, sick sinus syndrome S/P pacemaker placement in 2009 with device generator replacement 10/21/2021.    1. Dilated cardiomyopathy ([address])  Assessment & Plan:  Patient has a history of reduced LVEF. Stage 1A heart failure. He had an abnormal stress test in 2009 and underwent cardiac cath at that time that revealed no obvious coronary disease. Holter monitor in 2019 revealed occasional PAC's and rare PVC's  HUSSEIN in 6/2020 with EF 40-45%  Echocardiogram 2/2/2023 shows LVEF 50-55%. Currently he is on metoprolol succinate 50 mg daily. He has not been started on ACE/ARB secondary to history of hypotension, although this could be considered at follow up as BP is acceptable. He appears euvolemic without diuretics. 2. Paroxysmal atrial fibrillation ([address])  Assessment & Plan:  Pacemaker device interrogation on 9/8/2021 reveals 11 episodes of atrial fibrillation, longest episode lasting 3 hours and 58 minutes, which was a new finding. Patient was already on Eliquis 5mg BID for DVT treatment. Advised that we will continue this anticoagulation long-term for stroke prevention. He remains on metoprolol succinate 50 mg daily. Most recent device interrogation 7/13/23 shows no high rate episodes. 3. Mitral valve insufficiency, unspecified etiology  Assessment & Plan:  Mild to moderate MR noted on HUSSEIN in 6/2020  Echocardiogram 2/2/2023 shows moderate MR. He currently denies shortness of breath and does not exhibit volume overload. Will repeat echo 2/2024 or sooner if symptoms warrant. 4. Dilated aortic root ([address])  Assessment & Plan:   Aortic root has measured 3.8-4cm on prior imaging  Echocardiogram 2/2/2023 with aortic root 3.9 cm. Will monitor again 2/2024.      5. History of tachycardia-bradycardia syndrome  Assessment & Plan:  History of sick sinus syndrome resulting in pacemaker insertion in June of 2009. [facility] took over device management. Device interrogation on 10/14/2021 revealed device at [facility]. Pt underwent device generator replacement by Dr. Kelsey Krabbe on 10/21/21. 6. Presence of cardiac pacemaker  Assessment & Plan:  See discussion under tachy-cinda syndrome      7. Mixed hyperlipidemia  Assessment & Plan:  Lipid panel 11/12/2021: . TG 87. HDL 60.   Pt remains on Simvastatin 40mg daily  I have requested labs from PCP. Goal LDL < 100. SOL Herr has a past medical history of sick sinus syndrome requiring PPM in 6/2009, dilated cardiomyopathy, fall with rib fracture and resulting hemothorax in 10/2020, and subsequent DVT on Eliquis.     He was initially following with Dr. Stephane Cool at the [address] [place] [address]. He transferred to [facility] in spring 2021.     He met with Dr. Laron Carranza on 8/27/2021 for consultation. His pacemaker device management was transitioned to [facility] at that time.      He followed up with me on 9/16/2021 after evaluation at [address] [place] [address] for left sided chest pain. Cardiac work up was unrevealing. He denied any recurrent chest pain at follow up. At that time his device interrogation revealed 4 episodes of NSVT and 11 episodes of atrial fibrillation with the longest episode lasting 3 hrs and 58 minutes. Metoprolol succinate was initiated. He was advised that his Eliquis will continue long-term for stroke prevention. He was asymptomatic with the episodes.     His device interrogation on 10/14/2021 revealed his pacemaker was at [facility]. He met with Dr. Kelsey Krabbe for consultation on that date. He underwent pacemaker generator replacement on 10/21/2021 at [facility] by Dr. Kelsey Krabbe. He followed up most recently with Dr. Laron Carranza on 2/22/2023.  His device interrogation 1/10/23 showed 24 episodes of NSVT, longest 10 beats. His metoprolol succinate was increased to 50 mg daily. An echocardiogram and stress test were ordered. Echocardiogram 2/6/2023 showed LVEF 50-55% with mild-mod LVH, mod MR. Exercise nuclear stress test 3/21/2023 showed no perfused defects with Gated EF 40%. 8/24/2023: Hamilton Douglas presents today accompanied by his wife for routine follow up. We reviewed the results of his stress test again in detail. He states he feels very well. He has no complaints. He remains active and is tolerating well. He denies chest pain, shortness of breath or dyspnea on exertion, edema, palpitations, or lightheadedness. His wife states he has been experiencing bleeding hemorrhoids - he met with GI about this. He is seeing his PCP next week. I have requested recent labs. His device interrogation 7/13 showed normal function with no high rate episodes.     Medical Problems     Problem List     Dilated cardiomyopathy ([address])    Paroxysmal atrial fibrillation (HCC)    MR (mitral regurgitation)    Dilated aortic root (HCC)    History of tachycardia-bradycardia syndrome    Hyperlipidemia    Non-seasonal allergic rhinitis    Acute respiratory failure with hypoxia (HCC)    Closed fracture of multiple ribs of left side with routine healing    Bladder tumor    Normocytic anemia    Chest pain    Premature atrial contractions    History of venous thromboembolism    Other specified hypothyroidism    Elevated serum creatinine    BPH (benign prostatic hyperplasia)    Gastroesophageal reflux disease    Presence of cardiac pacemaker        Past Medical History:   Diagnosis Date   • Bladder cancer ([address])    • Dilated aortic root (HCC)    • Dilated cardiomyopathy ([address])    • DVT (deep venous thrombosis) (HCC)    • GERD (gastroesophageal reflux disease)    • Hyperlipidemia    • Mitral regurgitation    • Pacemaker    • Pleural effusion    • Rib fractures    • SSS (sick sinus syndrome) ([address]) Social History     Socioeconomic History   • Marital status: /Civil Union     Spouse name: Not on file   • Number of children: Not on file   • Years of education: Not on file   • Highest education level: Not on file   Occupational History   • Not on file   Tobacco Use   • Smoking status: Never   • Smokeless tobacco: Never   Vaping Use   • Vaping Use: Never used   Substance and Sexual Activity   • Alcohol use: Not Currently   • Drug use: Not Currently   • Sexual activity: Not Currently   Other Topics Concern   • Not on file   Social History Narrative   • Not on file     Social Determinants of Health     Financial Resource Strain: Not on file   Food Insecurity: Not on file   Transportation Needs: Not on file   Physical Activity: Not on file   Stress: Not on file   Social Connections: Not on file   Intimate Partner Violence: Not on file   Housing Stability: Not on file      Family History   Problem Relation Age of Onset   • Lung cancer Mother    • Colon cancer Father    • COPD Sister      Past Surgical History:   Procedure Laterality Date   • BLADDER TUMOR EXCISION     • CARDIAC ELECTROPHYSIOLOGY PROCEDURE N/A 10/21/2021    PPM generator change - dual. Surgeon: Sheree Guallpa MD   • CARDIAC PACEMAKER PLACEMENT  06/09/2009 2009, generator replacement 10/2021   • HERNIA REPAIR      X2   • THORACOSCOPY VIDEO ASSISTED SURGERY (VATS) Left 11/21/2020    THORACOSCOPY VIDEO ASSISTED SURGERY (VATS), washout of hemothorax, decortication.  Surgeon: Jenny Sierra MD;  Location: [facility];  Service: Thoracic   • TONSILLECTOMY         Current Outpatient Medications:   •  Cholecalciferol 50 MCG (2000 UT) CAPS, Take by mouth, Disp: , Rfl:   •  Coenzyme Q10 (CO Q 10 PO), Take by mouth, Disp: , Rfl:   •  Cyanocobalamin (VITAMIN B 12 PO), Take by mouth, Disp: , Rfl:   •  Eliquis 5 MG, Take 1 tablet (5 mg total) by mouth 2 (two) times a day, Disp: 180 tablet, Rfl: 3  •  Glucosamine-Chondroitin 8917-4204 MG/30ML LIQD, Take by mouth, Disp: , Rfl:   •  levothyroxine 25 mcg tablet, , Disp: , Rfl:   •  metoprolol succinate (TOPROL-XL) 50 mg 24 hr tablet, Take 1 tablet (50 mg total) by mouth daily, Disp: 90 tablet, Rfl: 3  •  Multiple Vitamins-Minerals (PRESERVISION AREDS 2+MULTI VIT PO), Take by mouth, Disp: , Rfl:   •  pantoprazole (PROTONIX) 40 mg tablet, Take 40 mg by mouth daily, Disp: , Rfl:   •  rOPINIRole (REQUIP) 0.5 mg tablet, 0.5 mg, Disp: , Rfl:   •  simvastatin (ZOCOR) 40 mg tablet, Take 40 mg by mouth daily at bedtime , Disp: , Rfl:   Allergies   Allergen Reactions   • Penicillins Anaphylaxis and Hives       Labs:     Chemistry        Component Value Date/Time    K 4.3 02/04/2022 1030     02/04/2022 1030    CO2 28 02/04/2022 1030    BUN 19 02/04/2022 1030    CREATININE 1.09 02/04/2022 1030        Component Value Date/Time    CALCIUM 8.5 02/04/2022 1030    ALKPHOS 49 02/04/2022 1030    AST 21 02/04/2022 1030    ALT 26 02/04/2022 1030        Cardiac Imaging:   Exercise nuclear stress test 3/21/2023:  Billy protocol. Achieved 9.3 METs and 80% MPHR. No perfusion defects. Gated EF 40%.    Echocardiogram 2/6/2023:  EF 50-55%. Mild-mod LVH. Mild diastolic dysfunction. RV enlarged with normal function. Trace AI. Mod MR. Mild TR with PASP 30 mmHg. Aortic root 3.9 cm, ascending aorta 3.2 cm. ECG 10/29/2021: atrial paced rhythm with prolonged AV conduction with PAC, right bundle branch block, left posterior fascicular block, T wave abnormality, rate 69     Exercise nuclear stress test 9/3/2021:  Duration of exercise was 7 min and 0 sec. Target heart rate was achieved. There was no chest pain during stress. -  Gated SPECT: The calculated left ventricular ejection fraction was 45 %. There was mild global left ventricular hypokinesis. IMPRESSIONS: No evidence of ischemia/myocardium at risk. Inferior wall perfusion abnormality noted which may represent attenuation artifact given reasonable wall thickening in this segment. Low risk perfusion findings. Mildly reduced LV function suggested on gated analysis.       Echocardiogram 8/6/2021:  EF 00% Grade 1 diastolic dysfunction. Mild to moderate MR. Mild TR. Trace PA. Aortic root 3.5cm, ascending aorta 3.6cm. Compared to prior study 5/27/2020 the mitral regurgitation was previously reported to be possibly severe but appears at most moderate on this study. No other significant changes.     HUSSEIN 6/17/2020:  EF 40-45%. Left atrium mildly dilated. Mild to moderate MR. Review of Systems   Constitutional: Negative. HENT: Negative. Cardiovascular: Negative for chest pain, dyspnea on exertion, irregular heartbeat, leg swelling, near-syncope, orthopnea and palpitations. Respiratory: Negative for cough and snoring. Endocrine: Negative. Hematologic/Lymphatic: Bruises/bleeds easily (hemorrhoids). Skin: Negative. Musculoskeletal: Negative. Gastrointestinal: Negative. Genitourinary: Negative. Neurological: Negative. Psychiatric/Behavioral: Negative. Vitals:    08/24/23 0811   BP: 120/68   Pulse: 68   Temp: (!) 97.2 °F (36.2 °C)   SpO2: 98%     Vitals:    08/24/23 0811   Weight: 81.8 kg (180 lb 6.4 oz)     Height: 5' 11" (180.3 cm)   Body mass index is 25.16 kg/m². Physical Exam  Vitals and nursing note reviewed. Constitutional:       General: He is not in acute distress. Appearance: He is well-developed. He is not diaphoretic. HENT:      Head: Normocephalic and atraumatic. Neck:      Vascular: No carotid bruit or JVD. Cardiovascular:      Rate and Rhythm: Normal rate and regular rhythm. Pulses: Intact distal pulses. Heart sounds: Normal heart sounds, S1 normal and S2 normal. No murmur heard. No friction rub. No gallop. Pulmonary:      Effort: Pulmonary effort is normal. No respiratory distress. Breath sounds: Normal breath sounds. Abdominal:      General: There is no distension. Palpations: Abdomen is soft. Tenderness: There is no abdominal tenderness. Skin:     General: Skin is warm and dry. Findings: No rash. Neurological:      Mental Status: He is alert and oriented to person, place, and time.    Psychiatric:         Behavior: Behavior normal.

## 2023-08-24 NOTE — ASSESSMENT & PLAN NOTE
Mild to moderate MR noted on HUSSEIN in 6/2020  Echocardiogram 2/2/2023 shows moderate MR. He currently denies shortness of breath and does not exhibit volume overload. Will repeat echo 2/2024 or sooner if symptoms warrant.

## 2023-08-24 NOTE — ASSESSMENT & PLAN NOTE
Patient has a history of reduced LVEF. Stage 1A heart failure. He had an abnormal stress test in 2009 and underwent cardiac cath at that time that revealed no obvious coronary disease. Holter monitor in 2019 revealed occasional PAC's and rare PVC's  HUSSEIN in 6/2020 with EF 40-45%  Echocardiogram 2/2/2023 shows LVEF 50-55%. Currently he is on metoprolol succinate 50 mg daily. He has not been started on ACE/ARB secondary to history of hypotension, although this could be considered at follow up as BP is acceptable. He appears euvolemic without diuretics.

## 2023-08-24 NOTE — ASSESSMENT & PLAN NOTE
Aortic root has measured 3.8-4cm on prior imaging  Echocardiogram 2/2/2023 with aortic root 3.9 cm. Will monitor again 2/2024.

## 2023-10-12 ENCOUNTER — REMOTE DEVICE CLINIC VISIT (OUTPATIENT)
Dept: CARDIOLOGY CLINIC | Facility: CLINIC | Age: 83
End: 2023-10-12
Payer: MEDICARE

## 2023-10-12 DIAGNOSIS — Z95.0 CARDIAC PACEMAKER IN SITU: Primary | ICD-10-CM

## 2023-10-12 PROCEDURE — 93294 REM INTERROG EVL PM/LDLS PM: CPT | Performed by: INTERNAL MEDICINE

## 2023-10-12 PROCEDURE — 93296 REM INTERROG EVL PM/IDS: CPT | Performed by: INTERNAL MEDICINE

## 2023-10-12 NOTE — PROGRESS NOTES
Results for orders placed or performed in visit on 10/12/23   Cardiac EP device report    Narrative    MDT D-PM/ACTIVE SYSTEM IS MRI CONDITIONAL  CARELINK TRANSMISSION: BATTERY VOLTAGE ADEQUATE (10.8 YRS). AP-88%, >99% (>40% Jasmin@[employer]). ALL AVAILABLE LEAD PARAMETERS WITHIN NORMAL LIMITS. NO SIGNIFICANT HIGH RATE EPISODES. NORMAL DEVICE FUNCTION.  GV

## 2023-11-27 NOTE — PROGRESS NOTES
Cardiology Follow Up    Carrington Bach  1940  [id number]  [facility]  [address]  [address] [facility] [place]  [phone]    Susan Starr presents today for routine follow up of dilated cardiomyopathy, paroxysmal atrial fibrillation, sick sinus syndrome S/P pacemaker placement. 1. Dilated cardiomyopathy ([address])  Assessment & Plan:  Patient has a history of reduced LVEF. Stage 1A heart failure. He had an abnormal stress test in 2009 and underwent cardiac cath at that time that revealed no obvious coronary disease. Holter monitor in 2019 revealed occasional PAC's and rare PVC's  HUSSEIN in 6/2020 with EF 40-45%  Echocardiogram 2/2/2023 shows LVEF 50-55%. Currently he is on metoprolol succinate 50 mg daily. He has not been started on ACE/ARB secondary to history of hypotension and episodic lightheadedness. He appears euvolemic without diuretics. Update echocardiogram 2/2024    Orders:  -     POCT ECG  -     Echo complete w/ contrast if indicated; Future; Expected date: 02/05/2024    2. Mitral valve insufficiency, unspecified etiology  Assessment & Plan:  Mild to moderate MR noted on HUSSEIN in 6/2020  Echocardiogram 2/2/2023 shows moderate MR. He currently denies shortness of breath and does not exhibit volume overload. Will repeat echo 2/2024.      3. Paroxysmal atrial fibrillation [facility])  Assessment & Plan:  Pacemaker device interrogation on 9/8/2021 reveals 11 episodes of atrial fibrillation, longest episode lasting 3 hours and 58 minutes, which was a new finding. Patient was already on Eliquis 5mg BID for DVT treatment. Advised that we will continue this anticoagulation long-term for stroke prevention. He remains on metoprolol succinate 50 mg daily. Most recent device interrogation 10/10/23 shows no high rate episodes. Orders:  -     POCT ECG    4.  History of tachycardia-bradycardia syndrome  Assessment & Plan:  History of sick sinus syndrome resulting in pacemaker insertion in June of 2009. [facility] took over device management. Device interrogation on 10/14/2021 revealed device at [facility]. Pt underwent device generator replacement by Dr. Thuy Maldonado on 10/21/21. 5. Presence of cardiac pacemaker  Assessment & Plan:  See discussion under tachy-cinda syndrome      6. Dilated aortic root ([address])  Assessment & Plan: Aortic root has measured 3.8-4cm on prior imaging  Echocardiogram 2/2/2023 with aortic root 3.9 cm. Will monitor again 2/2024.      7. Mixed hyperlipidemia  Assessment & Plan:  Lipid panel 5/31/2023: C 189. T 220. H 53. L 92. Pt remains on simvastatin 40 mg daily  Goal LDL < 100      8. Restless leg  Comments:  check iron panel. Add daily magnesium supplement  Orders:  -     Iron Panel (Includes Ferritin, Iron Sat%, Iron, and TIBC); Future    9. Abnormal finding of blood chemistry, unspecified  -     Iron Panel (Includes Ferritin, Iron Sat%, Iron, and TIBC); Future    10. Other specified hypothyroidism  Assessment & Plan:  Managed by PCP  Goal TSH 1-3  Remains on levothyroxine 25 mcg daily      11. Gastroesophageal reflux disease without esophagitis  Assessment & Plan:  Controlled on pantoprazole 40 mg daily. SOL Bhat has a past medical history of sick sinus syndrome requiring PPM in 6/2009, dilated cardiomyopathy, fall with rib fracture and resulting hemothorax in 10/2020, and subsequent DVT on Eliquis. He was initially following with Dr. Natali Vidal at the [address] He transferred to [facility] in spring 2021. He met with Dr. Malaika Dove on 8/27/2021 for consultation. His pacemaker device management was transitioned to [facility] at that time. He followed up with me on 9/16/2021 after evaluation at [address] [place] [address] for left sided chest pain. Cardiac work up was unrevealing. He denied any recurrent chest pain at follow up.  At that time his device interrogation revealed 4 episodes of NSVT and 11 episodes of atrial fibrillation with the longest episode lasting 3 hrs and 58 minutes. Metoprolol succinate was initiated. He was advised that his Eliquis will continue long-term for stroke prevention. He was asymptomatic with the episodes. His device interrogation on 10/14/2021 revealed his pacemaker was at [facility]. He met with Dr. Jessica Armando for consultation on that date. He underwent pacemaker generator replacement on 10/21/2021 at [address] [place] [address] by Dr. Jessica Armando. He followed up most recently with Dr. Rosemarie You on 2/22/2023. His device interrogation 1/10/23 showed 24 episodes of NSVT, longest 10 beats. His metoprolol succinate was increased to 50 mg daily. An echocardiogram and stress test were ordered. Echocardiogram 2/6/2023 showed LVEF 50-55% with mild-mod LVH, mod MR. Exercise nuclear stress test 3/21/2023 showed no perfused defects with Gated EF 40%. 11/28/2023: Abbi Dean presents for routine follow up accompanied by his wife. ECG today shows ventricular paced rhythm. He recently saw the dermatologist and had basal cell carcinoma removed from his cheek. He continues to feel well from a cardiac standpoint. No chest pain, shortness of breath, palpitations, or edema. He reports positional lightheadedness occasionally no syncope/near syncope. He is not monitoring his BP at home. No current bleeding issues or GI complaints. He saw Dr. Casi Virk this morning and his Requip dose was increased from 0.5 to 1 mg nightly due to restless leg. His PPM interrogation in October showed normal function with no high rate episodes. He continues to bike regularly without any issues.      Medical Problems       Problem List       Dilated cardiomyopathy ([address])    Paroxysmal atrial fibrillation (HCC)    MR (mitral regurgitation)    Dilated aortic root (HCC)    History of tachycardia-bradycardia syndrome    Presence of cardiac pacemaker    Hyperlipidemia    Non-seasonal allergic rhinitis    Closed fracture of multiple ribs of left side with routine healing    Bladder tumor    Normocytic anemia    Chest pain    Premature atrial contractions    History of venous thromboembolism    Other specified hypothyroidism    Elevated serum creatinine    BPH (benign prostatic hyperplasia)    Gastroesophageal reflux disease        Past Medical History:   Diagnosis Date    Bladder cancer ([address])     Dilated aortic root (HCC)     Dilated cardiomyopathy (HCC)     DVT (deep venous thrombosis) (HCC)     GERD (gastroesophageal reflux disease)     Hyperlipidemia     Mitral regurgitation     Pacemaker     Pleural effusion     Rib fractures     SSS (sick sinus syndrome) ([facility])      Social History     Socioeconomic History    Marital status: /Civil Union     Spouse name: Not on file    Number of children: Not on file    Years of education: Not on file    Highest education level: Not on file   Occupational History    Not on file   Tobacco Use    Smoking status: Never    Smokeless tobacco: Never   Vaping Use    Vaping Use: Never used   Substance and Sexual Activity    Alcohol use: Not Currently    Drug use: Not Currently    Sexual activity: Not Currently   Other Topics Concern    Not on file   Social History Narrative    Not on file     Social Determinants of Health     Financial Resource Strain: Not on file   Food Insecurity: Not on file   Transportation Needs: Not on file   Physical Activity: Not on file   Stress: Not on file   Social Connections: Not on file   Intimate Partner Violence: Not on file   Housing Stability: Not on file      Family History   Problem Relation Age of Onset    Lung cancer Mother     Colon cancer Father     COPD Sister      Past Surgical History:   Procedure Laterality Date    BLADDER TUMOR EXCISION      CARDIAC ELECTROPHYSIOLOGY PROCEDURE N/A 10/21/2021    PPM generator change - dual. Surgeon: Lea Saeed MD    CARDIAC PACEMAKER PLACEMENT  06/09/2009 2009, generator replacement 10/2021    HERNIA REPAIR      X2 THORACOSCOPY VIDEO ASSISTED SURGERY (VATS) Left 11/21/2020    THORACOSCOPY VIDEO ASSISTED SURGERY (VATS), washout of hemothorax, decortication. Surgeon: Anali Mills MD;  Location: [facility];  Service: Thoracic    TONSILLECTOMY         Current Outpatient Medications:     Cholecalciferol 50 MCG (2000 UT) CAPS, Take by mouth, Disp: , Rfl:     Coenzyme Q10 (CO Q 10 PO), Take by mouth, Disp: , Rfl:     Cyanocobalamin (VITAMIN B 12 PO), Take by mouth, Disp: , Rfl:     Eliquis 5 MG, Take 1 tablet (5 mg total) by mouth 2 (two) times a day, Disp: 180 tablet, Rfl: 3    Glucosamine-Chondroitin 5146-6301 MG/30ML LIQD, Take by mouth, Disp: , Rfl:     levothyroxine 25 mcg tablet, , Disp: , Rfl:     metoprolol succinate (TOPROL-XL) 50 mg 24 hr tablet, Take 1 tablet (50 mg total) by mouth daily, Disp: 90 tablet, Rfl: 3    Multiple Vitamins-Minerals (PRESERVISION AREDS 2+MULTI VIT PO), Take by mouth, Disp: , Rfl:     pantoprazole (PROTONIX) 40 mg tablet, Take 40 mg by mouth daily, Disp: , Rfl:     rOPINIRole (REQUIP) 1 mg tablet, , Disp: , Rfl:     simvastatin (ZOCOR) 40 mg tablet, Take 40 mg by mouth daily at bedtime , Disp: , Rfl:   Allergies   Allergen Reactions    Penicillins Anaphylaxis and Hives       Labs:     Chemistry        Component Value Date/Time    K 4.3 02/04/2022 1030     02/04/2022 1030    CO2 28 02/04/2022 1030    BUN 19 02/04/2022 1030    CREATININE 1.09 02/04/2022 1030        Component Value Date/Time    CALCIUM 8.5 02/04/2022 1030    ALKPHOS 49 02/04/2022 1030    AST 21 02/04/2022 1030    ALT 26 02/04/2022 1030        Lipid panel 5/31/2023: C 189. T 220. H 53. L 92. Cardiac Imaging:   ECG 11/28/2023: Ventricular paced rhythm. Rate 66 bpm.    Exercise nuclear stress test 3/21/2023:  Billy protocol. Achieved 9.3 METs and 80% MPHR. No perfusion defects. Gated EF 40%. Echocardiogram 2/6/2023:  EF 50-55%. Mild-mod LVH. Mild diastolic dysfunction. RV enlarged with normal function. Trace AI.  Mod MR. Mild TR with PASP 30 mmHg. Aortic root 3.9 cm, ascending aorta 3.2 cm. ECG 10/29/2021: atrial paced rhythm with prolonged AV conduction with PAC, right bundle branch block, left posterior fascicular block, T wave abnormality, rate 69     Exercise nuclear stress test 9/3/2021:  Duration of exercise was 7 min and 0 sec. Target heart rate was achieved. There was no chest pain during stress. -  Gated SPECT: The calculated left ventricular ejection fraction was 45 %. There was mild global left ventricular hypokinesis. IMPRESSIONS: No evidence of ischemia/myocardium at risk. Inferior wall perfusion abnormality noted which may represent attenuation artifact given reasonable wall thickening in this segment. Low risk perfusion findings. Mildly reduced LV function suggested on gated analysis. Echocardiogram 8/6/2021:  EF 34% Grade 1 diastolic dysfunction. Mild to moderate MR. Mild TR. Trace WA. Aortic root 3.5cm, ascending aorta 3.6cm. Compared to prior study 5/27/2020 the mitral regurgitation was previously reported to be possibly severe but appears at most moderate on this study. No other significant changes. HUSSEIN 6/17/2020:  EF 40-45%. Left atrium mildly dilated. Mild to moderate MR. Review of Systems   Constitutional: Negative. HENT: Negative. Cardiovascular:  Negative for chest pain, dyspnea on exertion, irregular heartbeat, leg swelling, near-syncope, orthopnea and palpitations. Respiratory:  Negative for cough and snoring. Endocrine: Negative. Skin: Negative. Musculoskeletal: Negative. Gastrointestinal: Negative. Genitourinary: Negative. Neurological:  Positive for light-headedness (occasional, positional). Psychiatric/Behavioral: Negative.          Vitals:    11/28/23 1413   BP: 126/80   Pulse: 61   Temp: (!) 96.8 °F (36 °C)   SpO2: 98%     Vitals:    11/28/23 1413   Weight: 84.1 kg (185 lb 6.4 oz)     Height: 5' 11" (180.3 cm)   Body mass index is 25.86 kg/m². Physical Exam  Vitals and nursing note reviewed. Constitutional:       General: He is not in acute distress. Appearance: He is well-developed. He is not diaphoretic. HENT:      Head: Normocephalic and atraumatic. Neck:      Vascular: No carotid bruit or JVD. Cardiovascular:      Rate and Rhythm: Normal rate and regular rhythm. Pulses: Intact distal pulses. Heart sounds: Normal heart sounds, S1 normal and S2 normal. No murmur heard. No friction rub. No gallop. Comments: No edema  Pulmonary:      Effort: Pulmonary effort is normal. No respiratory distress. Breath sounds: Normal breath sounds. Abdominal:      General: There is no distension. Palpations: Abdomen is soft. Tenderness: There is no abdominal tenderness. Skin:     General: Skin is warm and dry. Findings: No rash. Neurological:      Mental Status: He is alert and oriented to person, place, and time.    Psychiatric:         Behavior: Behavior normal.

## 2023-11-28 ENCOUNTER — OFFICE VISIT (OUTPATIENT)
Dept: CARDIOLOGY CLINIC | Facility: CLINIC | Age: 83
End: 2023-11-28
Payer: MEDICARE

## 2023-11-28 VITALS
SYSTOLIC BLOOD PRESSURE: 126 MMHG | TEMPERATURE: 96.8 F | HEIGHT: 71 IN | WEIGHT: 185.4 LBS | DIASTOLIC BLOOD PRESSURE: 80 MMHG | BODY MASS INDEX: 25.96 KG/M2 | HEART RATE: 61 BPM | OXYGEN SATURATION: 98 %

## 2023-11-28 DIAGNOSIS — E78.2 MIXED HYPERLIPIDEMIA: ICD-10-CM

## 2023-11-28 DIAGNOSIS — Z86.79 HISTORY OF TACHYCARDIA-BRADYCARDIA SYNDROME: ICD-10-CM

## 2023-11-28 DIAGNOSIS — G25.81 RESTLESS LEG: ICD-10-CM

## 2023-11-28 DIAGNOSIS — E03.8 OTHER SPECIFIED HYPOTHYROIDISM: ICD-10-CM

## 2023-11-28 DIAGNOSIS — K21.9 GASTROESOPHAGEAL REFLUX DISEASE WITHOUT ESOPHAGITIS: ICD-10-CM

## 2023-11-28 DIAGNOSIS — I48.0 PAROXYSMAL ATRIAL FIBRILLATION (HCC): ICD-10-CM

## 2023-11-28 DIAGNOSIS — Z95.0 PRESENCE OF CARDIAC PACEMAKER: ICD-10-CM

## 2023-11-28 DIAGNOSIS — R79.9 ABNORMAL FINDING OF BLOOD CHEMISTRY, UNSPECIFIED: ICD-10-CM

## 2023-11-28 DIAGNOSIS — I34.0 MITRAL VALVE INSUFFICIENCY, UNSPECIFIED ETIOLOGY: ICD-10-CM

## 2023-11-28 DIAGNOSIS — I42.0 DILATED CARDIOMYOPATHY (HCC): Primary | ICD-10-CM

## 2023-11-28 DIAGNOSIS — I77.810 DILATED AORTIC ROOT (HCC): ICD-10-CM

## 2023-11-28 PROCEDURE — 99214 OFFICE O/P EST MOD 30 MIN: CPT | Performed by: NURSE PRACTITIONER

## 2023-11-28 PROCEDURE — 93000 ELECTROCARDIOGRAM COMPLETE: CPT | Performed by: NURSE PRACTITIONER

## 2023-11-28 RX ORDER — ROPINIROLE 1 MG/1
TABLET, FILM COATED ORAL
COMMUNITY
Start: 2023-11-28

## 2023-11-28 NOTE — ASSESSMENT & PLAN NOTE
Lipid panel 5/31/2023: C 189. T 220. H 53. L 92.   Pt remains on simvastatin 40 mg daily  Goal LDL < 100

## 2023-11-28 NOTE — ASSESSMENT & PLAN NOTE
Patient has a history of reduced LVEF. Stage 1A heart failure. He had an abnormal stress test in 2009 and underwent cardiac cath at that time that revealed no obvious coronary disease. Holter monitor in 2019 revealed occasional PAC's and rare PVC's  HUSSEIN in 6/2020 with EF 40-45%  Echocardiogram 2/2/2023 shows LVEF 50-55%. Currently he is on metoprolol succinate 50 mg daily. He has not been started on ACE/ARB secondary to history of hypotension and episodic lightheadedness. He appears euvolemic without diuretics.   Update echocardiogram 2/2024

## 2023-11-28 NOTE — ASSESSMENT & PLAN NOTE
Mild to moderate MR noted on HUSSEIN in 6/2020  Echocardiogram 2/2/2023 shows moderate MR. He currently denies shortness of breath and does not exhibit volume overload. Will repeat echo 2/2024.

## 2023-11-28 NOTE — PATIENT INSTRUCTIONS
Your EKG shows a paced rhythm. No concerning findings on your pacemaker check. Echo due in January or February. If experiencing lightheadedness check BP. Contact us with any concerns. I ordered an iron panel to complete at your convenience.

## 2023-11-28 NOTE — ASSESSMENT & PLAN NOTE
Pacemaker device interrogation on 9/8/2021 reveals 11 episodes of atrial fibrillation, longest episode lasting 3 hours and 58 minutes, which was a new finding. Patient was already on Eliquis 5mg BID for DVT treatment. Advised that we will continue this anticoagulation long-term for stroke prevention. He remains on metoprolol succinate 50 mg daily. Most recent device interrogation 10/10/23 shows no high rate episodes.

## 2023-11-28 NOTE — ASSESSMENT & PLAN NOTE
History of sick sinus syndrome resulting in pacemaker insertion in June of 2009. [facility] took over device management. Device interrogation on 10/14/2021 revealed device at [facility]. Pt underwent device generator replacement by Dr. Vivian Martin on 10/21/21.

## 2023-12-08 ENCOUNTER — APPOINTMENT (OUTPATIENT)
Dept: LAB | Facility: HOSPITAL | Age: 83
End: 2023-12-08
Payer: MEDICARE

## 2023-12-08 DIAGNOSIS — G25.81 RESTLESS LEG: ICD-10-CM

## 2023-12-08 DIAGNOSIS — R79.9 ABNORMAL FINDING OF BLOOD CHEMISTRY, UNSPECIFIED: ICD-10-CM

## 2023-12-08 LAB
FERRITIN SERPL-MCNC: 45 NG/ML (ref 24–336)
IRON SATN MFR SERPL: 29 % (ref 15–50)
IRON SERPL-MCNC: 106 UG/DL (ref 50–212)
TIBC SERPL-MCNC: 364 UG/DL (ref 250–450)
UIBC SERPL-MCNC: 258 UG/DL (ref 155–355)

## 2023-12-08 PROCEDURE — 83550 IRON BINDING TEST: CPT

## 2023-12-08 PROCEDURE — 82728 ASSAY OF FERRITIN: CPT

## 2023-12-08 PROCEDURE — 36415 COLL VENOUS BLD VENIPUNCTURE: CPT

## 2023-12-08 PROCEDURE — 83540 ASSAY OF IRON: CPT

## 2023-12-12 ENCOUNTER — TELEPHONE (OUTPATIENT)
Dept: CARDIOLOGY CLINIC | Facility: CLINIC | Age: 83
End: 2023-12-12

## 2023-12-12 NOTE — TELEPHONE ENCOUNTER
----- Message from Edinson Martell sent at 12/11/2023  5:17 PM EST -----  Please let patient know his iron and ferritin counts are good. Thank you!

## 2024-01-06 ENCOUNTER — APPOINTMENT (EMERGENCY)
Dept: RADIOLOGY | Facility: HOSPITAL | Age: 84
End: 2024-01-06
Payer: MEDICARE

## 2024-01-06 ENCOUNTER — HOSPITAL ENCOUNTER (EMERGENCY)
Facility: HOSPITAL | Age: 84
Discharge: HOME | End: 2024-01-07
Attending: EMERGENCY MEDICINE
Payer: MEDICARE

## 2024-01-06 VITALS
TEMPERATURE: 98.6 F | OXYGEN SATURATION: 96 % | DIASTOLIC BLOOD PRESSURE: 75 MMHG | HEART RATE: 60 BPM | RESPIRATION RATE: 20 BRPM | BODY MASS INDEX: 25.2 KG/M2 | HEIGHT: 71 IN | SYSTOLIC BLOOD PRESSURE: 126 MMHG | WEIGHT: 180 LBS

## 2024-01-06 DIAGNOSIS — J06.9 URI (UPPER RESPIRATORY INFECTION): Primary | ICD-10-CM

## 2024-01-06 LAB
ALBUMIN SERPL BCP-MCNC: 4.2 G/DL (ref 3.5–5)
ALP SERPL-CCNC: 41 U/L (ref 34–104)
ALT SERPL W P-5'-P-CCNC: 24 U/L (ref 7–52)
ANION GAP SERPL CALCULATED.3IONS-SCNC: 6 MMOL/L
AST SERPL W P-5'-P-CCNC: 31 U/L (ref 13–39)
BASOPHILS # BLD AUTO: 0.07 THOUSANDS/ÂΜL (ref 0–0.1)
BASOPHILS NFR BLD AUTO: 1 % (ref 0–1)
BILIRUB SERPL-MCNC: 0.49 MG/DL (ref 0.2–1)
BNP SERPL-MCNC: 87 PG/ML (ref 0–100)
BUN SERPL-MCNC: 25 MG/DL (ref 5–25)
CALCIUM SERPL-MCNC: 8.9 MG/DL (ref 8.4–10.2)
CARDIAC TROPONIN I PNL SERPL HS: 13 NG/L
CHLORIDE SERPL-SCNC: 105 MMOL/L (ref 96–108)
CO2 SERPL-SCNC: 26 MMOL/L (ref 21–32)
CREAT SERPL-MCNC: 0.97 MG/DL (ref 0.6–1.3)
D DIMER PPP FEU-MCNC: 0.41 UG/ML FEU
EOSINOPHIL # BLD AUTO: 0.25 THOUSAND/ÂΜL (ref 0–0.61)
EOSINOPHIL NFR BLD AUTO: 4 % (ref 0–6)
ERYTHROCYTE [DISTWIDTH] IN BLOOD BY AUTOMATED COUNT: 12.9 % (ref 11.6–15.1)
FLUAV RNA RESP QL NAA+PROBE: NEGATIVE
FLUBV RNA RESP QL NAA+PROBE: NEGATIVE
GFR SERPL CREATININE-BSD FRML MDRD: 70 ML/MIN/1.73SQ M
GLUCOSE SERPL-MCNC: 125 MG/DL (ref 65–140)
HCT VFR BLD AUTO: 42.7 % (ref 36.5–49.3)
HGB BLD-MCNC: 13.8 G/DL (ref 12–17)
IMM GRANULOCYTES # BLD AUTO: 0.06 THOUSAND/UL (ref 0–0.2)
IMM GRANULOCYTES NFR BLD AUTO: 1 % (ref 0–2)
LYMPHOCYTES # BLD AUTO: 2.41 THOUSANDS/ÂΜL (ref 0.6–4.47)
LYMPHOCYTES NFR BLD AUTO: 34 % (ref 14–44)
MCH RBC QN AUTO: 30.1 PG (ref 26.8–34.3)
MCHC RBC AUTO-ENTMCNC: 32.3 G/DL (ref 31.4–37.4)
MCV RBC AUTO: 93 FL (ref 82–98)
MONOCYTES # BLD AUTO: 0.67 THOUSAND/ÂΜL (ref 0.17–1.22)
MONOCYTES NFR BLD AUTO: 10 % (ref 4–12)
NEUTROPHILS # BLD AUTO: 3.58 THOUSANDS/ÂΜL (ref 1.85–7.62)
NEUTS SEG NFR BLD AUTO: 50 % (ref 43–75)
NRBC BLD AUTO-RTO: 0 /100 WBCS
PLATELET # BLD AUTO: 216 THOUSANDS/UL (ref 149–390)
PMV BLD AUTO: 10.1 FL (ref 8.9–12.7)
POTASSIUM SERPL-SCNC: 4.4 MMOL/L (ref 3.5–5.3)
PROCALCITONIN SERPL-MCNC: <0.05 NG/ML
PROT SERPL-MCNC: 7.4 G/DL (ref 6.4–8.4)
RBC # BLD AUTO: 4.59 MILLION/UL (ref 3.88–5.62)
RSV RNA RESP QL NAA+PROBE: NEGATIVE
SARS-COV-2 RNA RESP QL NAA+PROBE: NEGATIVE
SODIUM SERPL-SCNC: 137 MMOL/L (ref 135–147)
WBC # BLD AUTO: 7.04 THOUSAND/UL (ref 4.31–10.16)

## 2024-01-06 PROCEDURE — 36415 COLL VENOUS BLD VENIPUNCTURE: CPT | Performed by: EMERGENCY MEDICINE

## 2024-01-06 PROCEDURE — 87040 BLOOD CULTURE FOR BACTERIA: CPT | Performed by: EMERGENCY MEDICINE

## 2024-01-06 PROCEDURE — 99285 EMERGENCY DEPT VISIT HI MDM: CPT

## 2024-01-06 PROCEDURE — 99284 EMERGENCY DEPT VISIT MOD MDM: CPT | Performed by: EMERGENCY MEDICINE

## 2024-01-06 PROCEDURE — 85025 COMPLETE CBC W/AUTO DIFF WBC: CPT | Performed by: EMERGENCY MEDICINE

## 2024-01-06 PROCEDURE — 83880 ASSAY OF NATRIURETIC PEPTIDE: CPT | Performed by: EMERGENCY MEDICINE

## 2024-01-06 PROCEDURE — 85379 FIBRIN DEGRADATION QUANT: CPT | Performed by: EMERGENCY MEDICINE

## 2024-01-06 PROCEDURE — 93005 ELECTROCARDIOGRAM TRACING: CPT

## 2024-01-06 PROCEDURE — 80053 COMPREHEN METABOLIC PANEL: CPT | Performed by: EMERGENCY MEDICINE

## 2024-01-06 PROCEDURE — 84145 PROCALCITONIN (PCT): CPT | Performed by: EMERGENCY MEDICINE

## 2024-01-06 PROCEDURE — 0241U HB NFCT DS VIR RESP RNA 4 TRGT: CPT | Performed by: EMERGENCY MEDICINE

## 2024-01-06 PROCEDURE — 84484 ASSAY OF TROPONIN QUANT: CPT | Performed by: EMERGENCY MEDICINE

## 2024-01-06 PROCEDURE — 71045 X-RAY EXAM CHEST 1 VIEW: CPT

## 2024-01-07 LAB
2HR DELTA HS TROPONIN: -1 NG/L
CARDIAC TROPONIN I PNL SERPL HS: 12 NG/L

## 2024-01-07 PROCEDURE — 36415 COLL VENOUS BLD VENIPUNCTURE: CPT | Performed by: EMERGENCY MEDICINE

## 2024-01-07 PROCEDURE — 84484 ASSAY OF TROPONIN QUANT: CPT | Performed by: EMERGENCY MEDICINE

## 2024-01-07 RX ORDER — AZITHROMYCIN 250 MG/1
250 TABLET, FILM COATED ORAL EVERY 24 HOURS
Qty: 4 TABLET | Refills: 0 | Status: SHIPPED | OUTPATIENT
Start: 2024-01-08 | End: 2024-01-12

## 2024-01-07 RX ORDER — AZITHROMYCIN 250 MG/1
500 TABLET, FILM COATED ORAL ONCE
Status: COMPLETED | OUTPATIENT
Start: 2024-01-07 | End: 2024-01-07

## 2024-01-07 RX ADMIN — AZITHROMYCIN 500 MG: 250 TABLET, FILM COATED ORAL at 01:16

## 2024-01-07 NOTE — DISCHARGE INSTRUCTIONS
Return to the ER immediately for any worsening symptoms. Follow up with your pmd for further evaluation and management.

## 2024-01-07 NOTE — ED PROVIDER NOTES
History  Chief Complaint   Patient presents with    Cough     Pt. Started with cold symptoms last Sunday, pt. [place] yesterday he was feeling better but today her has chest pain when breathing and feels he can't get a deep breathe,  pt. Reports starting with a cough today , covid - at home, hx of rib fx      83 yr old male presents with cold like symptoms that began 1 week ago. Today he states he had chest pain with inspiration and has a non productive cough. He denies any fever. He states that he has no fatigue or dyspnea on exertion. He considers himself to be active and rides an exercise bike weekly. He is concerned because he has a leaky valve and has been advised that if he gets sob he should seek medical attention.         Prior to Admission Medications   Prescriptions Last Dose Informant Patient Reported? Taking?   Cholecalciferol 50 MCG (2000 UT) CAPS 1/6/2024 Self Yes Yes   Sig: Take by mouth   Coenzyme Q10 (CO Q 10 PO) 1/6/2024 Self Yes Yes   Sig: Take by mouth   Cyanocobalamin (VITAMIN B 12 PO) 1/6/2024 Self Yes Yes   Sig: Take by mouth   Eliquis 5 MG 1/6/2024  No Yes   Sig: Take 1 tablet (5 mg total) by mouth 2 (two) times a day   Glucosamine-Chondroitin 0331-7995 MG/30ML LIQD 1/6/2024  Yes Yes   Sig: Take by mouth   Multiple Vitamins-Minerals (PRESERVISION AREDS 2+MULTI VIT PO) 1/6/2024  Yes Yes   Sig: Take by mouth   levothyroxine 25 mcg tablet 1/6/2024 Self Yes Yes   metoprolol succinate (TOPROL-XL) 50 mg 24 hr tablet 1/6/2024  No Yes   Sig: Take 1 tablet (50 mg total) by mouth daily   pantoprazole (PROTONIX) 40 mg tablet 1/6/2024  Yes Yes   Sig: Take 40 mg by mouth daily   rOPINIRole (REQUIP) 1 mg tablet 1/6/2024  Yes Yes   simvastatin (ZOCOR) 40 mg tablet 1/6/2024 Self Yes Yes   Sig: Take 40 mg by mouth daily at bedtime       Facility-Administered Medications: None       Past Medical History:   Diagnosis Date    Bladder cancer (HCC)     Dilated aortic root (HCC)     Dilated cardiomyopathy (HCC)      DVT (deep venous thrombosis) ([facility])     GERD (gastroesophageal reflux disease)     Hyperlipidemia     Mitral regurgitation     Pacemaker     Pleural effusion     Rib fractures     SSS (sick sinus syndrome) ([facility])        Past Surgical History:   Procedure Laterality Date    BLADDER TUMOR EXCISION      CARDIAC ELECTROPHYSIOLOGY PROCEDURE N/A 10/21/2021    PPM generator change - dual. Surgeon: Edy Hyde MD    CARDIAC PACEMAKER PLACEMENT  06/09/2009 2009, generator replacement 10/2021    HERNIA REPAIR      X2    THORACOSCOPY VIDEO ASSISTED SURGERY (VATS) Left 11/21/2020    THORACOSCOPY VIDEO ASSISTED SURGERY (VATS), washout of hemothorax, decortication. Surgeon: Alex Eduardo MD;  Location: [facility];  Service: Thoracic    TONSILLECTOMY         Family History   Problem Relation Age of Onset    Lung cancer Mother     Colon cancer Father     COPD Sister      I have reviewed and agree with the history as documented.    E-Cigarette/Vaping    E-Cigarette Use Never User      E-Cigarette/Vaping Substances    Nicotine No     THC No     CBD No     Flavoring No      Social History     Tobacco Use    Smoking status: Never     Passive exposure: Never    Smokeless tobacco: Never   Vaping Use    Vaping status: Never Used   Substance Use Topics    Alcohol use: Not Currently    Drug use: Not Currently       Review of Systems   Constitutional:  Negative for chills, fatigue and fever.   HENT:  Negative for ear pain and sore throat.    Eyes:  Negative for pain and visual disturbance.   Respiratory:  Positive for cough. Negative for shortness of breath.    Cardiovascular:  Positive for chest pain. Negative for palpitations.   Gastrointestinal:  Negative for abdominal pain and vomiting.   Genitourinary:  Negative for dysuria and hematuria.   Musculoskeletal:  Negative for arthralgias and back pain.   Skin:  Negative for color change and rash.   Neurological:  Negative for seizures and syncope.   All other systems reviewed and  are negative.      Physical Exam  Physical Exam  Vitals and nursing note reviewed.   Constitutional:       General: He is not in acute distress.     Appearance: Normal appearance. He is well-developed and normal weight. He is not ill-appearing.   HENT:      Head: Normocephalic and atraumatic.      Right Ear: External ear normal.      Left Ear: External ear normal.      Nose: No congestion or rhinorrhea.      Mouth/Throat:      Mouth: Mucous membranes are moist.   Eyes:      Extraocular Movements: Extraocular movements intact.      Conjunctiva/sclera: Conjunctivae normal.   Cardiovascular:      Rate and Rhythm: Normal rate and regular rhythm.      Pulses: Normal pulses.      Heart sounds: Normal heart sounds. No murmur heard.  Pulmonary:      Effort: Pulmonary effort is normal. No respiratory distress.      Breath sounds: Normal breath sounds.   Abdominal:      General: Abdomen is flat. There is no distension.      Palpations: Abdomen is soft. There is no mass.      Tenderness: There is no abdominal tenderness. There is no guarding or rebound.      Hernia: No hernia is present.   Musculoskeletal:         General: No swelling, tenderness, deformity or signs of injury. Normal range of motion.      Cervical back: Normal range of motion and neck supple. No rigidity or tenderness.      Right lower leg: No edema.      Left lower leg: No edema.   Lymphadenopathy:      Cervical: No cervical adenopathy.   Skin:     General: Skin is warm and dry.      Capillary Refill: Capillary refill takes less than 2 seconds.      Coloration: Skin is not jaundiced or pale.      Findings: No bruising, erythema, lesion or rash.   Neurological:      General: No focal deficit present.      Mental Status: He is alert and oriented to person, place, and time. Mental status is at baseline.      Cranial Nerves: No cranial nerve deficit.      Sensory: No sensory deficit.      Motor: No weakness.      Coordination: Coordination normal.      Gait: Gait  normal.      Deep Tendon Reflexes: Reflexes normal.   Psychiatric:         Mood and Affect: Mood normal.         Behavior: Behavior normal.         Vital Signs  ED Triage Vitals [01/06/24 2200]   Temperature Pulse Respirations Blood Pressure SpO2   98.6 °F (37 °C) 69 20 (!) 183/93 95 %      Temp Source Heart Rate Source Patient Position - Orthostatic VS BP Location FiO2 (%)   Temporal Monitor Sitting Left arm --      Pain Score       No Pain           Vitals:    01/06/24 2200 01/06/24 2337   BP: (!) 183/93 126/75   Pulse: 69 60   Patient Position - Orthostatic VS: Sitting Sitting         Visual Acuity      ED Medications  Medications   azithromycin (ZITHROMAX) tablet 500 mg (500 mg Oral Given 1/7/24 0116)       Diagnostic Studies  Results Reviewed       Procedure Component Value Units Date/Time    HS Troponin I 2hr [[id number]]  (Normal) Collected: 01/07/24 0037    Lab Status: Final result Specimen: Blood from Arm, Right Updated: 01/07/24 0115     hs TnI 2hr 12 ng/L      Delta 2hr hsTnI -1 ng/L     Procalcitonin [[id number]]  (Normal) Collected: 01/06/24 2235    Lab Status: Final result Specimen: Blood from Arm, Right Updated: 01/06/24 2350     Procalcitonin <0.05 ng/ml     FLU/RSV/COVID - if FLU/RSV clinically relevant [[id number]]  (Normal) Collected: 01/06/24 2235    Lab Status: Final result Specimen: Nares from Nose Updated: 01/06/24 2336     SARS-CoV-2 Negative     INFLUENZA A PCR Negative     INFLUENZA B PCR Negative     RSV PCR Negative    Narrative:      FOR PEDIATRIC PATIENTS - copy/paste COVID Guidelines URL to browser: [email or web address]    SARS-CoV-2 assay is a Nucleic Acid Amplification assay intended for the  qualitative detection of nucleic acid from SARS-CoV-2 in nasopharyngeal  swabs. Results are for the presumptive identification of SARS-CoV-2 RNA.    Positive results are indicative of infection with SARS-CoV-2, the virus  causing COVID-19, but  do not rule out bacterial infection or co-infection  with other viruses. Laboratories within the [place] and its  territories are required to report all positive results to the appropriate  public health authorities. Negative results do not preclude SARS-CoV-2  infection and should not be used as the sole basis for treatment or other  patient management decisions. Negative results must be combined with  clinical observations, patient history, and epidemiological information.  This test has not been FDA cleared or approved.    This test has been authorized by FDA under an Emergency Use Authorization  (EUA). This test is only authorized for the duration of time the  declaration that circumstances exist justifying the authorization of the  emergency use of an in vitro diagnostic tests for detection of SARS-CoV-2  virus and/or diagnosis of COVID-19 infection under section 564(b)(1) of  the Act, 21 U.S.C. 360bbb-3(b)(1), unless the authorization is terminated  or revoked sooner. The test has been validated but independent review by FDA  and CLIA is pending.    Test performed using [employer] GeneXpert: This RT-PCR assay targets N2,  a region unique to SARS-CoV-2. A conserved region in the E-gene was chosen  for pan-Sarbecovirus detection which includes SARS-CoV-2.    According to CMS-2020-01-R, this platform meets the definition of high-throughput technology.    B-Type Natriuretic Peptide(BNP) [[id number]]  (Normal) Collected: 01/06/24 2235    Lab Status: Final result Specimen: Blood from Arm, Right Updated: 01/06/24 2330     BNP 87 pg/mL     Comprehensive metabolic panel [[id number]] Collected: 01/06/24 2235    Lab Status: Final result Specimen: Blood from Arm, Right Updated: 01/06/24 2325     Sodium 137 mmol/L      Potassium 4.4 mmol/L      Chloride 105 mmol/L      CO2 26 mmol/L      ANION GAP 6 mmol/L      BUN 25 mg/dL      Creatinine 0.97 mg/dL      Glucose 125 mg/dL      Calcium 8.9 mg/dL      AST 31 U/L      ALT  24 U/L      Alkaline Phosphatase 41 U/L      Total Protein 7.4 g/dL      Albumin 4.2 g/dL      Total Bilirubin 0.49 mg/dL      eGFR 70 ml/min/1.73sq m     Narrative:      National Kidney Disease Foundation guidelines for Chronic Kidney Disease (CKD):     Stage 1 with normal or high GFR (GFR > 90 mL/min/1.73 square meters)    Stage 2 Mild CKD (GFR = 60-89 mL/min/1.73 square meters)    Stage 3A Moderate CKD (GFR = 45-59 mL/min/1.73 square meters)    Stage 3B Moderate CKD (GFR = 30-44 mL/min/1.73 square meters)    Stage 4 Severe CKD (GFR = 15-29 mL/min/1.73 square meters)    Stage 5 End Stage CKD (GFR <15 mL/min/1.73 square meters)  Note: GFR calculation is accurate only with a steady state creatinine    HS Troponin 0hr (reflex protocol) [[id number]]  (Normal) Collected: 01/06/24 2235    Lab Status: Final result Specimen: Blood from Arm, Right Updated: 01/06/24 2324     hs TnI 0hr 13 ng/L     D-dimer, quantitative [[id number]]  (Normal) Collected: 01/06/24 2235    Lab Status: Final result Specimen: Blood from Arm, Right Updated: 01/06/24 2314     D-Dimer, Quant 0.41 ug/ml FEU     Narrative:      In the evaluation for possible pulmonary embolism, in the appropriate (Well's Score of 4 or less) patient, the age adjusted d-dimer cutoff for this patient can be calculated as:    Age x 0.01 (in ug/mL) for Age-adjusted D-dimer exclusion threshold for a patient over 50 years.    CBC and differential [[id number]] Collected: 01/06/24 2235    Lab Status: Final result Specimen: Blood from Arm, Right Updated: 01/06/24 2303     WBC 7.04 Thousand/uL      RBC 4.59 Million/uL      Hemoglobin 13.8 g/dL      Hematocrit 42.7 %      MCV 93 fL      MCH 30.1 pg      MCHC 32.3 g/dL      RDW 12.9 %      MPV 10.1 fL      Platelets 216 Thousands/uL      nRBC 0 /100 WBCs      Neutrophils Relative 50 %      Immat GRANS % 1 %      Lymphocytes Relative 34 %      Monocytes Relative 10 %      Eosinophils Relative 4 %      Basophils Relative 1 %       Neutrophils Absolute 3.58 Thousands/µL      Immature Grans Absolute 0.06 Thousand/uL      Lymphocytes Absolute 2.41 Thousands/µL      Monocytes Absolute 0.67 Thousand/µL      Eosinophils Absolute 0.25 Thousand/µL      Basophils Absolute 0.07 Thousands/µL     Blood culture #2 [[id number]] Collected: 01/06/24 2235    Lab Status: In process Specimen: Blood from Arm, Right Updated: 01/06/24 2256    Blood culture #1 [[id number]] Collected: 01/06/24 2238    Lab Status: In process Specimen: Blood from Arm, Left Updated: 01/06/24 2256                   XR chest 1 view portable    (Results Pending)              Procedures  ECG 12 Lead Documentation Only    Date/Time: 1/6/2024 11:24 PM    Performed by: Faith Sharif DO  Authorized by: Faith Sharif DO    Indications / Diagnosis:  Cough chest pain  Patient location:  ED  Previous ECG:     Previous ECG:  Unavailable    Comparison to cardiac monitor: Yes    Interpretation:     Interpretation: abnormal    Rate:     ECG rate:  63    ECG rate assessment: normal    Rhythm:     Rhythm: paced    Pacing:     Capture:  Complete    Type of pacing:  AV  QRS:     QRS intervals:  Wide           ED Course                               SBIRT 20yo+      Flowsheet Row Most Recent Value   Initial Alcohol Screen: US AUDIT-C     1. How often do you have a drink containing alcohol? 0 Filed at: 01/06/2024 2220   2. How many drinks containing alcohol do you have on a typical day you are drinking?  0 Filed at: 01/06/2024 2220   3a. Male UNDER 65: How often do you have five or more drinks on one occasion? 0 Filed at: 01/06/2024 2220   3b. FEMALE Any Age, or MALE 65+: How often do you have 4 or more drinks on one occassion? 0 Filed at: 01/06/2024 2220   Audit-C Score 0 Filed at: 01/06/2024 2220   DEUCE: How many times in the past year have you...    Used an illegal drug or used a prescription medication for non-medical reasons? Never Filed at: 01/06/2024 2220                       Medical Decision Making  Ddx: pneumonia bronchitis viral syndrome    Amount and/or Complexity of Data Reviewed  Labs: ordered.  Radiology: ordered.    Risk  Prescription drug management.             Disposition  Final diagnoses:   URI (upper respiratory infection)     Time reflects when diagnosis was documented in both MDM as applicable and the Disposition within this note       Time User Action Codes Description Comment    1/7/2024  1:04 AM ChanteMilanannmarie Fonseca [J06.9] URI (upper respiratory infection)           ED Disposition       ED Disposition   Discharge    Condition   Stable    Date/Time   Sun Jan 7, 2024 0104    Comment   Madhu Bach discharge to home/self care.                   Follow-up Information       Follow up With Specialties Details Why Contact Info    Alex Veras MD Family Medicine In 1 week  [address]  [address]  [facility] [id number] [phone]              Discharge Medication List as of 1/7/2024  1:07 AM        START taking these medications    Details   azithromycin (ZITHROMAX) 250 mg tablet Take 1 tablet (250 mg total) by mouth every 24 hours for 4 days Do not start before January 8, 2024., Starting Mon 1/8/2024, Until Fri 1/12/2024, Normal           CONTINUE these medications which have NOT CHANGED    Details   Cholecalciferol 50 MCG (2000 UT) CAPS Take by mouth, Starting Fri 6/4/2021, Historical Med      Coenzyme Q10 (CO Q 10 PO) Take by mouth, Historical Med      Cyanocobalamin (VITAMIN B 12 PO) Take by mouth, Historical Med      Eliquis 5 MG Take 1 tablet (5 mg total) by mouth 2 (two) times a day, Starting Fri 10/29/2021, Normal      Glucosamine-Chondroitin 9123-4655 MG/30ML LIQD Take by mouth, Historical Med      levothyroxine 25 mcg tablet Starting Wed 8/11/2021, Historical Med      metoprolol succinate (TOPROL-XL) 50 mg 24 hr tablet Take 1 tablet (50 mg total) by mouth daily, Starting Fri 1/13/2023, Normal      Multiple Vitamins-Minerals (PRESERVISION  AREDS 2+MULTI VIT PO) Take by mouth, Historical Med      pantoprazole (PROTONIX) 40 mg tablet Take 40 mg by mouth daily, Starting Fri 2/3/2023, Historical Med      rOPINIRole (REQUIP) 1 mg tablet Historical Med      simvastatin (ZOCOR) 40 mg tablet Take 40 mg by mouth daily at bedtime , Starting Wed 1/15/2020, Historical Med             No discharge procedures on file.    PDMP Review       None            ED Provider  Electronically Signed by             Faith Sharif DO  01/07/24 0592

## 2024-01-08 LAB
ATRIAL RATE: 63 BPM
P AXIS: -9 DEGREES
PR INTERVAL: 176 MS
QRS AXIS: -72 DEGREES
QRSD INTERVAL: 186 MS
QT INTERVAL: 490 MS
QTC INTERVAL: 501 MS
T WAVE AXIS: 76 DEGREES
VENTRICULAR RATE: 63 BPM

## 2024-01-11 ENCOUNTER — REMOTE DEVICE CLINIC VISIT (OUTPATIENT)
Dept: CARDIOLOGY CLINIC | Facility: CLINIC | Age: 84
End: 2024-01-11
Payer: MEDICARE

## 2024-01-11 DIAGNOSIS — Z95.0 CARDIAC PACEMAKER IN SITU: Primary | ICD-10-CM

## 2024-01-11 PROCEDURE — 93294 REM INTERROG EVL PM/LDLS PM: CPT | Performed by: INTERNAL MEDICINE

## 2024-01-11 PROCEDURE — 93296 REM INTERROG EVL PM/IDS: CPT | Performed by: INTERNAL MEDICINE

## 2024-01-11 NOTE — PROGRESS NOTES
"Results for orders placed or performed in visit on 01/11/24   Cardiac EP device report    Narrative    MDT D-PM/ACTIVE SYSTEM IS MRI CONDITIONAL  CARELINK TRANSMISSION: BATTERY STATUS \"10 YRS.\" AP 89%  100%. ALL AVAILABLE LEAD PARAMETERS WITHIN NORMAL LIMITS. 1 AT/AF NOTED; 0% BURDEN; <1 MIN. PT ON ELIQUIS & METO SUCC. AVAIL EGRAM PRESENTS AS PAF VS PAT. NORMAL DEVICE FUNCTION. NC         "

## 2024-01-12 LAB
BACTERIA BLD CULT: NORMAL
BACTERIA BLD CULT: NORMAL

## 2024-02-05 ENCOUNTER — HOSPITAL ENCOUNTER (OUTPATIENT)
Dept: NON INVASIVE DIAGNOSTICS | Facility: HOSPITAL | Age: 84
Discharge: HOME | End: 2024-02-05
Payer: MEDICARE

## 2024-02-05 ENCOUNTER — TELEPHONE (OUTPATIENT)
Dept: CARDIOLOGY CLINIC | Facility: CLINIC | Age: 84
End: 2024-02-05

## 2024-02-05 VITALS
DIASTOLIC BLOOD PRESSURE: 75 MMHG | SYSTOLIC BLOOD PRESSURE: 126 MMHG | BODY MASS INDEX: 25.2 KG/M2 | WEIGHT: 180 LBS | HEIGHT: 71 IN

## 2024-02-05 DIAGNOSIS — I42.0 DILATED CARDIOMYOPATHY (HCC): ICD-10-CM

## 2024-02-05 LAB
AORTIC ROOT: 3.4 CM
APICAL FOUR CHAMBER EJECTION FRACTION: 44 %
BSA FOR ECHO PROCEDURE: 2.02 M2
E WAVE DECELERATION TIME: 206 MS
E/A RATIO: 0.71
FRACTIONAL SHORTENING: 32 (ref 28–44)
INTERVENTRICULAR SEPTUM IN DIASTOLE (PARASTERNAL SHORT AXIS VIEW): 1.6 CM
INTERVENTRICULAR SEPTUM: 1.6 CM (ref 0.6–1.1)
IVC: 15 MM
LAAS-AP2: 16.4 CM2
LAAS-AP4: 16.5 CM2
LEFT ATRIUM SIZE: 4 CM
LEFT ATRIUM VOLUME (MOD BIPLANE): 54 ML
LEFT ATRIUM VOLUME INDEX (MOD BIPLANE): 26.7 ML/M2
LEFT INTERNAL DIMENSION IN SYSTOLE: 3.6 CM (ref 2.1–4)
LEFT VENTRICULAR INTERNAL DIMENSION IN DIASTOLE: 5.3 CM (ref 3.5–6)
LEFT VENTRICULAR POSTERIOR WALL IN END DIASTOLE: 1.4 CM
LEFT VENTRICULAR STROKE VOLUME: 80 ML
LVSV (TEICH): 80 ML
MV E'TISSUE VEL-SEP: 6 CM/S
MV PEAK A VEL: 0.75 M/S
MV PEAK E VEL: 53 CM/S
MV STENOSIS PRESSURE HALF TIME: 60 MS
MV VALVE AREA P 1/2 METHOD: 3.67
PA SYSTOLIC PRESSURE: 36 MMHG
RA PRESSURE ESTIMATED: 3 MMHG
RIGHT ATRIUM AREA SYSTOLE A4C: 16.5 CM2
RIGHT VENTRICLE ID DIMENSION: 3.1 CM
RV PSP: 33 MMHG
SL CV LEFT ATRIUM LENGTH A2C: 4.1 CM
SL CV PED ECHO LEFT VENTRICLE DIASTOLIC VOLUME (MOD BIPLANE) 2D: 136 ML
SL CV PED ECHO LEFT VENTRICLE SYSTOLIC VOLUME (MOD BIPLANE) 2D: 56 ML
TR MAX PG: 30 MMHG
TR PEAK VELOCITY: 2.7 M/S
TRICUSPID ANNULAR PLANE SYSTOLIC EXCURSION: 1.9 CM
TRICUSPID VALVE PEAK REGURGITATION VELOCITY: 2.73 M/S

## 2024-02-05 PROCEDURE — 93306 TTE W/DOPPLER COMPLETE: CPT

## 2024-02-05 NOTE — TELEPHONE ENCOUNTER
----- Message from IVY Bear sent at 2/5/2024  2:26 PM EST -----  Please let Madhu know that his echocardiogram shows normal heart function with no significant change from last year. We will review in detail at his next visit. (Wife's echo was done today as well).

## 2024-02-29 RX ORDER — AMOXICILLIN 500 MG/1
500 CAPSULE ORAL 3 TIMES DAILY
COMMUNITY
Start: 2024-01-15 | End: 2024-03-04 | Stop reason: ALTCHOICE

## 2024-02-29 RX ORDER — PREDNISONE 20 MG/1
TABLET ORAL
COMMUNITY
Start: 2024-01-15 | End: 2024-03-04 | Stop reason: ALTCHOICE

## 2024-02-29 RX ORDER — FOLIC ACID 1 MG/1
1000 TABLET ORAL DAILY
COMMUNITY
Start: 2024-01-30 | End: 2024-03-04 | Stop reason: ALTCHOICE

## 2024-02-29 RX ORDER — BENZONATATE 100 MG/1
CAPSULE ORAL
COMMUNITY
Start: 2024-01-15 | End: 2024-03-04 | Stop reason: ALTCHOICE

## 2024-03-04 ENCOUNTER — OFFICE VISIT (OUTPATIENT)
Dept: CARDIOLOGY CLINIC | Facility: CLINIC | Age: 84
End: 2024-03-04
Payer: MEDICARE

## 2024-03-04 VITALS
HEART RATE: 64 BPM | TEMPERATURE: 97.3 F | WEIGHT: 186.2 LBS | SYSTOLIC BLOOD PRESSURE: 110 MMHG | HEIGHT: 71 IN | BODY MASS INDEX: 26.07 KG/M2 | DIASTOLIC BLOOD PRESSURE: 70 MMHG | OXYGEN SATURATION: 96 %

## 2024-03-04 DIAGNOSIS — I48.0 PAROXYSMAL ATRIAL FIBRILLATION (HCC): ICD-10-CM

## 2024-03-04 DIAGNOSIS — Z95.0 PRESENCE OF CARDIAC PACEMAKER: ICD-10-CM

## 2024-03-04 DIAGNOSIS — I42.0 DILATED CARDIOMYOPATHY (HCC): Primary | ICD-10-CM

## 2024-03-04 DIAGNOSIS — E03.8 OTHER SPECIFIED HYPOTHYROIDISM: ICD-10-CM

## 2024-03-04 DIAGNOSIS — Z86.79 HISTORY OF TACHYCARDIA-BRADYCARDIA SYNDROME: ICD-10-CM

## 2024-03-04 DIAGNOSIS — I77.810 DILATED AORTIC ROOT (HCC): ICD-10-CM

## 2024-03-04 DIAGNOSIS — E78.2 MIXED HYPERLIPIDEMIA: ICD-10-CM

## 2024-03-04 DIAGNOSIS — I34.0 NONRHEUMATIC MITRAL VALVE REGURGITATION: ICD-10-CM

## 2024-03-04 PROCEDURE — 99214 OFFICE O/P EST MOD 30 MIN: CPT | Performed by: NURSE PRACTITIONER

## 2024-03-04 NOTE — ASSESSMENT & PLAN NOTE
Patient has a history of reduced LVEF.  Stage 1A heart failure.  He had an abnormal stress test in 2009 and underwent cardiac cath at that time that revealed no obvious coronary disease.  Holter monitor in 2019 revealed occasional PAC's and rare PVC's  HUSSEIN in 6/2020 with EF 40-45%  Echocardiogram 2/2/2023 shows LVEF 50-55%.  Echo 2/4/2024 shows LVEF 50-55%.  Currently he is on metoprolol succinate 50 mg daily.  He has not been started on ACE/ARB secondary to history of hypotension and episodic lightheadedness.  He appears euvolemic without diuretics.

## 2024-03-04 NOTE — ASSESSMENT & PLAN NOTE
Aortic root has measured 3.8-4cm on prior imaging  Echocardiogram 2/2/2023 with aortic root 3.9 cm.  Aortic root 3.6 cm on 2/4/2024 echo.  Will monitor in 1 year's time.

## 2024-03-04 NOTE — ASSESSMENT & PLAN NOTE
Pacemaker device interrogation on 9/8/2021 reveals 11 episodes of atrial fibrillation, longest episode lasting 3 hours and 58 minutes, which was a new finding.  Patient was already on Eliquis 5mg BID for DVT treatment.   Advised that we will continue this anticoagulation long-term for stroke prevention.  He remains on metoprolol succinate 50 mg daily.  Most recent device interrogation 1/2024 shows < 1% AF burden with no high rate episodes.

## 2024-03-04 NOTE — PROGRESS NOTES
Cardiology Follow Up    Madhu Bach  1940  [id number]  [facility] [place] [address]  [address] FLO[address]  [facility] [address] [phone]    Madhu presents today for routine follow up of dilated cardiomyopathy, paroxysmal atrial fibrillation, sick sinus syndrome S/P pacemaker placement.     1. Dilated cardiomyopathy (HCC)  Assessment & Plan:  Patient has a history of reduced LVEF.  Stage 1A heart failure.  He had an abnormal stress test in 2009 and underwent cardiac cath at that time that revealed no obvious coronary disease.  Holter monitor in 2019 revealed occasional PAC's and rare PVC's  HUSSEIN in 6/2020 with EF 40-45%  Echocardiogram 2/2/2023 shows LVEF 50-55%.  Echo 2/4/2024 shows LVEF 50-55%.  Currently he is on metoprolol succinate 50 mg daily.  He has not been started on ACE/ARB secondary to history of hypotension and episodic lightheadedness.  He appears euvolemic without diuretics.      2. Paroxysmal atrial fibrillation (HCC)  Assessment & Plan:  Pacemaker device interrogation on 9/8/2021 reveals 11 episodes of atrial fibrillation, longest episode lasting 3 hours and 58 minutes, which was a new finding.  Patient was already on Eliquis 5mg BID for DVT treatment.   Advised that we will continue this anticoagulation long-term for stroke prevention.  He remains on metoprolol succinate 50 mg daily.  Most recent device interrogation 1/2024 shows < 1% AF burden with no high rate episodes.      3. Nonrheumatic mitral valve regurgitation  Assessment & Plan:  Mild to moderate MR noted on HUSSEIN in 6/2020  Echocardiogram 2/2/2023 shows moderate MR.  Mod MR on 2/4/2024 echo.  He currently denies shortness of breath and does not exhibit volume overload.  Repeat in 1 year's time.        4. Dilated aortic root (HCC)  Assessment & Plan:  Aortic root has measured 3.8-4cm on prior imaging  Echocardiogram 2/2/2023 with aortic root 3.9 cm.  Aortic root 3.6 cm on 2/4/2024  echo.  Will monitor in 1 year's time.      5. History of tachycardia-bradycardia syndrome  Assessment & Plan:  History of sick sinus syndrome resulting in pacemaker insertion in June of 2009.  [facility] took over device management.  Device interrogation on 10/14/2021 revealed device at [facility].  Pt underwent device generator replacement by Dr. Hyde on 10/21/21.       6. Presence of cardiac pacemaker  Assessment & Plan:  See discussion under tachy-cinda syndrome      7. Mixed hyperlipidemia  Assessment & Plan:  Lipid panel 5/31/2023: C 189. T 220. H 53. L 92.  Pt remains on simvastatin 40 mg daily  Goal LDL < 100  Repeat lipid panel spring 2024  Given c/o fatigue with exertion will trial a 2 week statin holiday and assess response. Consider alternate statin.      8. Other specified hypothyroidism  Assessment & Plan:  Managed by PCP  Goal TSH 1-3  Remains on levothyroxine 25 mcg daily         SOL Leung has a past medical history of sick sinus syndrome requiring PPM in 6/2009, dilated cardiomyopathy, fall with rib fracture and resulting hemothorax in 10/2020, and subsequent DVT on Eliquis.     He was initially following with Dr. Morfin at [facility]. He transferred to [facility] in spring 2021.     He met with Dr. Calderón on 8/27/2021 for consultation. His pacemaker device management was transitioned to [facility] at that time.      He followed up with me on 9/16/2021 after evaluation at [facility] for left sided chest pain. Cardiac work up was unrevealing. He denied any recurrent chest pain at follow up. At that time his device interrogation revealed 4 episodes of NSVT and 11 episodes of atrial fibrillation with the longest episode lasting 3 hrs and 58 minutes. Metoprolol succinate was initiated. He was advised that his Eliquis will continue long-term for stroke prevention. He was asymptomatic with the episodes.     His device interrogation on 10/14/2021 revealed his pacemaker was at [facility]. He met with  Dr. Hyde for consultation on that date. He underwent pacemaker generator replacement on 10/21/2021 at [facility] by Dr. Hyde.     He followed up most recently with Dr. Calderón on 2/22/2023. His device interrogation 1/10/23 showed 24 episodes of NSVT, longest 10 beats. His metoprolol succinate was increased to 50 mg daily. An echocardiogram and stress test were ordered.     Echocardiogram 2/6/2023 showed LVEF 50-55% with mild-mod LVH, mod MR.     Exercise nuclear stress test 3/21/2023 showed no perfused defects with Gated EF 40%.     He followed up most recently on 11/28/2023 at which time his ECG showed a ventricular paced rhythm. He had been diagnosed with basal cell carcinoma on his cheek by his dermatologist. He reported positional lightheadeness but no syncope/near syncope. He denied bleeding issues. He was seeing Dr. Zapata for restless let and Requip was increased from 0.5 mg to 1 mg. He was biking regularly without issues. No high rates on his PPM interrogation.  I ordered an iron panel to check for iron deficiency due to restless leg symptoms and AC use. Iron returned WNL.     3/4/2024: Jai presents for routine follow up accompanied by his spouse. He states he feels well for his age. He continues to bike regularly. He does notice increased fatigue after he finishes exercise. His wife comments that he continues with occasional positional lightheadedness. The most prominent episode was recently when standing after eating at a restaurant he became very lightheaded but this resolved within seconds. No near syncope/syncope. He admits he does not drink enough water. No chest discomfort, shortness of breath or dyspnea on exertion. Some muscle fatigue with exercise. No palpitations. No edema/orthopnea. No bleeding issues. He was in the ER 1/6/24 with flu like symptoms. Blood work, including HS trop x 2 and BNP were WNL. We reviewed his echocardiogram completed 2/5/2024 in detail which shows no  significant change from 2023. He is scheduled later this month for additional excision of basal cell carcinoma from his cheek. He continues with c/o restless legs and Requip was increased to 1.5 mg.    Medical Problems       Problem List       Dilated cardiomyopathy (HCC)    Paroxysmal atrial fibrillation (HCC)    MR (mitral regurgitation)    Dilated aortic root (HCC)    History of tachycardia-bradycardia syndrome    Presence of cardiac pacemaker    Hyperlipidemia    Non-seasonal allergic rhinitis    Closed fracture of multiple ribs of left side with routine healing    Bladder tumor    Normocytic anemia    Chest pain    Premature atrial contractions    History of venous thromboembolism    Other specified hypothyroidism    Elevated serum creatinine    BPH (benign prostatic hyperplasia)    Gastroesophageal reflux disease        Past Medical History:   Diagnosis Date    Bladder cancer (HCC)     Dilated aortic root (HCC)     Dilated cardiomyopathy (HCC)     DVT (deep venous thrombosis) (HCC)     GERD (gastroesophageal reflux disease)     Hyperlipidemia     Mitral regurgitation     Pacemaker     Pleural effusion     Rib fractures     SSS (sick sinus syndrome) (HCC)      Social History     Socioeconomic History    Marital status: /Civil Union     Spouse name: Not on file    Number of children: Not on file    Years of education: Not on file    Highest education level: Not on file   Occupational History    Not on file   Tobacco Use    Smoking status: Never     Passive exposure: Never    Smokeless tobacco: Never   Vaping Use    Vaping status: Never Used   Substance and Sexual Activity    Alcohol use: Not Currently    Drug use: Not Currently    Sexual activity: Not Currently   Other Topics Concern    Not on file   Social History Narrative    Not on file     Social Determinants of Health     Financial Resource Strain: Not on file   Food Insecurity: Not on file   Transportation Needs: Not on file   Physical Activity: Not  on file   Stress: Not on file   Social Connections: Not on file   Intimate Partner Violence: Not on file   Housing Stability: Not on file      Family History   Problem Relation Age of Onset    Lung cancer Mother     Colon cancer Father     COPD Sister      Past Surgical History:   Procedure Laterality Date    BLADDER TUMOR EXCISION      CARDIAC ELECTROPHYSIOLOGY PROCEDURE N/A 10/21/2021    PPM generator change - dual. Surgeon: Edy Hyde MD    CARDIAC PACEMAKER PLACEMENT  06/09/2009 2009, generator replacement 10/2021    HERNIA REPAIR      X2    THORACOSCOPY VIDEO ASSISTED SURGERY (VATS) Left 11/21/2020    THORACOSCOPY VIDEO ASSISTED SURGERY (VATS), washout of hemothorax, decortication. Surgeon: Alex Eduardo MD;  Location: [facility];  Service: Thoracic    TONSILLECTOMY         Current Outpatient Medications:     Cholecalciferol 50 MCG (2000 UT) CAPS, Take by mouth, Disp: , Rfl:     Coenzyme Q10 (CO Q 10 PO), Take by mouth, Disp: , Rfl:     Cyanocobalamin (VITAMIN B 12 PO), Take by mouth, Disp: , Rfl:     Eliquis 5 MG, Take 1 tablet (5 mg total) by mouth 2 (two) times a day, Disp: 180 tablet, Rfl: 3    Glucosamine-Chondroitin 8074-1776 MG/30ML LIQD, Take by mouth, Disp: , Rfl:     levothyroxine 25 mcg tablet, , Disp: , Rfl:     metoprolol succinate (TOPROL-XL) 50 mg 24 hr tablet, Take 1 tablet (50 mg total) by mouth daily, Disp: 90 tablet, Rfl: 3    pantoprazole (PROTONIX) 40 mg tablet, Take 40 mg by mouth daily, Disp: , Rfl:     rOPINIRole (REQUIP) 1 mg tablet, , Disp: , Rfl:     simvastatin (ZOCOR) 40 mg tablet, Take 40 mg by mouth daily at bedtime , Disp: , Rfl:   Allergies   Allergen Reactions    Penicillins Anaphylaxis and Hives       Labs:     Chemistry        Component Value Date/Time    K 4.4 01/06/2024 2235    K 4.3 11/11/2020 0321     01/06/2024 2235     11/11/2020 0321    CO2 26 01/06/2024 2235    CO2 28 11/11/2020 0321    BUN 25 01/06/2024 2235    BUN 24 (H) 11/11/2020 0321     BUN 24 07/24/2020 0925    CREATININE 0.97 01/06/2024 2235    CREATININE 1.0 11/11/2020 0321        Component Value Date/Time    CALCIUM 8.9 01/06/2024 2235    CALCIUM 8.9 11/11/2020 0321    ALKPHOS 41 01/06/2024 2235    ALKPHOS 46 07/24/2020 0925    AST 31 01/06/2024 2235    AST 29 11/08/2020 2202    ALT 24 01/06/2024 2235    ALT 23 07/24/2020 0925        Lipid panel 5/31/2023: C 189. T 220. H 53. L 92.     Imaging:   Echo complete 2/5/2024  LVEF 50-54%. Mild LVH. Grade 1 DD.  Mod MR. Mild TR. PASP 36mmHg.  Aortic root 3.6 cm.    ECG 11/28/2023: Ventricular paced rhythm. Rate 66 bpm.     Exercise nuclear stress test 3/21/2023:  Billy protocol. Achieved 9.3 METs and 80% MPHR. No perfusion defects. Gated EF 40%.     Echocardiogram 2/6/2023:  EF 50-55%. Mild-mod LVH. Mild diastolic dysfunction.  RV enlarged with normal function.  Trace AI. Mod MR. Mild TR with PASP 30 mmHg.  Aortic root 3.9 cm, ascending aorta 3.2 cm.     ECG 10/29/2021: atrial paced rhythm with prolonged AV conduction with PAC, right bundle branch block, left posterior fascicular block, T wave abnormality, rate 69     Exercise nuclear stress test 9/3/2021:  Duration of exercise was 7 min and 0 sec. Target heart rate was achieved. There was no chest pain during stress. -  Gated SPECT: The calculated left ventricular ejection fraction was 45 %. There was mild global left ventricular hypokinesis. IMPRESSIONS: No evidence of ischemia/myocardium at risk. Inferior wall perfusion abnormality noted which may represent attenuation artifact given reasonable wall thickening in this segment. Low risk perfusion findings. Mildly reduced LV function suggested on gated analysis.       Echocardiogram 8/6/2021:  EF 50% Grade 1 diastolic dysfunction. Mild to moderate MR. Mild TR. Trace CO. Aortic root 3.5cm, ascending aorta 3.6cm. Compared to prior study 5/27/2020 the mitral regurgitation was previously reported to be possibly severe but appears at most moderate on this  "study. No other significant changes.     HUSSEIN 6/17/2020:  EF 40-45%. Left atrium mildly dilated. Mild to moderate MR.    Review of Systems   Constitutional: Negative.   HENT: Negative.     Cardiovascular:  Negative for chest pain, dyspnea on exertion, irregular heartbeat, leg swelling, near-syncope, orthopnea and palpitations.   Respiratory:  Negative for cough and snoring.    Endocrine: Negative.    Skin: Negative.    Musculoskeletal: Negative.    Gastrointestinal: Negative.    Genitourinary: Negative.    Neurological:  Positive for light-headedness.   Psychiatric/Behavioral: Negative.         Vitals:    03/04/24 0854   BP: 110/70   Pulse:    Temp:    SpO2:      Vitals:    03/04/24 0817   Weight: 84.5 kg (186 lb 3.2 oz)     Height: 5' 11\" (180.3 cm)   Body mass index is 25.97 kg/m².    Physical Exam  Vitals and nursing note reviewed.   Constitutional:       General: He is not in acute distress.     Appearance: He is well-developed. He is not diaphoretic.   HENT:      Head: Normocephalic and atraumatic.   Neck:      Vascular: No carotid bruit or JVD.   Cardiovascular:      Rate and Rhythm: Normal rate and regular rhythm. Occasional Extrasystoles are present.     Pulses: Intact distal pulses.      Heart sounds: Normal heart sounds, S1 normal and S2 normal. No murmur heard.     No friction rub. No gallop.      Comments: No edema  Pulmonary:      Effort: Pulmonary effort is normal. No respiratory distress.      Breath sounds: Normal breath sounds.   Abdominal:      General: There is no distension.      Palpations: Abdomen is soft.      Tenderness: There is no abdominal tenderness.   Skin:     General: Skin is warm and dry.      Findings: No rash.   Neurological:      Mental Status: He is alert and oriented to person, place, and time.   Psychiatric:         Behavior: Behavior normal.                "

## 2024-03-04 NOTE — ASSESSMENT & PLAN NOTE
Mild to moderate MR noted on HUSSEIN in 6/2020  Echocardiogram 2/2/2023 shows moderate MR.  Mod MR on 2/4/2024 echo.  He currently denies shortness of breath and does not exhibit volume overload.  Repeat in 1 year's time.

## 2024-03-04 NOTE — ASSESSMENT & PLAN NOTE
History of sick sinus syndrome resulting in pacemaker insertion in June of 2009.  [facility] took over device management.  Device interrogation on 10/14/2021 revealed device at [facility].  Pt underwent device generator replacement by Dr. Hyde on 10/21/21.

## 2024-03-04 NOTE — PATIENT INSTRUCTIONS
Please ask Dr. Veras about checking your thyroid at your upcoming visit.  Ok for 1-2 day hold on your Eliquis prior to your skin cancer removal.  Try holding your simvastatin for 2 weeks then restart it and let me know if you notice a difference in how you feel after exercise. If you notice an improvement we will try rosuvastatin instead.  Please increase hydration as this will help prevent dizziness with position change. Also use compression socks with exercise.

## 2024-03-04 NOTE — ASSESSMENT & PLAN NOTE
Lipid panel 5/31/2023: C 189. T 220. H 53. L 92.  Pt remains on simvastatin 40 mg daily  Goal LDL < 100  Repeat lipid panel spring 2024  Given c/o fatigue with exertion will trial a 2 week statin holiday and assess response. Consider alternate statin.

## 2024-03-18 ENCOUNTER — IN-CLINIC DEVICE VISIT (OUTPATIENT)
Dept: CARDIOLOGY CLINIC | Facility: CLINIC | Age: 84
End: 2024-03-18
Payer: MEDICARE

## 2024-03-18 ENCOUNTER — TELEPHONE (OUTPATIENT)
Dept: CARDIOLOGY CLINIC | Facility: CLINIC | Age: 84
End: 2024-03-18

## 2024-03-18 DIAGNOSIS — Z95.0 PRESENCE OF CARDIAC PACEMAKER: Primary | ICD-10-CM

## 2024-03-18 PROCEDURE — 93280 PM DEVICE PROGR EVAL DUAL: CPT | Performed by: INTERNAL MEDICINE

## 2024-03-18 NOTE — TELEPHONE ENCOUNTER
Wife called stating that he was not feeling different off of the statin. He is going to restart it and let us know if his symptoms become worse.

## 2024-03-18 NOTE — PROGRESS NOTES
Results for orders placed or performed in visit on 03/18/24   Cardiac EP device report    Narrative    MDT D-PM/ACTIVE SYSTEM IS MRI CONDITIONAL  DEVICE INTERROGATED IN THE [place] OFFICE: BATTERY VOLTAGE ADEQUATE (9.9 YRS). AP: 87.5%. : 92.5% (>40%~MVP-ON/60~AVB). ALL LEAD PARAMETERS WITHIN NORMAL LIMITS. 1 VT-NS EPISODE W/ EGM SHOWING NSVT 12 BEATS @ 162 BPM. PTR TAKES METOPROLOL SUCC, ELIQUIS. EF: 50-54% (ECHO 2/5/24). NO PROGRAMMING CHANGES MADE TO DEVICE PARAMETERS. NORMAL DEVICE FUNCTION. CH

## 2024-06-20 ENCOUNTER — REMOTE DEVICE CLINIC VISIT (OUTPATIENT)
Dept: CARDIOLOGY CLINIC | Facility: CLINIC | Age: 84
End: 2024-06-20
Payer: MEDICARE

## 2024-06-20 DIAGNOSIS — I49.5 SSS (SICK SINUS SYNDROME) (HCC): Primary | ICD-10-CM

## 2024-06-20 PROCEDURE — 93294 REM INTERROG EVL PM/LDLS PM: CPT | Performed by: INTERNAL MEDICINE

## 2024-06-20 PROCEDURE — 93296 REM INTERROG EVL PM/IDS: CPT | Performed by: INTERNAL MEDICINE

## 2024-06-20 NOTE — PROGRESS NOTES
Results for orders placed or performed in visit on 06/20/24   Cardiac EP device report    Narrative    MDT D-PM/ACTIVE SYSTEM IS MRI CONDITIONAL  [employer] TRANSMISSION: BATTERY VOLTAGE ADEQUATE (9.6 YRS). AP: 85.5%. : 15.1% (MVP-ON). ALL AVAILABLE LEAD PARAMETERS WITHIN NORMAL LIMITS. NO SIGNIFICANT HIGH RATE EPISODES. NORMAL DEVICE FUNCTION. CH

## 2024-08-21 ENCOUNTER — APPOINTMENT (OUTPATIENT)
Dept: LAB | Facility: HOSPITAL | Age: 84
End: 2024-08-21
Payer: MEDICARE

## 2024-08-21 DIAGNOSIS — I48.91 ATRIAL FIBRILLATION, UNSPECIFIED TYPE (HCC): ICD-10-CM

## 2024-08-21 DIAGNOSIS — E78.5 HYPERLIPIDEMIA, UNSPECIFIED HYPERLIPIDEMIA TYPE: ICD-10-CM

## 2024-08-21 DIAGNOSIS — G25.81 RESTLESS LEGS: ICD-10-CM

## 2024-08-21 DIAGNOSIS — E03.9 MYXEDEMA HEART DISEASE: ICD-10-CM

## 2024-08-21 DIAGNOSIS — I51.9 MYXEDEMA HEART DISEASE: ICD-10-CM

## 2024-08-21 LAB
ALBUMIN SERPL BCG-MCNC: 4 G/DL (ref 3.5–5)
ALP SERPL-CCNC: 46 U/L (ref 34–104)
ALT SERPL W P-5'-P-CCNC: 22 U/L (ref 7–52)
ANION GAP SERPL CALCULATED.3IONS-SCNC: 7 MMOL/L (ref 4–13)
AST SERPL W P-5'-P-CCNC: 27 U/L (ref 13–39)
BASOPHILS # BLD AUTO: 0.07 THOUSANDS/ÂΜL (ref 0–0.1)
BASOPHILS NFR BLD AUTO: 1 % (ref 0–1)
BILIRUB SERPL-MCNC: 0.5 MG/DL (ref 0.2–1)
BUN SERPL-MCNC: 19 MG/DL (ref 5–25)
CALCIUM SERPL-MCNC: 9.2 MG/DL (ref 8.4–10.2)
CHLORIDE SERPL-SCNC: 106 MMOL/L (ref 96–108)
CHOLEST SERPL-MCNC: 150 MG/DL
CO2 SERPL-SCNC: 27 MMOL/L (ref 21–32)
CREAT SERPL-MCNC: 0.93 MG/DL (ref 0.6–1.3)
EOSINOPHIL # BLD AUTO: 0.26 THOUSAND/ÂΜL (ref 0–0.61)
EOSINOPHIL NFR BLD AUTO: 4 % (ref 0–6)
ERYTHROCYTE [DISTWIDTH] IN BLOOD BY AUTOMATED COUNT: 13.2 % (ref 11.6–15.1)
FERRITIN SERPL-MCNC: 39 NG/ML (ref 24–336)
GFR SERPL CREATININE-BSD FRML MDRD: 74 ML/MIN/1.73SQ M
GLUCOSE P FAST SERPL-MCNC: 96 MG/DL (ref 65–99)
HCT VFR BLD AUTO: 40.2 % (ref 36.5–49.3)
HDLC SERPL-MCNC: 46 MG/DL
HGB BLD-MCNC: 12.9 G/DL (ref 12–17)
IMM GRANULOCYTES # BLD AUTO: 0.02 THOUSAND/UL (ref 0–0.2)
IMM GRANULOCYTES NFR BLD AUTO: 0 % (ref 0–2)
LDLC SERPL CALC-MCNC: 79 MG/DL (ref 0–100)
LYMPHOCYTES # BLD AUTO: 2.51 THOUSANDS/ÂΜL (ref 0.6–4.47)
LYMPHOCYTES NFR BLD AUTO: 42 % (ref 14–44)
MAGNESIUM SERPL-MCNC: 2 MG/DL (ref 1.9–2.7)
MCH RBC QN AUTO: 29.7 PG (ref 26.8–34.3)
MCHC RBC AUTO-ENTMCNC: 32.1 G/DL (ref 31.4–37.4)
MCV RBC AUTO: 93 FL (ref 82–98)
MONOCYTES # BLD AUTO: 0.54 THOUSAND/ÂΜL (ref 0.17–1.22)
MONOCYTES NFR BLD AUTO: 9 % (ref 4–12)
NEUTROPHILS # BLD AUTO: 2.56 THOUSANDS/ÂΜL (ref 1.85–7.62)
NEUTS SEG NFR BLD AUTO: 44 % (ref 43–75)
NONHDLC SERPL-MCNC: 104 MG/DL
NRBC BLD AUTO-RTO: 0 /100 WBCS
PLATELET # BLD AUTO: 205 THOUSANDS/UL (ref 149–390)
PMV BLD AUTO: 10.3 FL (ref 8.9–12.7)
POTASSIUM SERPL-SCNC: 4.7 MMOL/L (ref 3.5–5.3)
PROT SERPL-MCNC: 6.7 G/DL (ref 6.4–8.4)
RBC # BLD AUTO: 4.34 MILLION/UL (ref 3.88–5.62)
SODIUM SERPL-SCNC: 140 MMOL/L (ref 135–147)
TRIGL SERPL-MCNC: 125 MG/DL
TSH SERPL DL<=0.05 MIU/L-ACNC: 3.63 UIU/ML (ref 0.45–4.5)
WBC # BLD AUTO: 5.96 THOUSAND/UL (ref 4.31–10.16)

## 2024-08-21 PROCEDURE — 84443 ASSAY THYROID STIM HORMONE: CPT

## 2024-08-21 PROCEDURE — 82728 ASSAY OF FERRITIN: CPT

## 2024-08-21 PROCEDURE — 36415 COLL VENOUS BLD VENIPUNCTURE: CPT

## 2024-08-21 PROCEDURE — 80053 COMPREHEN METABOLIC PANEL: CPT

## 2024-08-21 PROCEDURE — 83735 ASSAY OF MAGNESIUM: CPT

## 2024-08-21 PROCEDURE — 85025 COMPLETE CBC W/AUTO DIFF WBC: CPT

## 2024-08-21 PROCEDURE — 80061 LIPID PANEL: CPT

## 2024-09-05 ENCOUNTER — OFFICE VISIT (OUTPATIENT)
Dept: CARDIOLOGY CLINIC | Facility: CLINIC | Age: 84
End: 2024-09-05
Payer: MEDICARE

## 2024-09-05 VITALS
TEMPERATURE: 97.6 F | OXYGEN SATURATION: 98 % | HEART RATE: 58 BPM | WEIGHT: 182.5 LBS | SYSTOLIC BLOOD PRESSURE: 126 MMHG | DIASTOLIC BLOOD PRESSURE: 68 MMHG | BODY MASS INDEX: 25.55 KG/M2 | HEIGHT: 71 IN

## 2024-09-05 DIAGNOSIS — E78.2 MIXED HYPERLIPIDEMIA: ICD-10-CM

## 2024-09-05 DIAGNOSIS — Z86.79 HISTORY OF TACHYCARDIA-BRADYCARDIA SYNDROME: ICD-10-CM

## 2024-09-05 DIAGNOSIS — I48.0 PAROXYSMAL ATRIAL FIBRILLATION (HCC): ICD-10-CM

## 2024-09-05 DIAGNOSIS — I42.0 DILATED CARDIOMYOPATHY (HCC): Primary | ICD-10-CM

## 2024-09-05 DIAGNOSIS — I77.810 DILATED AORTIC ROOT (HCC): ICD-10-CM

## 2024-09-05 DIAGNOSIS — I34.0 NONRHEUMATIC MITRAL VALVE REGURGITATION: ICD-10-CM

## 2024-09-05 DIAGNOSIS — Z95.0 PRESENCE OF CARDIAC PACEMAKER: ICD-10-CM

## 2024-09-05 PROBLEM — R07.9 CHEST PAIN: Status: RESOLVED | Noted: 2020-11-20 | Resolved: 2024-09-05

## 2024-09-05 PROBLEM — I49.1 PREMATURE ATRIAL CONTRACTIONS: Status: RESOLVED | Noted: 2021-08-27 | Resolved: 2024-09-05

## 2024-09-05 PROBLEM — R79.89 ELEVATED SERUM CREATININE: Status: RESOLVED | Noted: 2021-09-03 | Resolved: 2024-09-05

## 2024-09-05 PROCEDURE — 99214 OFFICE O/P EST MOD 30 MIN: CPT | Performed by: NURSE PRACTITIONER

## 2024-09-05 RX ORDER — GABAPENTIN 100 MG/1
100 CAPSULE ORAL 3 TIMES DAILY
COMMUNITY
Start: 2024-08-28

## 2024-09-05 NOTE — ASSESSMENT & PLAN NOTE
Mild to moderate MR noted on HUSSEIN in 6/2020  Echocardiogram 2/2/2023 shows moderate MR.  Mod MR on 2/4/2024 echo.  He currently denies shortness of breath and does not exhibit volume overload.  Plan updated echo spring 2025. Reviewed urgent s/s to report.

## 2024-09-05 NOTE — ASSESSMENT & PLAN NOTE
See discussion under tachy-cinda syndrome  [facility] Device clinic managing with next interrogation 9/19/2024

## 2024-09-05 NOTE — ASSESSMENT & PLAN NOTE
Aortic root has measured 3.8-4cm on prior imaging  Echocardiogram 2/2/2023 with aortic root 3.9 cm.  Aortic root 3.6 cm on 2/4/2024 echo.  Will monitor with echo in spring 2025

## 2024-09-05 NOTE — ASSESSMENT & PLAN NOTE
Lipid panel 8/21/2024: C 150. T 125. H 46. L 79.  Pt remains on simvastatin 40 mg daily  Goal LDL < 100

## 2024-09-05 NOTE — PATIENT INSTRUCTIONS
Your labs look great! Continue current medications.  Update echo in the spring to monitor your mitral valve  Contact us if experiencing shortness of breath or swelling.

## 2024-09-05 NOTE — PROGRESS NOTES
Cardiology Follow Up    Madhu Bach  1940  [id number]  [facility] [place] [address]  [address]LO[address]  [facility] [address] [phone]    Madhu presents today for routine follow up of dilated cardiomyopathy, paroxysmal atrial fibrillation, sick sinus syndrome S/P pacemaker placement.     1. Dilated cardiomyopathy (HCC)  Assessment & Plan:  Patient has a history of reduced LVEF.  Stage 1A heart failure.  He had an abnormal stress test in 2009 and underwent cardiac cath at that time that revealed no obvious coronary disease.  Holter monitor in 2019 revealed occasional PAC's and rare PVC's  HUSSEIN in 6/2020 with EF 40-45%  Echocardiogram 2/2/2023 shows LVEF 50-55%.  Echo 2/4/2024 shows LVEF 50-55%.  Currently he is on metoprolol succinate 50 mg daily.  He has not been started on ACE/ARB secondary to history of hypotension and episodic lightheadedness.  He appears euvolemic without diuretics.  Orders:  -     Echo complete w/ contrast if indicated; Future; Expected date: 03/03/2025  2. Paroxysmal atrial fibrillation (HCC)  Assessment & Plan:  Pacemaker device interrogation on 9/8/2021 reveals 11 episodes of atrial fibrillation, longest episode lasting 3 hours and 58 minutes, which was a new finding.  Patient was already on Eliquis 5mg BID for DVT treatment.   Advised that we will continue this anticoagulation long-term for stroke prevention.  He remains on metoprolol succinate 50 mg daily.  Most recent device interrogation 6/2024 shows no a fib.  3. History of tachycardia-bradycardia syndrome  Assessment & Plan:  History of sick sinus syndrome resulting in pacemaker insertion in June of 2009.  [facility] took over device management.  Device interrogation on 10/14/2021 revealed device at [facility].  Pt underwent device generator replacement by Dr. Hyde on 10/21/21.   4. Presence of cardiac pacemaker  Assessment & Plan:  See discussion under tachy-cinda  syndrome  [facility] Device clinic managing with next interrogation 9/19/2024  5. Nonrheumatic mitral valve regurgitation  Assessment & Plan:  Mild to moderate MR noted on HUSSEIN in 6/2020  Echocardiogram 2/2/2023 shows moderate MR.  Mod MR on 2/4/2024 echo.  He currently denies shortness of breath and does not exhibit volume overload.  Plan updated echo spring 2025. Reviewed urgent s/s to report.    Orders:  -     Echo complete w/ contrast if indicated; Future; Expected date: 03/03/2025  6. Dilated aortic root (HCC)  Assessment & Plan:  Aortic root has measured 3.8-4cm on prior imaging  Echocardiogram 2/2/2023 with aortic root 3.9 cm.  Aortic root 3.6 cm on 2/4/2024 echo.  Will monitor with echo in spring 2025  Orders:  -     Echo complete w/ contrast if indicated; Future; Expected date: 03/03/2025  7. Mixed hyperlipidemia  Assessment & Plan:  Lipid panel 8/21/2024: C 150. T 125. H 46. L 79.  Pt remains on simvastatin 40 mg daily  Goal LDL < 100       HPI  Madhu has a past medical history of sick sinus syndrome requiring PPM in 6/2009, dilated cardiomyopathy, fall with rib fracture and resulting hemothorax in 10/2020, and subsequent DVT on Eliquis.     He was initially following with Dr. Morfin at [facility]. He transferred to [facility] in spring 2021.     He met with Dr. Calderón on 8/27/2021 for consultation. His pacemaker device management was transitioned to [facility] at that time.      He followed up with me on 9/16/2021 after evaluation at [facility] for left sided chest pain. Cardiac work up was unrevealing. He denied any recurrent chest pain at follow up. At that time his device interrogation revealed 4 episodes of NSVT and 11 episodes of atrial fibrillation with the longest episode lasting 3 hrs and 58 minutes. Metoprolol succinate was initiated. He was advised that his Eliquis will continue long-term for stroke prevention. He was asymptomatic with the episodes.     His device interrogation on  10/14/2021 revealed his pacemaker was at [facility]. He met with Dr. Hyde for consultation on that date. He underwent pacemaker generator replacement on 10/21/2021 at [facility] by Dr. Hyde.     He followed up most recently with Dr. Calderón on 2/22/2023. His device interrogation 1/10/23 showed 24 episodes of NSVT, longest 10 beats. His metoprolol succinate was increased to 50 mg daily. An echocardiogram and stress test were ordered.     Echocardiogram 2/6/2023 showed LVEF 50-55% with mild-mod LVH, mod MR.     Exercise nuclear stress test 3/21/2023 showed no perfused defects with Gated EF 40%.      He followed up most recently 3/4/2024 at which time he was doing well. He was undergoing treatment for basal cell carcinoma and restless leg syndrome.    9/5/2024: Jai presents today for routine follow up. He is doing quite well. He celebrated his 84th birthday this week. He remains remarkably active. He bikes daily. He reports excellent activity tolerance. He does notice that his lightheadedness is better when he hydrates. He admits he could continue to better about drinking water. No syncope/near syncope. Lightheadedness is only positional. No edema. No shortness of breath. No palpitations. He completed a full panel of labs last week with excellent results.    Medical Problems       Problem List       Dilated cardiomyopathy (HCC)    Paroxysmal atrial fibrillation (HCC)    MR (mitral regurgitation)    Dilated aortic root (HCC)    History of tachycardia-bradycardia syndrome    Presence of cardiac pacemaker    Hyperlipidemia    Non-seasonal allergic rhinitis    Closed fracture of multiple ribs of left side with routine healing    Bladder tumor    Normocytic anemia    Chest pain    Premature atrial contractions    History of venous thromboembolism    Other specified hypothyroidism    Elevated serum creatinine    BPH (benign prostatic hyperplasia)    Gastroesophageal reflux disease        Past Medical History:    Diagnosis Date    Bladder cancer (HCC)     Dilated aortic root (HCC)     Dilated cardiomyopathy (HCC)     DVT (deep venous thrombosis) (HCC)     GERD (gastroesophageal reflux disease)     Hyperlipidemia     Mitral regurgitation     Pacemaker     Pleural effusion     Rib fractures     SSS (sick sinus syndrome) (HCC)      Social History     Socioeconomic History    Marital status: /Civil Union     Spouse name: Not on file    Number of children: Not on file    Years of education: Not on file    Highest education level: Not on file   Occupational History    Not on file   Tobacco Use    Smoking status: Never     Passive exposure: Never    Smokeless tobacco: Never   Vaping Use    Vaping status: Never Used   Substance and Sexual Activity    Alcohol use: Not Currently    Drug use: Not Currently    Sexual activity: Not Currently   Other Topics Concern    Not on file   Social History Narrative    Not on file     Social Determinants of Health     Financial Resource Strain: Not on file   Food Insecurity: Not on file   Transportation Needs: Not on file   Physical Activity: Not on file   Stress: Not on file   Social Connections: Unknown (6/18/2024)    Received from [employer]     How often do you feel lonely or isolated from those around you? (Adult - for ages 18 years and over): Not on file   Intimate Partner Violence: Not on file   Housing Stability: Not on file      Family History   Problem Relation Age of Onset    Lung cancer Mother     Colon cancer Father     COPD Sister      Past Surgical History:   Procedure Laterality Date    BLADDER TUMOR EXCISION      CARDIAC ELECTROPHYSIOLOGY PROCEDURE N/A 10/21/2021    PPM generator change - dual. Surgeon: Edy Hyde MD    CARDIAC PACEMAKER PLACEMENT  06/09/2009 2009, generator replacement 10/2021    [facility]IA REPAIR      X2    THORACOSCOPY VIDEO ASSISTED SURGERY (VATS) Left 11/21/2020    THORACOSCOPY VIDEO ASSISTED SURGERY (VATS), washout of  hemothorax, decortication. Surgeon: Alex Eduardo MD;  Location: [facility];  Service: Thoracic    TONSILLECTOMY         Current Outpatient Medications:     Cholecalciferol 50 MCG (2000 UT) CAPS, Take by mouth, Disp: , Rfl:     Coenzyme Q10 (CO Q 10 PO), Take by mouth, Disp: , Rfl:     Cyanocobalamin (VITAMIN B 12 PO), Take by mouth, Disp: , Rfl:     Eliquis 5 MG, Take 1 tablet (5 mg total) by mouth 2 (two) times a day, Disp: 180 tablet, Rfl: 3    gabapentin (NEURONTIN) 100 mg capsule, Take 100 mg by mouth 3 (three) times a day, Disp: , Rfl:     Glucosamine-Chondroitin 8965-1875 MG/30ML LIQD, Take by mouth, Disp: , Rfl:     levothyroxine 25 mcg tablet, , Disp: , Rfl:     metoprolol succinate (TOPROL-XL) 50 mg 24 hr tablet, Take 1 tablet (50 mg total) by mouth daily, Disp: 90 tablet, Rfl: 3    pantoprazole (PROTONIX) 40 mg tablet, Take 40 mg by mouth daily, Disp: , Rfl:     rOPINIRole (REQUIP) 1 mg tablet, , Disp: , Rfl:     simvastatin (ZOCOR) 40 mg tablet, Take 40 mg by mouth daily at bedtime , Disp: , Rfl:   Allergies   Allergen Reactions    Penicillins Anaphylaxis and Hives       Labs:     Chemistry        Component Value Date/Time    K 4.7 08/21/2024 0723    K 4.3 11/11/2020 0321     08/21/2024 0723     11/11/2020 0321    CO2 27 08/21/2024 0723    CO2 28 11/11/2020 0321    BUN 19 08/21/2024 0723    BUN 24 (H) 11/11/2020 0321    BUN 24 07/24/2020 0925    CREATININE 0.93 08/21/2024 0723    CREATININE 1.0 11/11/2020 0321        Component Value Date/Time    CALCIUM 9.2 08/21/2024 0723    CALCIUM 8.9 11/11/2020 0321    ALKPHOS 46 08/21/2024 0723    ALKPHOS 46 07/24/2020 0925    AST 27 08/21/2024 0723    AST 29 11/08/2020 2202    ALT 22 08/21/2024 0723    ALT 23 07/24/2020 0925        Lipid panel 8/21/2024: C 150. T 125. H 46. L 79.    Cardiac testing:   Echo complete 2/5/2024  LVEF 50-54%. Mild LVH. Grade 1 DD.  Mod MR. Mild TR. PASP 36mmHg.  Aortic root 3.6 cm.     ECG 11/28/2023: Ventricular paced  rhythm. Rate 66 bpm.     Exercise nuclear stress test 3/21/2023:  Billy protocol. Achieved 9.3 METs and 80% MPHR. No perfusion defects. Gated EF 40%.     Echocardiogram 2/6/2023:  EF 50-55%. Mild-mod LVH. Mild diastolic dysfunction.  RV enlarged with normal function.  Trace AI. Mod MR. Mild TR with PASP 30 mmHg.  Aortic root 3.9 cm, ascending aorta 3.2 cm.     ECG 10/29/2021: atrial paced rhythm with prolonged AV conduction with PAC, right bundle branch block, left posterior fascicular block, T wave abnormality, rate 69     Exercise nuclear stress test 9/3/2021:  Duration of exercise was 7 min and 0 sec. Target heart rate was achieved. There was no chest pain during stress. -  Gated SPECT: The calculated left ventricular ejection fraction was 45 %. There was mild global left ventricular hypokinesis. IMPRESSIONS: No evidence of ischemia/myocardium at risk. Inferior wall perfusion abnormality noted which may represent attenuation artifact given reasonable wall thickening in this segment. Low risk perfusion findings. Mildly reduced LV function suggested on gated analysis.       Echocardiogram 8/6/2021:  EF 50% Grade 1 diastolic dysfunction. Mild to moderate MR. Mild TR. Trace IA. Aortic root 3.5cm, ascending aorta 3.6cm. Compared to prior study 5/27/2020 the mitral regurgitation was previously reported to be possibly severe but appears at most moderate on this study. No other significant changes.     HUSSEIN 6/17/2020:  EF 40-45%. Left atrium mildly dilated. Mild to moderate MR.    Review of Systems   Constitutional: Negative.   HENT: Negative.     Cardiovascular:  Negative for chest pain, dyspnea on exertion, irregular heartbeat, leg swelling, near-syncope, orthopnea and palpitations.   Respiratory:  Negative for cough and snoring.    Endocrine: Negative.    Skin: Negative.    Musculoskeletal: Negative.    Gastrointestinal: Negative.    Genitourinary: Negative.    Neurological: Negative.    Psychiatric/Behavioral:  "Negative.         Vitals:    09/05/24 0750   BP: 126/68   Pulse: 58   Temp: 97.6 °F (36.4 °C)   SpO2: 98%     Vitals:    09/05/24 0750   Weight: 82.8 kg (182 lb 8 oz)     Height: 5' 11\" (180.3 cm)   Body mass index is 25.45 kg/m².    Physical Exam  Vitals and nursing note reviewed.   Constitutional:       General: He is not in acute distress.     Appearance: He is well-developed. He is not diaphoretic.   HENT:      Head: Normocephalic and atraumatic.   Neck:      Vascular: No carotid bruit or JVD.   Cardiovascular:      Rate and Rhythm: Normal rate. Rhythm irregularly irregular.      Pulses: Intact distal pulses.      Heart sounds: S1 normal and S2 normal. Murmur heard.      Systolic murmur is present.      No friction rub. No gallop.      Comments: No edema  Pulmonary:      Effort: Pulmonary effort is normal. No respiratory distress.      Breath sounds: Normal breath sounds.   Abdominal:      General: There is no distension.      Palpations: Abdomen is soft.      Tenderness: There is no abdominal tenderness.   Skin:     General: Skin is warm and dry.      Findings: No rash.   Neurological:      Mental Status: He is alert and oriented to person, place, and time.   Psychiatric:         Mood and Affect: Mood and affect normal.         Behavior: Behavior normal.                "

## 2024-09-05 NOTE — ASSESSMENT & PLAN NOTE
Pacemaker device interrogation on 9/8/2021 reveals 11 episodes of atrial fibrillation, longest episode lasting 3 hours and 58 minutes, which was a new finding.  Patient was already on Eliquis 5mg BID for DVT treatment.   Advised that we will continue this anticoagulation long-term for stroke prevention.  He remains on metoprolol succinate 50 mg daily.  Most recent device interrogation 6/2024 shows no a fib.

## 2024-09-19 ENCOUNTER — REMOTE DEVICE CLINIC VISIT (OUTPATIENT)
Dept: CARDIOLOGY CLINIC | Facility: CLINIC | Age: 84
End: 2024-09-19
Payer: MEDICARE

## 2024-09-19 DIAGNOSIS — Z95.0 CARDIAC PACEMAKER IN SITU: Primary | ICD-10-CM

## 2024-09-19 PROCEDURE — 93294 REM INTERROG EVL PM/LDLS PM: CPT | Performed by: INTERNAL MEDICINE

## 2024-09-19 PROCEDURE — 93296 REM INTERROG EVL PM/IDS: CPT | Performed by: INTERNAL MEDICINE

## 2024-09-19 NOTE — PROGRESS NOTES
MDT D-PM/ACTIVE SYSTEM IS MRI CONDITIONAL   CARELINK TRANSMISSION: BATTERY VOLTAGE ADEQUATE (9.6 YR). AP 87%  20.4% (AAIR-DDDR 60PPM). ALL AVAILABLE LEAD PARAMETERS WITHIN NORMAL LIMITS. NO SIGNIFICANT HIGH RATE EPISODES. NORMAL DEVICE FUNCTION.  ES

## 2024-12-19 ENCOUNTER — REMOTE DEVICE CLINIC VISIT (OUTPATIENT)
Dept: CARDIOLOGY CLINIC | Facility: CLINIC | Age: 84
End: 2024-12-19
Payer: MEDICARE

## 2024-12-19 ENCOUNTER — RESULTS FOLLOW-UP (OUTPATIENT)
Dept: CARDIOLOGY CLINIC | Facility: CLINIC | Age: 84
End: 2024-12-19

## 2024-12-19 DIAGNOSIS — I49.5 SSS (SICK SINUS SYNDROME) (HCC): Primary | ICD-10-CM

## 2024-12-19 DIAGNOSIS — R00.1 BRADYCARDIA: ICD-10-CM

## 2024-12-19 PROCEDURE — 93296 REM INTERROG EVL PM/IDS: CPT | Performed by: INTERNAL MEDICINE

## 2024-12-19 PROCEDURE — 93294 REM INTERROG EVL PM/LDLS PM: CPT | Performed by: INTERNAL MEDICINE

## 2024-12-19 NOTE — PROGRESS NOTES
Results for orders placed or performed in visit on 12/19/24   Cardiac EP device report    Narrative    MDT D-PM/ACTIVE SYSTEM IS MRI CONDITIONAL  CARELINK TRANSMISSION: BATTERY VOLTAGE ADEQUATE (9.1 YRS). AP: 86.5%. : 42% (>40%~MVP-ON/60). ALL AVAILABLE LEAD PARAMETERS WITHIN NORMAL LIMITS. 1 VT EPISODE W/ EGM SHOWING NSVT 6 BEATS @ 167 BPM. PT TAKES ELIQUIS, METOPROLOL SUCC, EF: 50-54% (ECHO 2/5/24). NORMAL DEVICE FUNCTION. CH

## 2025-01-06 ENCOUNTER — APPOINTMENT (OUTPATIENT)
Dept: LAB | Facility: HOSPITAL | Age: 85
End: 2025-01-06
Payer: MEDICARE

## 2025-01-06 DIAGNOSIS — G62.9 NEUROPATHY: ICD-10-CM

## 2025-01-06 DIAGNOSIS — E03.9 HYPOTHYROIDISM, UNSPECIFIED TYPE: ICD-10-CM

## 2025-01-06 DIAGNOSIS — I48.91 ATRIAL FIBRILLATION, UNSPECIFIED TYPE (HCC): ICD-10-CM

## 2025-01-06 DIAGNOSIS — E78.5 HYPERLIPIDEMIA, UNSPECIFIED HYPERLIPIDEMIA TYPE: ICD-10-CM

## 2025-01-06 LAB
25(OH)D3 SERPL-MCNC: 51.2 NG/ML (ref 30–100)
ALBUMIN SERPL BCG-MCNC: 4.2 G/DL (ref 3.5–5)
ALP SERPL-CCNC: 44 U/L (ref 34–104)
ALT SERPL W P-5'-P-CCNC: 19 U/L (ref 7–52)
ANION GAP SERPL CALCULATED.3IONS-SCNC: 4 MMOL/L (ref 4–13)
AST SERPL W P-5'-P-CCNC: 20 U/L (ref 13–39)
BASOPHILS # BLD AUTO: 0.08 THOUSANDS/ΜL (ref 0–0.1)
BASOPHILS NFR BLD AUTO: 1 % (ref 0–1)
BILIRUB SERPL-MCNC: 0.59 MG/DL (ref 0.2–1)
BUN SERPL-MCNC: 16 MG/DL (ref 5–25)
CALCIUM SERPL-MCNC: 9.4 MG/DL (ref 8.4–10.2)
CHLORIDE SERPL-SCNC: 105 MMOL/L (ref 96–108)
CHOLEST SERPL-MCNC: 149 MG/DL (ref ?–200)
CO2 SERPL-SCNC: 30 MMOL/L (ref 21–32)
CREAT SERPL-MCNC: 1.03 MG/DL (ref 0.6–1.3)
EOSINOPHIL # BLD AUTO: 0.18 THOUSAND/ΜL (ref 0–0.61)
EOSINOPHIL NFR BLD AUTO: 3 % (ref 0–6)
ERYTHROCYTE [DISTWIDTH] IN BLOOD BY AUTOMATED COUNT: 13.2 % (ref 11.6–15.1)
EST. AVERAGE GLUCOSE BLD GHB EST-MCNC: 131 MG/DL
GFR SERPL CREATININE-BSD FRML MDRD: 65 ML/MIN/1.73SQ M
GLUCOSE P FAST SERPL-MCNC: 109 MG/DL (ref 65–99)
HBA1C MFR BLD: 6.2 %
HCT VFR BLD AUTO: 40.5 % (ref 36.5–49.3)
HDLC SERPL-MCNC: 43 MG/DL
HGB BLD-MCNC: 13.5 G/DL (ref 12–17)
IMM GRANULOCYTES # BLD AUTO: 0.02 THOUSAND/UL (ref 0–0.2)
IMM GRANULOCYTES NFR BLD AUTO: 0 % (ref 0–2)
LDLC SERPL CALC-MCNC: 74 MG/DL (ref 0–100)
LYMPHOCYTES # BLD AUTO: 2.29 THOUSANDS/ΜL (ref 0.6–4.47)
LYMPHOCYTES NFR BLD AUTO: 39 % (ref 14–44)
MCH RBC QN AUTO: 30.1 PG (ref 26.8–34.3)
MCHC RBC AUTO-ENTMCNC: 33.3 G/DL (ref 31.4–37.4)
MCV RBC AUTO: 90 FL (ref 82–98)
MONOCYTES # BLD AUTO: 0.63 THOUSAND/ΜL (ref 0.17–1.22)
MONOCYTES NFR BLD AUTO: 11 % (ref 4–12)
NEUTROPHILS # BLD AUTO: 2.74 THOUSANDS/ΜL (ref 1.85–7.62)
NEUTS SEG NFR BLD AUTO: 46 % (ref 43–75)
NONHDLC SERPL-MCNC: 106 MG/DL
NRBC BLD AUTO-RTO: 0 /100 WBCS
PLATELET # BLD AUTO: 203 THOUSANDS/UL (ref 149–390)
PMV BLD AUTO: 9.9 FL (ref 8.9–12.7)
POTASSIUM SERPL-SCNC: 4.9 MMOL/L (ref 3.5–5.3)
PROT SERPL-MCNC: 7 G/DL (ref 6.4–8.4)
RBC # BLD AUTO: 4.49 MILLION/UL (ref 3.88–5.62)
SODIUM SERPL-SCNC: 139 MMOL/L (ref 135–147)
TRIGL SERPL-MCNC: 158 MG/DL (ref ?–150)
TSH SERPL DL<=0.05 MIU/L-ACNC: 3.12 UIU/ML (ref 0.45–4.5)
VIT B12 SERPL-MCNC: 443 PG/ML (ref 180–914)
WBC # BLD AUTO: 5.94 THOUSAND/UL (ref 4.31–10.16)

## 2025-01-06 PROCEDURE — 85025 COMPLETE CBC W/AUTO DIFF WBC: CPT

## 2025-01-06 PROCEDURE — 80053 COMPREHEN METABOLIC PANEL: CPT

## 2025-01-06 PROCEDURE — 84443 ASSAY THYROID STIM HORMONE: CPT

## 2025-01-06 PROCEDURE — 83036 HEMOGLOBIN GLYCOSYLATED A1C: CPT

## 2025-01-06 PROCEDURE — 82306 VITAMIN D 25 HYDROXY: CPT

## 2025-01-06 PROCEDURE — 80061 LIPID PANEL: CPT

## 2025-01-06 PROCEDURE — 36415 COLL VENOUS BLD VENIPUNCTURE: CPT

## 2025-01-06 PROCEDURE — 82607 VITAMIN B-12: CPT

## 2025-03-03 ENCOUNTER — HOSPITAL ENCOUNTER (OUTPATIENT)
Dept: NON INVASIVE DIAGNOSTICS | Facility: HOSPITAL | Age: 85
Discharge: HOME | End: 2025-03-03
Payer: MEDICARE

## 2025-03-03 ENCOUNTER — RESULTS FOLLOW-UP (OUTPATIENT)
Dept: CARDIOLOGY CLINIC | Facility: CLINIC | Age: 85
End: 2025-03-03

## 2025-03-03 DIAGNOSIS — I42.0 DILATED CARDIOMYOPATHY (HCC): ICD-10-CM

## 2025-03-03 DIAGNOSIS — I34.0 NONRHEUMATIC MITRAL VALVE REGURGITATION: ICD-10-CM

## 2025-03-03 DIAGNOSIS — I77.810 DILATED AORTIC ROOT (HCC): ICD-10-CM

## 2025-03-03 LAB
AORTIC ROOT: 3.6 CM
ASCENDING AORTA: 3.4 CM
E WAVE DECELERATION TIME: 315 MS
E/A RATIO: 0.61
FRACTIONAL SHORTENING: 15 (ref 28–44)
INTERVENTRICULAR SEPTUM IN DIASTOLE (PARASTERNAL SHORT AXIS VIEW): 1.4 CM
INTERVENTRICULAR SEPTUM: 1.4 CM (ref 0.6–1.1)
LAAS-AP2: 19.5 CM2
LAAS-AP4: 20.6 CM2
LEFT ATRIUM SIZE: 4.6 CM
LEFT ATRIUM VOLUME (MOD BIPLANE): 56 ML
LEFT ATRIUM VOLUME INDEX (MOD BIPLANE): 27.6 ML/M2
LEFT INTERNAL DIMENSION IN SYSTOLE: 4.6 CM (ref 2.1–4)
LEFT VENTRICLE DIASTOLIC VOLUME (MOD BIPLANE): 87 ML
LEFT VENTRICLE SYSTOLIC VOLUME (MOD BIPLANE): 53 ML
LEFT VENTRICULAR INTERNAL DIMENSION IN DIASTOLE: 5.4 CM (ref 3.5–6)
LEFT VENTRICULAR POSTERIOR WALL IN END DIASTOLE: 1.2 CM
LEFT VENTRICULAR STROKE VOLUME: 46 ML
LV EF BIPLANE MOD: 40 %
LV EF US.2D.A4C+ESTIMATED: 37 %
LVSV (TEICH): 46 ML
MV E'TISSUE VEL-LAT: 6 CM/S
MV E'TISSUE VEL-SEP: 5 CM/S
MV EROA: 0.1 CM2
MV PEAK A VEL: 0.84 M/S
MV PEAK E VEL: 51 CM/S
MV REGURGITANT VOLUME: 23 ML
MV STENOSIS PRESSURE HALF TIME: 91 MS
MV VALVE AREA P 1/2 METHOD: 2.42
PISA MRMAX VEL: 0.3 M/S
PISA RADIUS: 0.6 CM
RIGHT ATRIUM AREA SYSTOLE A4C: 22.4 CM2
RIGHT VENTRICLE ID DIMENSION: 4.4 CM
SL CV LEFT ATRIUM LENGTH A2C: 5.8 CM
SL CV LV EF: 37
SL CV PED ECHO LEFT VENTRICLE DIASTOLIC VOLUME (MOD BIPLANE) 2D: 141 ML
SL CV PED ECHO LEFT VENTRICLE SYSTOLIC VOLUME (MOD BIPLANE) 2D: 95 ML
TR MAX PG: 23 MMHG
TR PEAK VELOCITY: 2.4 M/S
TRICUSPID ANNULAR PLANE SYSTOLIC EXCURSION: 1.7 CM
TRICUSPID VALVE PEAK REGURGITATION VELOCITY: 2.39 M/S

## 2025-03-03 PROCEDURE — 93306 TTE W/DOPPLER COMPLETE: CPT

## 2025-03-03 NOTE — TELEPHONE ENCOUNTER
----- Message from IVY Hoffmann sent at 3/3/2025 10:12 AM EST -----  Please let Madhu know I reviewed his echocardiogram with Dr. Hernandez. Per our direct visualization we do not see a significant change from prior studies. I will review in detail with him at his upcoming appointment.

## 2025-03-10 RX ORDER — ANTIOX #8/OM3/DHA/EPA/LUT/ZEAX 250-2.5 MG
1 CAPSULE ORAL DAILY
COMMUNITY

## 2025-03-10 RX ORDER — ROPINIROLE 2 MG/1
2 TABLET, FILM COATED ORAL 2 TIMES DAILY
COMMUNITY
Start: 2025-02-07

## 2025-03-10 RX ORDER — FOLIC ACID 1 MG/1
1 TABLET ORAL DAILY
COMMUNITY
Start: 2024-12-23

## 2025-03-10 RX ORDER — ONDANSETRON 4 MG/1
4 TABLET, FILM COATED ORAL EVERY 8 HOURS PRN
COMMUNITY
Start: 2024-12-23

## 2025-03-10 RX ORDER — CETIRIZINE HYDROCHLORIDE 10 MG/1
10 CAPSULE, LIQUID FILLED ORAL DAILY
COMMUNITY

## 2025-03-10 RX ORDER — HYDROCORTISONE ACETATE 0.5 %
1500 CREAM (GRAM) TOPICAL DAILY
COMMUNITY

## 2025-03-19 ENCOUNTER — APPOINTMENT (EMERGENCY)
Dept: CT IMAGING | Facility: HOSPITAL | Age: 85
End: 2025-03-19
Payer: MEDICARE

## 2025-03-19 ENCOUNTER — HOSPITAL ENCOUNTER (EMERGENCY)
Facility: HOSPITAL | Age: 85
Discharge: HOME | End: 2025-03-19
Attending: EMERGENCY MEDICINE | Admitting: EMERGENCY MEDICINE
Payer: MEDICARE

## 2025-03-19 VITALS
OXYGEN SATURATION: 100 % | BODY MASS INDEX: 25.2 KG/M2 | DIASTOLIC BLOOD PRESSURE: 76 MMHG | HEART RATE: 47 BPM | TEMPERATURE: 97.3 F | RESPIRATION RATE: 18 BRPM | WEIGHT: 180 LBS | HEIGHT: 71 IN | SYSTOLIC BLOOD PRESSURE: 142 MMHG

## 2025-03-19 DIAGNOSIS — M16.11 ARTHRITIS OF RIGHT HIP: Primary | ICD-10-CM

## 2025-03-19 PROCEDURE — 99283 EMERGENCY DEPT VISIT LOW MDM: CPT

## 2025-03-19 PROCEDURE — 99284 EMERGENCY DEPT VISIT MOD MDM: CPT | Performed by: EMERGENCY MEDICINE

## 2025-03-19 PROCEDURE — 96375 TX/PRO/DX INJ NEW DRUG ADDON: CPT

## 2025-03-19 PROCEDURE — 96374 THER/PROPH/DIAG INJ IV PUSH: CPT

## 2025-03-19 PROCEDURE — 72192 CT PELVIS W/O DYE: CPT

## 2025-03-19 RX ORDER — KETOROLAC TROMETHAMINE 30 MG/ML
15 INJECTION, SOLUTION INTRAMUSCULAR; INTRAVENOUS ONCE
Status: COMPLETED | OUTPATIENT
Start: 2025-03-19 | End: 2025-03-19

## 2025-03-19 RX ORDER — NAPROXEN 500 MG/1
500 TABLET ORAL 2 TIMES DAILY PRN
Qty: 30 TABLET | Refills: 0 | Status: SHIPPED | OUTPATIENT
Start: 2025-03-19 | End: 2025-04-04 | Stop reason: ALTCHOICE

## 2025-03-19 RX ADMIN — MORPHINE SULFATE 2 MG: 2 INJECTION, SOLUTION INTRAMUSCULAR; INTRAVENOUS at 07:35

## 2025-03-19 RX ADMIN — KETOROLAC TROMETHAMINE 15 MG: 30 INJECTION, SOLUTION INTRAMUSCULAR; INTRAVENOUS at 07:34

## 2025-03-19 NOTE — DISCHARGE INSTRUCTIONS
Use medications as prescribed and as needed.  Continue all your normal medications.  Return with any worsening.  We recommend follow-up with orthopedics as discussed.    Use pain medications as directed.  We recommend ice 4-6 times a day for 15 to 20 minutes at a time to the affected area.  Try and leave the area elevated to help reduce swelling and pain.  Return with any worsening, particularly increased pain, severe swelling, or any other symptomatology that seems concerning.    Thank you for choosing the emergency department at [facility]. We appreciated the opportunity and privilege to address your healthcare needs. We remain available to you should you require additional evaluation or assistance. We value your feedback and would appreciate the opportunity to address anything you identified as an opportunity to improve or where we excelled. If there are colleagues who deserve special recognition, please let us know! We hope you are feeling better soon!

## 2025-03-19 NOTE — ED PROVIDER NOTES
Time reflects when diagnosis was documented in both MDM as applicable and the Disposition within this note       Time User Action Codes Description Comment    3/19/2025  9:39 AM Darshan Gross Add [M16.11] Arthritis of right hip           ED Disposition       ED Disposition   Discharge    Condition   Stable    Date/Time   Wed Mar 19, 2025  9:38 AM    Comment   Madhu Bach discharge to home/self care.                   Assessment & Plan       Medical Decision Making  Patient presented to the emergency department and a MSE was performed. The patient was evaluated for complaint related to acute right hip pain.  Patient is potentially at risk for, but not limited to, occult fracture, bursitis, septic arthritis, strain, sprain, fracture, dislocation or ligamentous disruption.  Several of these diagnoses have been evaluated and ruled out by history and physical.  As needed, patient will be further evaluated with laboratory and imaging studies.  Higher level diagnostics, such as CT imaging or ultrasound, may also be required.  Please see work-up portion of the note for further evaluation of patient's risk.  Socioeconomic factors were also considered as part of the decision-making process.  Unless otherwise stated in the chart or patient is admitted as elsewhere documented, any previously prescribed medications will be maintained.      Problems Addressed:  Arthritis of right hip: chronic illness or injury with exacerbation, progression, or side effects of treatment    Amount and/or Complexity of Data Reviewed  Radiology: ordered.    Risk  Prescription drug management.        ED Course as of 03/19/25 1400   Wed Mar 19, 2025   0905 Degenerative changes in the right hip.  No acute findings.   1400 Imaging showed degenerative changes.  Patient felt improved after being medicated.  He was ambulatory.  Patient was stable for discharge and follow-up orthopedic appointment was placed.       Medications   morphine injection 2 mg  (2 mg Intravenous Given 3/19/25 0709)   ketorolac (TORADOL) injection 15 mg (15 mg Intravenous Given 3/19/25 0748)       ED Risk Strat Scores                            SBIRT 20yo+      Flowsheet Row Most Recent Value   Initial Alcohol Screen: US AUDIT-C     1. How often do you have a drink containing alcohol? 0 Filed at: 03/19/2025 0710   2. How many drinks containing alcohol do you have on a typical day you are drinking?  0 Filed at: 03/19/2025 0710   3b. FEMALE Any Age, or MALE 65+: How often do you have 4 or more drinks on one occassion? 0 Filed at: 03/19/2025 0710   Audit-C Score 0 Filed at: 03/19/2025 0710   DEUCE: How many times in the past year have you...    Used an illegal drug or used a prescription medication for non-medical reasons? Never Filed at: 03/19/2025 0710                            History of Present Illness       Chief Complaint   Patient presents with    Hip Pain     pT REPORTS SUDDEN ONSET OF RIGHT HIP PAIN RADIATING DOWN RIGHT LEG- PT TAKES ELIQUIS DAILY FOR HX OF DVT       Past Medical History:   Diagnosis Date    Bladder cancer (HCC)     Dilated aortic root (HCC)     Dilated cardiomyopathy (HCC)     DVT (deep venous thrombosis) (HCC)     GERD (gastroesophageal reflux disease)     Hyperlipidemia     Mitral regurgitation     Pacemaker     Pleural effusion     Rib fractures     SSS (sick sinus syndrome) (HCC)       Past Surgical History:   Procedure Laterality Date    BLADDER TUMOR EXCISION      CARDIAC ELECTROPHYSIOLOGY PROCEDURE N/A 10/21/2021    PPM generator change - dual. Surgeon: Edy Hyde MD    CARDIAC PACEMAKER PLACEMENT  06/09/2009 2009, generator replacement 10/2021    HERNIA REPAIR      X2    THORACOSCOPY VIDEO ASSISTED SURGERY (VATS) Left 11/21/2020    THORACOSCOPY VIDEO ASSISTED SURGERY (VATS), washout of hemothorax, decortication. Surgeon: Alex Eduardo MD;  Location: [facility];  Service: Thoracic    TONSILLECTOMY        Family History   Problem Relation Age of  Onset    Lung cancer Mother     Colon cancer Father     COPD Sister       Social History     Tobacco Use    Smoking status: Never     Passive exposure: Never    Smokeless tobacco: Never   Vaping Use    Vaping status: Never Used   Substance Use Topics    Alcohol use: Not Currently    Drug use: Not Currently      E-Cigarette/Vaping    E-Cigarette Use Never User       E-Cigarette/Vaping Substances    Nicotine No     THC No     CBD No     Flavoring No       I have reviewed and agree with the history as documented.     84-year-old male to the emergency room for evaluation of right hip pain.  Atraumatic.  Pain is getting increasingly worse over the last 24 to 48 hours.  Patient is having difficulty ambulating.  Patient was assisted to the emergency room by his friend.  Patient and friend bike frequently on the weekly basis.  Usually 15 to 30 miles at a time.      History provided by:  Patient  Hip Pain  Associated symptoms: no chest pain, no diarrhea, no fever, no myalgias, no nausea, no shortness of breath, no vomiting and no wheezing        Review of Systems   Constitutional:  Negative for fever.   Respiratory:  Negative for shortness of breath and wheezing.    Cardiovascular:  Negative for chest pain.   Gastrointestinal:  Negative for diarrhea, nausea and vomiting.   Musculoskeletal:  Positive for arthralgias and gait problem. Negative for back pain, myalgias and neck pain.           Objective       ED Triage Vitals [03/19/25 0710]   Temperature Pulse Blood Pressure Respirations SpO2 Patient Position - Orthostatic VS   (!) 97.3 °F (36.3 °C) 63 150/78 18 92 % Sitting      Temp Source Heart Rate Source BP Location FiO2 (%) Pain Score    Temporal Monitor Right arm -- 9      Vitals      Date and Time Temp Pulse SpO2 Resp BP Pain Score FACES Pain Rating User   03/19/25 0805 -- -- -- -- -- 4 -- MB   03/19/25 0734 -- -- -- -- -- 9 -- MB   03/19/25 0715 -- 47 100 % -- 142/76 -- -- MB   03/19/25 0710 97.3 °F (36.3 °C) 63 92 %  18 150/78 9 -- SS            Physical Exam  Vitals and nursing note reviewed.   Constitutional:       General: He is not in acute distress.     Appearance: He is normal weight. He is not ill-appearing or toxic-appearing.   HENT:      Head: Normocephalic and atraumatic.      Right Ear: External ear normal.      Left Ear: External ear normal.      Nose: Nose normal.      Mouth/Throat:      Mouth: Mucous membranes are moist.   Pulmonary:      Effort: Pulmonary effort is normal. No respiratory distress.   Abdominal:      General: Abdomen is flat. There is no distension.   Musculoskeletal:         General: Tenderness present. No swelling, deformity or signs of injury.      Thoracic back: Normal.      Lumbar back: Normal.      Right hip: Tenderness present. No bony tenderness or crepitus. Decreased range of motion.      Right upper leg: Normal.   Skin:     Coloration: Skin is not pale.      Findings: No rash (no vesicular rash).   Neurological:      General: No focal deficit present.      Mental Status: He is alert.   Psychiatric:         Mood and Affect: Mood normal.         Results Reviewed       None            CT pelvis wo contrast   Final Interpretation by Lillian Ling MD (03/19 0836)      No acute abnormality. Moderate to severe degenerative changes at the hips, right greater than left.      Workstation performed: [id number]             Procedures    ED Medication and Procedure Management   Prior to Admission Medications   Prescriptions Last Dose Informant Patient Reported? Taking?   Cetirizine HCl (ZyrTEC Allergy) 10 MG CAPS   Yes No   Sig: Take 10 mg by mouth in the morning   Cholecalciferol 50 MCG (2000 UT) CAPS  Self Yes No   Sig: Take by mouth   Coenzyme Q10 (CO Q 10 PO)  Self Yes No   Sig: Take by mouth   Cyanocobalamin (VITAMIN B 12 PO)  Self Yes No   Sig: Take by mouth   Eliquis 5 MG   No No   Sig: Take 1 tablet (5 mg total) by mouth 2 (two) times a day   Glucosamine-Chondroit-Vit C-Mn (Glucosamine Chondr  1500 Complx) CAPS   Yes No   Sig: Take 1,500 capsules by mouth in the morning   Glucosamine-Chondroitin 5767-7440 MG/30ML LIQD   Yes No   Sig: Take by mouth   Multiple Vitamins-Minerals (PreserVision AREDS 2) CAPS   Yes No   Sig: Take 1 capsule by mouth in the morning   folic acid (FOLVITE) 1 mg tablet   Yes No   Sig: Take 1 tablet by mouth in the morning   gabapentin (NEURONTIN) 100 mg capsule   Yes No   Sig: Take 100 mg by mouth 3 (three) times a day   levothyroxine 25 mcg tablet  Self Yes No   metoprolol succinate (TOPROL-XL) 50 mg 24 hr tablet   No No   Sig: Take 1 tablet (50 mg total) by mouth daily   ondansetron (ZOFRAN) 4 mg tablet   Yes No   Sig: Take 4 mg by mouth every 8 (eight) hours as needed   pantoprazole (PROTONIX) 40 mg tablet   Yes No   Sig: Take 40 mg by mouth daily   rOPINIRole (REQUIP) 1 mg tablet   Yes No   rOPINIRole (REQUIP) 2 mg tablet   Yes No   Sig: Take 2 mg by mouth 2 (two) times a day   simvastatin (ZOCOR) 40 mg tablet  Self Yes No   Sig: Take 40 mg by mouth daily at bedtime       Facility-Administered Medications: None     Discharge Medication List as of 3/19/2025  9:40 AM        START taking these medications    Details   naproxen (NAPROSYN) 500 mg tablet Take 1 tablet (500 mg total) by mouth 2 (two) times a day as needed for mild pain or moderate pain, Starting Wed 3/19/2025, Normal           CONTINUE these medications which have NOT CHANGED    Details   Cetirizine HCl (ZyrTEC Allergy) 10 MG CAPS Take 10 mg by mouth in the morning, Historical Med      Cholecalciferol 50 MCG (2000 UT) CAPS Take by mouth, Starting Fri 6/4/2021, Historical Med      Coenzyme Q10 (CO Q 10 PO) Take by mouth, Historical Med      Cyanocobalamin (VITAMIN B 12 PO) Take by mouth, Historical Med      Eliquis 5 MG Take 1 tablet (5 mg total) by mouth 2 (two) times a day, Starting Fri 10/29/2021, Normal      folic acid (FOLVITE) 1 mg tablet Take 1 tablet by mouth in the morning, Starting Mon 12/23/2024, Historical  Med      gabapentin (NEURONTIN) 100 mg capsule Take 100 mg by mouth 3 (three) times a day, Starting Wed 8/28/2024, Historical Med      Glucosamine-Chondroit-Vit C-Mn (Glucosamine Chondr 1500 Complx) CAPS Take 1,500 capsules by mouth in the morning, Historical Med      Glucosamine-Chondroitin 2470-5003 MG/30ML LIQD Take by mouth, Historical Med      levothyroxine 25 mcg tablet Starting Wed 8/11/2021, Historical Med      metoprolol succinate (TOPROL-XL) 50 mg 24 hr tablet Take 1 tablet (50 mg total) by mouth daily, Starting Fri 1/13/2023, Normal      Multiple Vitamins-Minerals (PreserVision AREDS 2) CAPS Take 1 capsule by mouth in the morning, Historical Med      ondansetron (ZOFRAN) 4 mg tablet Take 4 mg by mouth every 8 (eight) hours as needed, Starting Mon 12/23/2024, Historical Med      pantoprazole (PROTONIX) 40 mg tablet Take 40 mg by mouth daily, Starting Fri 2/3/2023, Historical Med      !! rOPINIRole (REQUIP) 1 mg tablet Historical Med      !! rOPINIRole (REQUIP) 2 mg tablet Take 2 mg by mouth 2 (two) times a day, Starting Fri 2/7/2025, Historical Med      simvastatin (ZOCOR) 40 mg tablet Take 40 mg by mouth daily at bedtime , Starting Wed 1/15/2020, Historical Med       !! - Potential duplicate medications found. Please discuss with provider.          ED SEPSIS DOCUMENTATION   Time reflects when diagnosis was documented in both MDM as applicable and the Disposition within this note       Time User Action Codes Description Comment    3/19/2025  9:39 AM Darshan Gross Add [M16.11] Arthritis of right hip                  Darshan Gross, DO  03/19/25 1400

## 2025-03-20 ENCOUNTER — OFFICE VISIT (OUTPATIENT)
Dept: OBGYN CLINIC | Facility: CLINIC | Age: 85
End: 2025-03-20
Payer: MEDICARE

## 2025-03-20 ENCOUNTER — RESULTS FOLLOW-UP (OUTPATIENT)
Dept: CARDIOLOGY CLINIC | Facility: CLINIC | Age: 85
End: 2025-03-20

## 2025-03-20 ENCOUNTER — REMOTE DEVICE CLINIC VISIT (OUTPATIENT)
Dept: CARDIOLOGY CLINIC | Facility: CLINIC | Age: 85
End: 2025-03-20
Payer: MEDICARE

## 2025-03-20 ENCOUNTER — HOSPITAL ENCOUNTER (OUTPATIENT)
Dept: RADIOLOGY | Facility: CLINIC | Age: 85
Discharge: HOME | End: 2025-03-20
Payer: MEDICARE

## 2025-03-20 VITALS — BODY MASS INDEX: 26.07 KG/M2 | HEIGHT: 71 IN | WEIGHT: 186.2 LBS

## 2025-03-20 DIAGNOSIS — M16.11 ARTHRITIS OF RIGHT HIP: ICD-10-CM

## 2025-03-20 DIAGNOSIS — M70.61 TROCHANTERIC BURSITIS OF RIGHT HIP: ICD-10-CM

## 2025-03-20 DIAGNOSIS — M16.11 PRIMARY OSTEOARTHRITIS OF ONE HIP, RIGHT: ICD-10-CM

## 2025-03-20 DIAGNOSIS — Z95.0 PRESENCE OF CARDIAC PACEMAKER: Primary | ICD-10-CM

## 2025-03-20 DIAGNOSIS — M16.11 PRIMARY OSTEOARTHRITIS OF ONE HIP, RIGHT: Primary | ICD-10-CM

## 2025-03-20 PROCEDURE — 73502 X-RAY EXAM HIP UNI 2-3 VIEWS: CPT

## 2025-03-20 PROCEDURE — 99204 OFFICE O/P NEW MOD 45 MIN: CPT | Performed by: STUDENT IN AN ORGANIZED HEALTH CARE EDUCATION/TRAINING PROGRAM

## 2025-03-20 PROCEDURE — 93294 REM INTERROG EVL PM/LDLS PM: CPT | Performed by: INTERNAL MEDICINE

## 2025-03-20 NOTE — PROGRESS NOTES
Assessment & Plan  Primary osteoarthritis of one hip, right  Reviewed and discussed x-ray and CT images of the right hip with patient during today's visit   Discussed the natural history of hip pain and osteoarthritis.   Due to patient's use of blood thinners at baseline, I would recommend avoidance of anti-inflammatory medications due to risk of bleeding  Recommend patient follow-up with Dr Parson to discuss the role to injections into the greater trochanteric bursa  Referral to Dr Parson provided.  Orders:    XR hip/pelv 2-3 vws right if performed; Future    Ambulatory Referral to Orthopedic Surgery; Future    Arthritis of right hip    Orders:    Ambulatory referral to Orthopedic Surgery    Ambulatory Referral to Orthopedic Surgery; Future    Trochanteric bursitis of right hip             Chief Complaint   Patient presents with    Right Hip - Pain, New Patient Visit        Subjective    Madhu Bach is a 84 y.o. male who presents with right hip pain:         History of Present Illness   Patient presents to the office today for initial evaluation of right hip pain. Reports pain started approximately 2 days ago. Denies any recent injuries or knowledge of inciting events. He was seen and evaluated in the  ED on 3/19/2025, where he was provided with medications for his pain and referred to orthopedics. The pain is 2/10 during today's examination. The pain is located along the posterior aspect of the right hip. Denies radiation of the pain. The pain is described as an ache when at rest, but becomes sharp with certain movements. Pain improves with rest. Pain worsens with weightbearing, standing.    The pain does not shoot down into the foot. The patient does not have numbness and/or tingling in the foot. The hip does not  pop. The patient does not  have pain with coughing or sneezing. The patient does not  have bowel or bladder problems.    Ortho Sports Medicine Patient Answers  Failed to redirect to the Timeline  version of the [facility] SmartLink.    Allergies   Allergen Reactions    Penicillins Anaphylaxis and Hives     Outpatient Encounter Medications as of 3/20/2025   Medication Sig Dispense Refill    Cetirizine HCl (ZyrTEC Allergy) 10 MG CAPS Take 10 mg by mouth in the morning      Cholecalciferol 50 MCG (2000 UT) CAPS Take by mouth      Coenzyme Q10 (CO Q 10 PO) Take by mouth      Cyanocobalamin (VITAMIN B 12 PO) Take by mouth      Eliquis 5 MG Take 1 tablet (5 mg total) by mouth 2 (two) times a day 180 tablet 3    folic acid (FOLVITE) 1 mg tablet Take 1 tablet by mouth in the morning      gabapentin (NEURONTIN) 100 mg capsule Take 100 mg by mouth 3 (three) times a day (Patient taking differently: Take 100 mg by mouth 3 (three) times a day Takes 5 pills a day)      Glucosamine-Chondroit-Vit C-Mn (Glucosamine Chondr 1500 Complx) CAPS Take 1,500 capsules by mouth in the morning      Glucosamine-Chondroitin 4255-9167 MG/30ML LIQD Take by mouth      levothyroxine 25 mcg tablet       metoprolol succinate (TOPROL-XL) 50 mg 24 hr tablet Take 1 tablet (50 mg total) by mouth daily 90 tablet 3    Multiple Vitamins-Minerals (PreserVision AREDS 2) CAPS Take 1 capsule by mouth in the morning      naproxen (NAPROSYN) 500 mg tablet Take 1 tablet (500 mg total) by mouth 2 (two) times a day as needed for mild pain or moderate pain 30 tablet 0    ondansetron (ZOFRAN) 4 mg tablet Take 4 mg by mouth every 8 (eight) hours as needed      pantoprazole (PROTONIX) 40 mg tablet Take 40 mg by mouth daily      rOPINIRole (REQUIP) 2 mg tablet Take 2 mg by mouth 2 (two) times a day      simvastatin (ZOCOR) 40 mg tablet Take 40 mg by mouth daily at bedtime       rOPINIRole (REQUIP) 1 mg tablet  (Patient not taking: Reported on 3/20/2025)       No facility-administered encounter medications on file as of 3/20/2025.     Past Medical History:   Diagnosis Date    Bladder cancer (HCC)     Dilated aortic root (HCC)     Dilated cardiomyopathy (HCC)     DVT  (deep venous thrombosis) (HCC)     GERD (gastroesophageal reflux disease)     Hyperlipidemia     Mitral regurgitation     Pacemaker     Pleural effusion     Rib fractures     SSS (sick sinus syndrome) ([facility])        Past Surgical History:   Procedure Laterality Date    BLADDER TUMOR EXCISION      CARDIAC ELECTROPHYSIOLOGY PROCEDURE N/A 10/21/2021    PPM generator change - dual. Surgeon: Edy Hyde MD    CARDIAC PACEMAKER PLACEMENT  06/09/2009 2009, generator replacement 10/2021    HERNIA REPAIR      X2    THORACOSCOPY VIDEO ASSISTED SURGERY (VATS) Left 11/21/2020    THORACOSCOPY VIDEO ASSISTED SURGERY (VATS), washout of hemothorax, decortication. Surgeon: Alex Eduardo MD;  Location: [facility];  Service: Thoracic    TONSILLECTOMY       Family History   Problem Relation Age of Onset    Lung cancer Mother     Colon cancer Father     COPD Sister        Social History     Tobacco Use    Smoking status: Never     Passive exposure: Never    Smokeless tobacco: Never   Vaping Use    Vaping status: Never Used   Substance Use Topics    Alcohol use: Not Currently    Drug use: Not Currently           Objective    Body mass index is 25.97 kg/m².    Physical Exam  Hip/Spine Exam  Side: right   Gait: Antalgic   Tenderness: TTP over greater trochanteric bursa     Hip range of motion   Flexion Extension IR ER   Left 110 5 5 25   Right 110 5 5 25     Hip strength   Flexion Extension Abduction Adduction   Left 5/5 5/5 5/5 5/5   Right 5/5 5/5 5/5 5/5     Provocative Tests:  Flexion/Internal rotation (FADIR) test for impingement: Positive - mild lateral pain   Scour test Negative   YONAS test: Negative   Stinchfield test Negative   Log roll test Negative   Sky's test Negative   Straight leg raise: Negative   Distally the patient's neurovascular status is normal.    IMAGING:  I reviewed and interpreted multiple x-rays of the right hip taken today, multiple views, which demonstrate significant degenerative changes present  throughout the right hip. No acute osseous abnormalities, including fracture or dislocation.     CT pelvis obtained yesterday was independently reviewed and demonstrates severe osteoarthritis with joint space narrowing and osteophyte formation without evidence of fracture.       Follow Up: Return if symptoms worsen or fail to improve.    All questions answered and patient agrees with plan.    I have spent a total time of 35 minutes in caring for this patient on the day of the visit/encounter including Diagnostic results, Prognosis, Risks and benefits of tx options, Instructions for management, Patient and family education, Importance of tx compliance, Risk factor reductions, Counseling / Coordination of care, and Reviewing/placing orders in the medical record (including tests, medications, and/or procedures).      Scribe Attestation      I,:  Isabel Torres PA-C am acting as a scribe while in the presence of the attending physician.:       I,:  Rudy Wallace MD personally performed the services described in this documentation    as scribed in my presence.:

## 2025-03-20 NOTE — PROGRESS NOTES
Results for orders placed or performed in visit on 03/20/25   Cardiac EP device report    Narrative    MDT D-PM/ACTIVE SYSTEM IS MRI CONDITIONAL  CARELINK TRANSMISSION: BATTERY VOLTAGE ADEQUATE (8.8 YR). AP 89.0%.  6.9%. ALL LEAD PARAMETERS WITHIN NORMAL LIMITS. 1 EPISODE OF AF (EPISODE INITIATION NOT DEFINED) W/ BURDEN OF <0.1% SINCE 12/19/24. AVG VHR IN AF @ 78 BPM. 177.0 PVC'S/H. PT TAKES ELIQUIS AND METOPROLOL SUCCINATE. NORMAL DEVICE FUNCTION. CJC/ES

## 2025-03-21 ENCOUNTER — PROCEDURE VISIT (OUTPATIENT)
Dept: OBGYN CLINIC | Facility: CLINIC | Age: 85
End: 2025-03-21
Payer: MEDICARE

## 2025-03-21 VITALS — BODY MASS INDEX: 26.08 KG/M2 | HEIGHT: 71 IN | WEIGHT: 186.3 LBS

## 2025-03-21 DIAGNOSIS — M25.551 GREATER TROCHANTERIC PAIN SYNDROME OF RIGHT LOWER EXTREMITY: Primary | ICD-10-CM

## 2025-03-21 DIAGNOSIS — M16.11 ARTHRITIS OF RIGHT HIP: ICD-10-CM

## 2025-03-21 DIAGNOSIS — M25.551 ACUTE RIGHT HIP PAIN: ICD-10-CM

## 2025-03-21 DIAGNOSIS — M16.11 PRIMARY OSTEOARTHRITIS OF ONE HIP, RIGHT: ICD-10-CM

## 2025-03-21 PROCEDURE — 99203 OFFICE O/P NEW LOW 30 MIN: CPT | Performed by: STUDENT IN AN ORGANIZED HEALTH CARE EDUCATION/TRAINING PROGRAM

## 2025-03-21 PROCEDURE — 20611 DRAIN/INJ JOINT/BURSA W/US: CPT | Performed by: STUDENT IN AN ORGANIZED HEALTH CARE EDUCATION/TRAINING PROGRAM

## 2025-03-21 RX ORDER — BUPIVACAINE HYDROCHLORIDE 2.5 MG/ML
2 INJECTION, SOLUTION INFILTRATION; PERINEURAL
Status: COMPLETED | OUTPATIENT
Start: 2025-03-21 | End: 2025-03-21

## 2025-03-21 RX ORDER — TRIAMCINOLONE ACETONIDE 40 MG/ML
40 INJECTION, SUSPENSION INTRA-ARTICULAR; INTRAMUSCULAR
Status: COMPLETED | OUTPATIENT
Start: 2025-03-21 | End: 2025-03-21

## 2025-03-21 RX ADMIN — BUPIVACAINE HYDROCHLORIDE 2 ML: 2.5 INJECTION, SOLUTION INFILTRATION; PERINEURAL at 09:30

## 2025-03-21 RX ADMIN — TRIAMCINOLONE ACETONIDE 40 MG: 40 INJECTION, SUSPENSION INTRA-ARTICULAR; INTRAMUSCULAR at 09:30

## 2025-03-21 NOTE — PROGRESS NOTES
Name: Madhu Bach      : 1940      MRN: [id number]  Encounter Provider: Gm Parson MD  Encounter Date: 3/21/2025   Encounter department: [facility] [place]  :  Assessment & Plan  Greater trochanteric pain syndrome of right lower extremity    Orders:    Large joint arthrocentesis: R greater trochanteric bursa    Acute right hip pain    Clinical exam and radiographic imaging reviewed with patient/parent today, with impression as per above. I have discussed with the patient the pathophysiology of this diagnosis and reviewed how the examination correlates with this diagnosis.    Prior imaging reviewed. Dr. Wallace note reviewed  Treatment options were discussed at length, including risks and benefits; after discussing these treatment options, the patient elected to proceed with USG greater trochanteric bursa CSI as per procedure note below.  Counseled we can repeat the injection every 3 months as needed in regards to pain control if it does not improve with Tylenol, topical NSAIDs, heat/ice therapy 20 minutes on/off.  Counseled other treatment modalities for greater trochanteric pain syndrome including formal PT but patient prefers to do home exercise and stretches which were provided today.  I counseled he may have some difficulty doing the stretches given his limited hip range of motion secondary to osteoarthritis of his right hip.    Orders:    Large joint arthrocentesis: R greater trochanteric bursa    Primary osteoarthritis of one hip, right    Orders:    Ambulatory Referral to Orthopedic Surgery    Arthritis of right hip    Orders:    Ambulatory Referral to Orthopedic Surgery    Return if symptoms worsen or fail to improve.      History of Present Illness   HPI  Madhu Bach is a 84 y.o. male who presents for right hip pain  History obtained from: patient; here today with significant other  Referred by Dr. Wallace from orthopedic surgery for patient to undergo USG greater  troch bursa CSI. Dr Wallace note reviewed from 3/20/2025.  Reports ongoing for the past few months progressively worsening.   Pain is over the lateral aspect of his hip.  Denies radiation of the pain.  Denies any low back pain concurrent.  Denies any N/T of his lower extremities.  Describes the pain as tight/aching and sharp.  Aggravated when transitioning from sitting to standing.  He actually feels alleviation when he is more active and riding his bicycle.  He does report stiffness of his hip especially when performing internal and external range of motion movements.  Denies any right groin pain.   States he had an injection over his lateral hip 20+ years ago which provided significant relief.  Has been trying Tylenol without relief.  Cannot take oral NSAIDs as he is on Eliquis.    Past Medical History:   Diagnosis Date    Bladder cancer (HCC)     Dilated aortic root (HCC)     Dilated cardiomyopathy (HCC)     DVT (deep venous thrombosis) (HCC)     GERD (gastroesophageal reflux disease)     Hyperlipidemia     Mitral regurgitation     Pacemaker     Pleural effusion     Rib fractures     SSS (sick sinus syndrome) (HCC)      Past Surgical History:   Procedure Laterality Date    BLADDER TUMOR EXCISION      CARDIAC ELECTROPHYSIOLOGY PROCEDURE N/A 10/21/2021    PPM generator change - dual. Surgeon: Edy Hyde MD    CARDIAC PACEMAKER PLACEMENT  06/09/2009 2009, generator replacement 10/2021    HERNIA REPAIR      X2    THORACOSCOPY VIDEO ASSISTED SURGERY (VATS) Left 11/21/2020    THORACOSCOPY VIDEO ASSISTED SURGERY (VATS), washout of hemothorax, decortication. Surgeon: Alex Eduardo MD;  Location: [facility];  Service: Thoracic    TONSILLECTOMY             Current Outpatient Medications on File Prior to Visit   Medication Sig Dispense Refill    Cetirizine HCl (ZyrTEC Allergy) 10 MG CAPS Take 10 mg by mouth in the morning      Cholecalciferol 50 MCG (2000 UT) CAPS Take by mouth      Coenzyme Q10 (CO Q 10 PO)  "Take by mouth      Cyanocobalamin (VITAMIN B 12 PO) Take by mouth      Eliquis 5 MG Take 1 tablet (5 mg total) by mouth 2 (two) times a day 180 tablet 3    folic acid (FOLVITE) 1 mg tablet Take 1 tablet by mouth in the morning      gabapentin (NEURONTIN) 100 mg capsule Take 100 mg by mouth 3 (three) times a day (Patient taking differently: Take 100 mg by mouth 3 (three) times a day Takes 5 pills a day)      Glucosamine-Chondroit-Vit C-Mn (Glucosamine Chondr 1500 Complx) CAPS Take 1,500 capsules by mouth in the morning      levothyroxine 25 mcg tablet       metoprolol succinate (TOPROL-XL) 50 mg 24 hr tablet Take 1 tablet (50 mg total) by mouth daily 90 tablet 3    naproxen (NAPROSYN) 500 mg tablet Take 1 tablet (500 mg total) by mouth 2 (two) times a day as needed for mild pain or moderate pain 30 tablet 0    ondansetron (ZOFRAN) 4 mg tablet Take 4 mg by mouth every 8 (eight) hours as needed      pantoprazole (PROTONIX) 40 mg tablet Take 40 mg by mouth daily      rOPINIRole (REQUIP) 2 mg tablet Take 2 mg by mouth 2 (two) times a day      simvastatin (ZOCOR) 40 mg tablet Take 40 mg by mouth daily at bedtime       Glucosamine-Chondroitin 2025-9454 MG/30ML LIQD Take by mouth (Patient not taking: Reported on 3/21/2025)      Multiple Vitamins-Minerals (PreserVision AREDS 2) CAPS Take 1 capsule by mouth in the morning (Patient not taking: Reported on 3/21/2025)      rOPINIRole (REQUIP) 1 mg tablet  (Patient not taking: Reported on 3/21/2025)       No current facility-administered medications on file prior to visit.      Social History     Tobacco Use    Smoking status: Never     Passive exposure: Never    Smokeless tobacco: Never   Vaping Use    Vaping status: Never Used   Substance and Sexual Activity    Alcohol use: Not Currently    Drug use: Not Currently    Sexual activity: Not Currently        Objective   Ht 5' 11\" (1.803 m)   Wt 84.5 kg (186 lb 4.8 oz)   BMI 25.98 kg/m²      Physical Exam          Constitutional:  " see vital signs  Gen: well-developed, normocephalic/atraumatic, well-groomed  Eyes: No inflammation or discharge of conjunctiva or lids; sclera clear   Pharynx: no inflammation, lesion, or mass of lips  Neck: supple, no masses, non-distended  MSK: no inflammation, lesion, mass, or clubbing of nails and digits except for other than mentioned below  SKIN: no visible rashes or skin lesions  Pulmonary/Chest: Effort normal. No respiratory distress.      Right Hip Exam     Tenderness   The patient is experiencing tenderness in the greater trochanter.    Range of Motion   External rotation:  30   Internal rotation:  10     Muscle Strength   Abduction: 5/5   Adduction: 5/5   Flexion: 5/5     Tests   YONAS: negative    Other   Erythema: absent    Comments:  FADIR: negative (but aggravates greater troch/lateral hip pain)  Log roll: negative    SLR negative                        Imaging Studies (I personally reviewed images in PACS and report):    CT pelvis wo contrast  Result Date: 3/19/2025  Narrative: CT PELVIS WITHOUT IV CONTRAST INDICATION: severe right hip pain. COMPARISON: CT chest abdomen pelvis 11/8/2020 TECHNIQUE: CT examination of the pelvis was performed without intravenous contrast. Multiplanar 2D reformatted images were created from the source data. This examination, like all CT scans performed in the [facility], was performed utilizing techniques to minimize radiation dose exposure, including the use of iterative reconstruction and automated exposure control. Radiation dose length product (DLP) for this visit: 370.48 mGy-cm . Enteric Contrast: Not administered. FINDINGS: REPRODUCTIVE ORGANS: Unremarkable for patient's age. URINARY BLADDER: Unremarkable. No stones in the urinary bladder or partially imaged distal ureters. VISUALIZED BOWEL: Colonic diverticulosis without findings of acute diverticulitis. APPENDIX: No findings to suggest appendicitis. ABDOMINOPELVIC CAVITY: No ascites. No  pneumoperitoneum. No lymphadenopathy. VESSELS: Atherosclerosis without aneurysm. ABDOMINOPELVIC WALL/INGUINAL REGIONS: Small fat-containing left inguinal hernia. BONES: No acute fracture or suspicious osseous lesion. Spinal degenerative changes. Moderate to severe degenerative changes at the hips bilaterally, right greater than left. There appears to be capsular calcification bilaterally along the femoral head and proximal neck.     Impression: No acute abnormality. Moderate to severe degenerative changes at the hips, right greater than left. Workstation performed: [id number]     Echo complete w/ contrast if indicated  Result Date: 3/3/2025  Narrative:   Left Ventricle: Left ventricular cavity size is mildly dilated. The left ventricular ejection fraction is 37%. Systolic function is moderately reduced. There is moderate global hypokinesis.   Right Ventricle: Systolic function is mildly reduced.   Left Atrium: The atrium is mildly dilated.   Right Atrium: The atrium is mildly dilated.   Mitral Valve: There is moderate regurgitation.   Pulmonic Valve: There is mild regurgitation.   Pericardium: There is a trivial pericardial effusion posterior to the heart.       Large joint arthrocentesis: R greater trochanteric bursa  Universal Protocol:  procedure performed by consultantConsent: Verbal consent obtained.  Risks and benefits: risks, benefits and alternatives were discussed  Consent given by: patient  Patient understanding: patient states understanding of the procedure being performed  Site marked: the operative site was marked  Radiology Images displayed and confirmed. If images not available, report reviewed: imaging studies available  Required items: required blood products, implants, devices, and special equipment available  Patient identity confirmed: verbally with patient  Supporting Documentation  Indications: pain and diagnostic evaluation   Procedure Details  Location: hip - R greater trochanteric  "bursa  Preparation: Patient was prepped and draped in the usual sterile fashion  Needle size: 22 G (3.5 inch spinal needle)  Ultrasound guidance: yes  Approach: lateral (LAX approach with curvilinear U/S probe)  Medications administered: 40 mg triamcinolone acetonide 40 mg/mL; 2 mL bupivacaine 0.25 %    Patient tolerance: patient tolerated the procedure well with no immediate complications  Dressing:  Sterile dressing applied              -----------------------------------------------------------------  Portions of the record may have been created with voice recognition software. Occasional wrong word or \"sound a like\" substitutions may have occurred due to the inherent limitations of voice recognition software. Read the chart carefully and recognize, using context, where substitutions have occurred.     "

## 2025-03-31 NOTE — ASSESSMENT & PLAN NOTE
Patient has a history of nonischemic cardiomyopathy  Patient underwent cardiac catheterization in 2009 which showed no evidence of coronary artery disease  He has been noted to have abnormal stress echocardiogram 08/31/2017 with mild basal inferior and inferolateral hypokinesis  Patient did not undergo cardiac catheterization at that time due to his excellent functional capacity and prior history of abnormal stress test prior to his cardiac catheterization  Ideally patient should be on long acting beta blocker and ACE-I/ARB but his blood pressure over the years has been limiting  Will discuss adding low dose beta blocker at follow up  [de-identified] : WC right wrist DOI 3/4/25  3/31/25: The patient returns today for repeat evaluation of his right wrist, reports he is doing okay, has been having soreness within the area.  03/17/2025 MEGHA HOOD is a 54 year old male here today for: Location: right wrist  Complaint: The patient presents to the office today for evaluation of right wrist pain after a work-related injury.  He notes that he fell from a ladder while at work on 3/4.  He noted immediate pain and swelling and was later seen in the ER where he was placed in a splint with instructions to follow-up as an outpatient.  He later saw another hand surgeon who recommended surgical management.  He is here today for a second opinion.  Symptom onset: 3/4/25 Prior treatments: splint Hand Dominance:  left  Occupation: superintendent  PMH: HTN Allergies: NKDA

## 2025-04-04 ENCOUNTER — OFFICE VISIT (OUTPATIENT)
Dept: CARDIOLOGY CLINIC | Facility: CLINIC | Age: 85
End: 2025-04-04
Payer: MEDICARE

## 2025-04-04 VITALS
OXYGEN SATURATION: 97 % | SYSTOLIC BLOOD PRESSURE: 114 MMHG | BODY MASS INDEX: 24.92 KG/M2 | DIASTOLIC BLOOD PRESSURE: 50 MMHG | WEIGHT: 178 LBS | TEMPERATURE: 97.2 F | HEART RATE: 78 BPM | HEIGHT: 71 IN

## 2025-04-04 DIAGNOSIS — Z86.79 HISTORY OF TACHYCARDIA-BRADYCARDIA SYNDROME: ICD-10-CM

## 2025-04-04 DIAGNOSIS — I77.810 DILATED AORTIC ROOT (HCC): ICD-10-CM

## 2025-04-04 DIAGNOSIS — I48.0 PAROXYSMAL ATRIAL FIBRILLATION (HCC): ICD-10-CM

## 2025-04-04 DIAGNOSIS — I34.0 NONRHEUMATIC MITRAL VALVE REGURGITATION: ICD-10-CM

## 2025-04-04 DIAGNOSIS — E78.2 MIXED HYPERLIPIDEMIA: ICD-10-CM

## 2025-04-04 DIAGNOSIS — I42.0 DILATED CARDIOMYOPATHY (HCC): Primary | ICD-10-CM

## 2025-04-04 DIAGNOSIS — Z95.0 PRESENCE OF CARDIAC PACEMAKER: ICD-10-CM

## 2025-04-04 PROCEDURE — 99214 OFFICE O/P EST MOD 30 MIN: CPT | Performed by: NURSE PRACTITIONER

## 2025-04-04 PROCEDURE — 93000 ELECTROCARDIOGRAM COMPLETE: CPT | Performed by: NURSE PRACTITIONER

## 2025-04-04 RX ORDER — METOPROLOL SUCCINATE 50 MG/1
50 TABLET, EXTENDED RELEASE ORAL 2 TIMES DAILY
Qty: 180 TABLET | Refills: 3 | Status: SHIPPED | OUTPATIENT
Start: 2025-04-04

## 2025-04-04 NOTE — ASSESSMENT & PLAN NOTE
Lipid panel 8/21/2024: C 150. T 125. H 46. L 79.  Pt remains on simvastatin 40 mg daily  Goal LDL < 100  Currently complains of bilateral leg pain/weakness. Trial statin hold and will monitor response after 2 weeks.

## 2025-04-04 NOTE — ASSESSMENT & PLAN NOTE
Pacemaker device interrogation on 9/8/2021 reveals 11 episodes of atrial fibrillation, longest episode lasting 3 hours and 58 minutes, which was a new finding.  Patient was already on Eliquis 5mg BID for DVT treatment.   Advised that we will continue this anticoagulation long-term for stroke prevention.  He remains on metoprolol succinate 50 mg daily. Increase to BID dosing due to frequent PVC's.  ECG today shows an atrial paced rhythm with one PVC  < 1% AF burden on device interrogation this month.

## 2025-04-04 NOTE — ASSESSMENT & PLAN NOTE
Mild to moderate MR noted on HUSSEIN in 6/2020  Echocardiogram 2/2/2023 shows moderate MR.  Remains moderate on 3/2025 echo  He currently denies shortness of breath and does not exhibit volume overload.  Reviewed urgent s/s to report.

## 2025-04-04 NOTE — PROGRESS NOTES
Cardiology Follow Up    Madhu Bach  1940  [id number]  [facility] [place] [address]  [address]LO[address]  [facility] [address] [phone]    Madhu presents today for routine follow up of dilated cardiomyopathy, paroxysmal atrial fibrillation, sick sinus syndrome S/P pacemaker placement.     1. Dilated cardiomyopathy (HCC)  Assessment & Plan:  Patient has a history of reduced LVEF.  Stage 1A heart failure.  He had an abnormal stress test in 2009 and underwent cardiac cath at that time that revealed no obvious coronary disease.  Holter monitor in 2019 revealed occasional PAC's and rare PVC's  Echo 2/4/2024 shows LVEF 50-55%.  Echo 3/2025 with EF around 40%. Frequent PVC's noted on PPM interrogation.  Currently he is on metoprolol succinate 50 mg daily. Increase to BID dosing and will monitor PVC counter on PPM check in 1 month  He has not been started on ACE/ARB secondary to history of hypotension and episodic lightheadedness.  He appears euvolemic without diuretics.  No anginal complaints. Will monitor closely.  2. Paroxysmal atrial fibrillation (HCC)  Assessment & Plan:  Pacemaker device interrogation on 9/8/2021 reveals 11 episodes of atrial fibrillation, longest episode lasting 3 hours and 58 minutes, which was a new finding.  Patient was already on Eliquis 5mg BID for DVT treatment.   Advised that we will continue this anticoagulation long-term for stroke prevention.  He remains on metoprolol succinate 50 mg daily. Increase to BID dosing due to frequent PVC's.  ECG today shows an atrial paced rhythm with one PVC  < 1% AF burden on device interrogation this month.  Orders:  -     POCT ECG  -     Magnesium; Future  -     metoprolol succinate (TOPROL-XL) 50 mg 24 hr tablet; Take 1 tablet (50 mg total) by mouth 2 (two) times a day  3. History of tachycardia-bradycardia syndrome  Assessment & Plan:  History of sick sinus syndrome resulting in pacemaker  insertion in June of 2009.  [facility] took over device management.  Device interrogation on 10/14/2021 revealed device at [facility].  Pt underwent device generator replacement by Dr. Hyde on 10/21/21.   4. Presence of cardiac pacemaker  Assessment & Plan:  See discussion under tachy-cinda syndrome  [facility] Device clinic managing  5. Nonrheumatic mitral valve regurgitation  Assessment & Plan:  Mild to moderate MR noted on HUSSEIN in 6/2020  Echocardiogram 2/2/2023 shows moderate MR.  Remains moderate on 3/2025 echo  He currently denies shortness of breath and does not exhibit volume overload.  Reviewed urgent s/s to report.  6. Dilated aortic root (HCC)  Assessment & Plan:  Aortic root has measured 3.8-4cm on prior imaging  3.6 cm aortic root noted on both 2024 and 2025 echocardiograms.  7. Mixed hyperlipidemia  Assessment & Plan:  Lipid panel 8/21/2024: C 150. T 125. H 46. L 79.  Pt remains on simvastatin 40 mg daily  Goal LDL < 100  Currently complains of bilateral leg pain/weakness. Trial statin hold and will monitor response after 2 weeks.     SOL Leung has a past medical history of sick sinus syndrome requiring PPM in 6/2009, dilated cardiomyopathy, fall with rib fracture and resulting hemothorax in 10/2020, and subsequent DVT on Eliquis.     He was initially following with Dr. Morfin at [facility]. He transferred to [facility] in spring 2021.     He met with Dr. Calderón on 8/27/2021 for consultation. His pacemaker device management was transitioned to [facility] at that time.      He followed up with me on 9/16/2021 after evaluation at [facility] for left sided chest pain. Cardiac work up was unrevealing. He denied any recurrent chest pain at follow up. At that time his device interrogation revealed 4 episodes of NSVT and 11 episodes of atrial fibrillation with the longest episode lasting 3 hrs and 58 minutes. Metoprolol succinate was initiated. He was advised that his Eliquis will continue  long-term for stroke prevention. He was asymptomatic with the episodes.     His device interrogation on 10/14/2021 revealed his pacemaker was at [facility]. He met with Dr. Hyde for consultation on that date. He underwent pacemaker generator replacement on 10/21/2021 at [facility] by Dr. Hyde.    Echo and stress test were ordered in 2/2023 due to NSVT on his device interrogation. Metoprolol was increased to 50 mg daily.   Echocardiogram 2/6/2023 showed LVEF 50-55% with mild-mod LVH, mod MR.  Exercise nuclear stress test 3/21/2023 showed no perfused defects with Gated EF 40%.     4/4/2025: Jai presents for routine follow up. His ECG today shows an atrial paced rhythm with one PVC. His device interrogation 3/30/25 shows an increase in his PVC burden to 177/hour. He is asymptomatic. Labs were done in January. NO recent magnesium was checked. He reports an ongoing issue with restless and painful legs. He sees Dr. Zapata for this and is now on gabapentin and Requip. He continues on simvastatin which he has taken for many years. He bikes 12 miles every day and reports excellent activity tolerance. He remains without symptoms of heart failure.     Medical Problems       Problem List       Dilated cardiomyopathy (HCC)    Paroxysmal atrial fibrillation (HCC)    MR (mitral regurgitation)    Dilated aortic root (HCC)    History of tachycardia-bradycardia syndrome    Presence of cardiac pacemaker    Hyperlipidemia    Non-seasonal allergic rhinitis    Closed fracture of multiple ribs of left side with routine healing    Bladder tumor    Normocytic anemia    History of venous thromboembolism    Other specified hypothyroidism    BPH (benign prostatic hyperplasia)    Gastroesophageal reflux disease        Past Medical History:   Diagnosis Date    Bladder cancer (HCC)     Dilated aortic root (HCC)     Dilated cardiomyopathy (HCC)     DVT (deep venous thrombosis) (HCC)     GERD (gastroesophageal reflux disease)      Hyperlipidemia     Mitral regurgitation     Pacemaker     Pleural effusion     Rib fractures     SSS (sick sinus syndrome) ([facility])      Social History     Socioeconomic History    Marital status: /Civil Union     Spouse name: Not on file    Number of children: Not on file    Years of education: Not on file    Highest education level: Not on file   Occupational History    Not on file   Tobacco Use    Smoking status: Never     Passive exposure: Never    Smokeless tobacco: Never   Vaping Use    Vaping status: Never Used   Substance and Sexual Activity    Alcohol use: Not Currently    Drug use: Not Currently    Sexual activity: Not Currently   Other Topics Concern    Not on file   Social History Narrative    Not on file     Social Drivers of Health     Financial Resource Strain: Not on file   Food Insecurity: Not on file   Transportation Needs: Not on file   Physical Activity: Not on file   Stress: Not on file   Social Connections: Unknown (6/18/2024)    Received from [employer]     How often do you feel lonely or isolated from those around you? (Adult - for ages 18 years and over): Not on file   Intimate Partner Violence: Not on file   Housing Stability: Not on file      Family History   Problem Relation Age of Onset    Lung cancer Mother     Colon cancer Father     COPD Sister      Past Surgical History:   Procedure Laterality Date    BLADDER TUMOR EXCISION      CARDIAC ELECTROPHYSIOLOGY PROCEDURE N/A 10/21/2021    PPM generator change - dual. Surgeon: Edy Hyde MD    CARDIAC PACEMAKER PLACEMENT  06/09/2009 2009, generator replacement 10/2021    HERNIA REPAIR      X2    THORACOSCOPY VIDEO ASSISTED SURGERY (VATS) Left 11/21/2020    THORACOSCOPY VIDEO ASSISTED SURGERY (VATS), washout of hemothorax, decortication. Surgeon: Alex Eduardo MD;  Location: [facility];  Service: Thoracic    TONSILLECTOMY         Current Outpatient Medications:     Cetirizine HCl (ZyrTEC Allergy) 10 MG  CAPS, Take 10 mg by mouth in the morning, Disp: , Rfl:     Cholecalciferol 50 MCG (2000 UT) CAPS, Take by mouth, Disp: , Rfl:     Coenzyme Q10 (CO Q 10 PO), Take by mouth, Disp: , Rfl:     Cyanocobalamin (VITAMIN B 12 PO), Take by mouth, Disp: , Rfl:     Eliquis 5 MG, Take 1 tablet (5 mg total) by mouth 2 (two) times a day, Disp: 180 tablet, Rfl: 3    folic acid (FOLVITE) 1 mg tablet, Take 1 tablet by mouth in the morning, Disp: , Rfl:     gabapentin (NEURONTIN) 100 mg capsule, Take 100 mg by mouth 3 (three) times a day, Disp: , Rfl:     Glucosamine-Chondroit-Vit C-Mn (Glucosamine Chondr 1500 Complx) CAPS, Take 1,500 capsules by mouth in the morning, Disp: , Rfl:     levothyroxine 25 mcg tablet, , Disp: , Rfl:     metoprolol succinate (TOPROL-XL) 50 mg 24 hr tablet, Take 1 tablet (50 mg total) by mouth 2 (two) times a day, Disp: 180 tablet, Rfl: 3    Multiple Vitamins-Minerals (PreserVision AREDS 2) CAPS, Take 1 capsule by mouth in the morning, Disp: , Rfl:     ondansetron (ZOFRAN) 4 mg tablet, Take 4 mg by mouth every 8 (eight) hours as needed, Disp: , Rfl:     pantoprazole (PROTONIX) 40 mg tablet, Take 40 mg by mouth daily, Disp: , Rfl:     rOPINIRole (REQUIP) 2 mg tablet, Take 2 mg by mouth 2 (two) times a day, Disp: , Rfl:   Allergies   Allergen Reactions    Penicillins Anaphylaxis and Hives       Labs:     Chemistry        Component Value Date/Time    K 4.9 01/06/2025 0934    K 4.1 09/25/2024 0614     01/06/2025 0934     09/09/2024 1115    CO2 30 01/06/2025 0934    CO2 29 09/09/2024 1115    BUN 16 01/06/2025 0934    BUN 19 09/09/2024 1115    CREATININE 1.03 01/06/2025 0934    CREATININE 0.88 09/09/2024 1115        Component Value Date/Time    CALCIUM 9.4 01/06/2025 0934    CALCIUM 9.4 09/09/2024 1115    ALKPHOS 44 01/06/2025 0934    ALKPHOS 49 09/09/2024 1115    AST 20 01/06/2025 0934    AST 23 09/09/2024 1115    ALT 19 01/06/2025 0934    ALT 23 09/09/2024 1115        Lipid panel 1/6/2025: C 149. T  158. H 43. L 74.     Cardiac testing:  ECG 4/4/2025: Atrial paced rhythm with prolonged AV conduction. One PVC. Rate 79 bpm.     Cardiac EP device report 3/20/2025   MDT D-PM/ACTIVE SYSTEM IS MRI CONDITIONAL CARELINK TRANSMISSION: BATTERY VOLTAGE ADEQUATE (8.8 YR). AP 89.0%.  6.9%. ALL LEAD PARAMETERS WITHIN NORMAL LIMITS. 1 EPISODE OF AF (EPISODE INITIATION NOT DEFINED) W/ BURDEN OF <0.1% SINCE 12/19/24. AVG VHR IN AF @ 78 BPM. 177.0 PVC'S/H. PT TAKES ELIQUIS AND METOPROLOL SUCCINATE. NORMAL DEVICE FUNCTION.    Echo 3/3/2025:  LVEF reported at 37%. Visually appears to be closer to 40-45%.  Mod MR.  Aortic root 3.6 cm. Ascending aorta 3.4 cm.     Echo complete 2/5/2024  LVEF 50-54%. Mild LVH. Grade 1 DD.  Mod MR. Mild TR. PASP 36mmHg.  Aortic root 3.6 cm.     ECG 11/28/2023: Ventricular paced rhythm. Rate 66 bpm.     Exercise nuclear stress test 3/21/2023:  Billy protocol. Achieved 9.3 METs and 80% MPHR. No perfusion defects. Gated EF 40%.     Echocardiogram 2/6/2023:  EF 50-55%. Mild-mod LVH. Mild diastolic dysfunction.  RV enlarged with normal function.  Trace AI. Mod MR. Mild TR with PASP 30 mmHg.  Aortic root 3.9 cm, ascending aorta 3.2 cm.     ECG 10/29/2021: atrial paced rhythm with prolonged AV conduction with PAC, right bundle branch block, left posterior fascicular block, T wave abnormality, rate 69     Exercise nuclear stress test 9/3/2021:  Duration of exercise was 7 min and 0 sec. Target heart rate was achieved. There was no chest pain during stress. -  Gated SPECT: The calculated left ventricular ejection fraction was 45 %. There was mild global left ventricular hypokinesis. IMPRESSIONS: No evidence of ischemia/myocardium at risk. Inferior wall perfusion abnormality noted which may represent attenuation artifact given reasonable wall thickening in this segment. Low risk perfusion findings. Mildly reduced LV function suggested on gated analysis.       Echocardiogram 8/6/2021:  EF 50% Grade 1 diastolic  "dysfunction. Mild to moderate MR. Mild TR. Trace WI. Aortic root 3.5cm, ascending aorta 3.6cm. Compared to prior study 5/27/2020 the mitral regurgitation was previously reported to be possibly severe but appears at most moderate on this study. No other significant changes.     HUSSEIN 6/17/2020:  EF 40-45%. Left atrium mildly dilated. Mild to moderate MR.    Review of Systems   Constitutional: Negative.   HENT: Negative.     Cardiovascular:  Negative for chest pain, dyspnea on exertion, irregular heartbeat, leg swelling, near-syncope, orthopnea and palpitations.   Respiratory:  Negative for cough and snoring.    Endocrine: Negative.    Skin: Negative.    Musculoskeletal:  Positive for muscle weakness and myalgias.   Gastrointestinal: Negative.    Genitourinary: Negative.    Neurological: Negative.    Psychiatric/Behavioral: Negative.         Vitals:    04/04/25 0800   BP:    Pulse: 78   Temp:    SpO2:      Vitals:    04/04/25 0751   Weight: 80.7 kg (178 lb)     Height: 5' 11\" (180.3 cm)   Body mass index is 24.83 kg/m².    Physical Exam  Vitals and nursing note reviewed.   Constitutional:       General: He is not in acute distress.     Appearance: He is well-developed. He is not diaphoretic.   HENT:      Head: Normocephalic and atraumatic.   Neck:      Vascular: No carotid bruit or JVD.   Cardiovascular:      Rate and Rhythm: Normal rate and regular rhythm. Occasional Extrasystoles are present.     Pulses: Intact distal pulses.      Heart sounds: Normal heart sounds, S1 normal and S2 normal. No murmur heard.     No friction rub. No gallop.      Comments: No edema  Pulmonary:      Effort: Pulmonary effort is normal. No respiratory distress.      Breath sounds: Normal breath sounds.   Abdominal:      General: There is no distension.      Palpations: Abdomen is soft.      Tenderness: There is no abdominal tenderness.   Skin:     General: Skin is warm and dry.      Findings: No rash.   Neurological:      Mental Status: He " is alert and oriented to person, place, and time.   Psychiatric:         Behavior: Behavior normal.

## 2025-04-04 NOTE — PATIENT INSTRUCTIONS
You are having more premature ventricular contractions (PVC's). Please get a magnesium level. Increase metoprolol to 50 mg twice daily.  We will recheck your pacemaker to monitor the PVC's in May.  Stop your simvastatin and call in 2 weeks to let me know if you noticed any change in your legs.   Contact our office with any chest discomfort, shortness of breath, lightheadedness

## 2025-04-04 NOTE — ASSESSMENT & PLAN NOTE
Aortic root has measured 3.8-4cm on prior imaging  3.6 cm aortic root noted on both 2024 and 2025 echocardiograms.

## 2025-04-04 NOTE — ASSESSMENT & PLAN NOTE
Patient has a history of reduced LVEF.  Stage 1A heart failure.  He had an abnormal stress test in 2009 and underwent cardiac cath at that time that revealed no obvious coronary disease.  Holter monitor in 2019 revealed occasional PAC's and rare PVC's  Echo 2/4/2024 shows LVEF 50-55%.  Echo 3/2025 with EF around 40%. Frequent PVC's noted on PPM interrogation.  Currently he is on metoprolol succinate 50 mg daily. Increase to BID dosing and will monitor PVC counter on PPM check in 1 month  He has not been started on ACE/ARB secondary to history of hypotension and episodic lightheadedness.  He appears euvolemic without diuretics.  No anginal complaints. Will monitor closely.

## 2025-04-17 ENCOUNTER — NURSE TRIAGE (OUTPATIENT)
Age: 85
End: 2025-04-17

## 2025-04-17 RX ORDER — SIMVASTATIN 40 MG
40 TABLET ORAL
COMMUNITY

## 2025-04-17 NOTE — TELEPHONE ENCOUNTER
Patient stopped the Simvastatin as advised at office visit on 4/4 to see if it would improve restless leg symptoms.    Spouse called today to let you know there has been no change since stopping the med but patient is agreeable to a change to a different statin if recommended.

## 2025-04-17 NOTE — TELEPHONE ENCOUNTER
"Reason for Disposition   Caller has NON-URGENT medicine question about med that PCP or specialist prescribed and triager unable to answer question    Answer Assessment - Initial Assessment Questions  1. NAME of MEDICINE: \"What medicine(s) are you calling about?\"      Simvastatin  2. QUESTION: \"What is your question?\" (e.g., double dose of medicine, side effect)      Replacement cholesterol med / statin   3. PRESCRIBER: \"Who prescribed the medicine?\" Reason: if prescribed by specialist, call should be referred to that group.      Cardiology    Protocols used: Medication Question Call-Adult-[place]    "

## 2025-06-18 ENCOUNTER — HOSPITAL ENCOUNTER (INPATIENT)
Facility: HOSPITAL | Age: 85
LOS: 1 days | Discharge: HOME | DRG: 312 | End: 2025-06-20
Attending: EMERGENCY MEDICINE | Admitting: FAMILY MEDICINE
Payer: MEDICARE

## 2025-06-18 ENCOUNTER — OFFICE VISIT (OUTPATIENT)
Dept: URGENT CARE | Facility: MEDICAL CENTER | Age: 85
End: 2025-06-18
Payer: MEDICARE

## 2025-06-18 ENCOUNTER — APPOINTMENT (EMERGENCY)
Dept: CT IMAGING | Facility: HOSPITAL | Age: 85
End: 2025-06-18
Payer: MEDICARE

## 2025-06-18 ENCOUNTER — APPOINTMENT (EMERGENCY)
Dept: RADIOLOGY | Facility: HOSPITAL | Age: 85
DRG: 312 | End: 2025-06-18
Payer: MEDICARE

## 2025-06-18 ENCOUNTER — APPOINTMENT (EMERGENCY)
Dept: RADIOLOGY | Facility: HOSPITAL | Age: 85
End: 2025-06-18
Payer: MEDICARE

## 2025-06-18 ENCOUNTER — HOSPITAL ENCOUNTER (EMERGENCY)
Facility: HOSPITAL | Age: 85
Discharge: HOME | End: 2025-06-18
Attending: EMERGENCY MEDICINE
Payer: MEDICARE

## 2025-06-18 VITALS
RESPIRATION RATE: 22 BRPM | SYSTOLIC BLOOD PRESSURE: 110 MMHG | WEIGHT: 178 LBS | BODY MASS INDEX: 24.92 KG/M2 | OXYGEN SATURATION: 95 % | DIASTOLIC BLOOD PRESSURE: 60 MMHG | HEART RATE: 71 BPM | HEIGHT: 71 IN | TEMPERATURE: 97.5 F

## 2025-06-18 VITALS
WEIGHT: 178 LBS | HEART RATE: 60 BPM | TEMPERATURE: 97.7 F | DIASTOLIC BLOOD PRESSURE: 77 MMHG | BODY MASS INDEX: 24.92 KG/M2 | OXYGEN SATURATION: 95 % | SYSTOLIC BLOOD PRESSURE: 162 MMHG | RESPIRATION RATE: 14 BRPM | HEIGHT: 71 IN

## 2025-06-18 DIAGNOSIS — I48.0 PAROXYSMAL ATRIAL FIBRILLATION (HCC): ICD-10-CM

## 2025-06-18 DIAGNOSIS — Z86.718 HISTORY OF DVT (DEEP VEIN THROMBOSIS): ICD-10-CM

## 2025-06-18 DIAGNOSIS — R20.0 BILATERAL LEG NUMBNESS: Primary | ICD-10-CM

## 2025-06-18 DIAGNOSIS — Z79.01 CHRONIC ANTICOAGULATION: ICD-10-CM

## 2025-06-18 DIAGNOSIS — R42 DIZZINESS: Primary | ICD-10-CM

## 2025-06-18 DIAGNOSIS — I48.0 PAROXYSMAL A-FIB (HCC): ICD-10-CM

## 2025-06-18 DIAGNOSIS — I65.21 INTERNAL CAROTID ARTERY STENOSIS, RIGHT: ICD-10-CM

## 2025-06-18 DIAGNOSIS — R20.2 PARESTHESIAS: ICD-10-CM

## 2025-06-18 DIAGNOSIS — R29.90 STROKE-LIKE SYMPTOM: ICD-10-CM

## 2025-06-18 DIAGNOSIS — R53.1 GENERALIZED WEAKNESS: ICD-10-CM

## 2025-06-18 DIAGNOSIS — R42 VERTIGO: ICD-10-CM

## 2025-06-18 LAB
2HR DELTA HS TROPONIN: 0 NG/L
2HR DELTA HS TROPONIN: 0 NG/L
ALBUMIN SERPL BCG-MCNC: 4 G/DL (ref 3.5–5)
ALBUMIN SERPL BCG-MCNC: 4.2 G/DL (ref 3.5–5)
ALP SERPL-CCNC: 47 U/L (ref 34–104)
ALP SERPL-CCNC: 51 U/L (ref 34–104)
ALT SERPL W P-5'-P-CCNC: 19 U/L (ref 7–52)
ALT SERPL W P-5'-P-CCNC: 20 U/L (ref 7–52)
ANION GAP SERPL CALCULATED.3IONS-SCNC: 6 MMOL/L (ref 4–13)
ANION GAP SERPL CALCULATED.3IONS-SCNC: 7 MMOL/L (ref 4–13)
APTT PPP: 29 SECONDS (ref 23–34)
APTT PPP: 31 SECONDS (ref 23–34)
AST SERPL W P-5'-P-CCNC: 21 U/L (ref 13–39)
AST SERPL W P-5'-P-CCNC: 23 U/L (ref 13–39)
BACTERIA UR QL AUTO: ABNORMAL /HPF
BASOPHILS # BLD AUTO: 0.09 THOUSANDS/ÂΜL (ref 0–0.1)
BASOPHILS # BLD AUTO: 0.09 THOUSANDS/ÂΜL (ref 0–0.1)
BASOPHILS NFR BLD AUTO: 1 % (ref 0–1)
BASOPHILS NFR BLD AUTO: 2 % (ref 0–1)
BILIRUB SERPL-MCNC: 0.49 MG/DL (ref 0.2–1)
BILIRUB SERPL-MCNC: 0.62 MG/DL (ref 0.2–1)
BILIRUB UR QL STRIP: NEGATIVE
BNP SERPL-MCNC: 124 PG/ML (ref 0–100)
BUN SERPL-MCNC: 23 MG/DL (ref 5–25)
BUN SERPL-MCNC: 23 MG/DL (ref 5–25)
CALCIUM SERPL-MCNC: 9.2 MG/DL (ref 8.4–10.2)
CALCIUM SERPL-MCNC: 9.5 MG/DL (ref 8.4–10.2)
CARDIAC TROPONIN I PNL SERPL HS: 10 NG/L (ref ?–50)
CARDIAC TROPONIN I PNL SERPL HS: 10 NG/L (ref ?–50)
CARDIAC TROPONIN I PNL SERPL HS: 9 NG/L (ref ?–50)
CARDIAC TROPONIN I PNL SERPL HS: 9 NG/L (ref ?–50)
CHLORIDE SERPL-SCNC: 104 MMOL/L (ref 96–108)
CHLORIDE SERPL-SCNC: 105 MMOL/L (ref 96–108)
CK SERPL-CCNC: 75 U/L (ref 39–308)
CLARITY UR: CLEAR
CO2 SERPL-SCNC: 27 MMOL/L (ref 21–32)
CO2 SERPL-SCNC: 28 MMOL/L (ref 21–32)
COLOR UR: YELLOW
CREAT SERPL-MCNC: 0.99 MG/DL (ref 0.6–1.3)
CREAT SERPL-MCNC: 1.11 MG/DL (ref 0.6–1.3)
EOSINOPHIL # BLD AUTO: 0.17 THOUSAND/ÂΜL (ref 0–0.61)
EOSINOPHIL # BLD AUTO: 0.19 THOUSAND/ÂΜL (ref 0–0.61)
EOSINOPHIL NFR BLD AUTO: 3 % (ref 0–6)
EOSINOPHIL NFR BLD AUTO: 3 % (ref 0–6)
ERYTHROCYTE [DISTWIDTH] IN BLOOD BY AUTOMATED COUNT: 13.3 % (ref 11.6–15.1)
ERYTHROCYTE [DISTWIDTH] IN BLOOD BY AUTOMATED COUNT: 13.4 % (ref 11.6–15.1)
GFR SERPL CREATININE-BSD FRML MDRD: 59 ML/MIN/1.73SQ M
GFR SERPL CREATININE-BSD FRML MDRD: 68 ML/MIN/1.73SQ M
GLUCOSE SERPL-MCNC: 100 MG/DL (ref 65–140)
GLUCOSE SERPL-MCNC: 103 MG/DL (ref 65–140)
GLUCOSE UR STRIP-MCNC: NEGATIVE MG/DL
HCT VFR BLD AUTO: 40.1 % (ref 36.5–49.3)
HCT VFR BLD AUTO: 40.2 % (ref 36.5–49.3)
HGB BLD-MCNC: 13.3 G/DL (ref 12–17)
HGB BLD-MCNC: 13.4 G/DL (ref 12–17)
HGB UR QL STRIP.AUTO: NEGATIVE
HYALINE CASTS #/AREA URNS LPF: ABNORMAL /LPF
IMM GRANULOCYTES # BLD AUTO: 0.02 THOUSAND/UL (ref 0–0.2)
IMM GRANULOCYTES # BLD AUTO: 0.02 THOUSAND/UL (ref 0–0.2)
IMM GRANULOCYTES NFR BLD AUTO: 0 % (ref 0–2)
IMM GRANULOCYTES NFR BLD AUTO: 0 % (ref 0–2)
INR PPP: 1.04 (ref 0.85–1.19)
INR PPP: 1.09 (ref 0.85–1.19)
KETONES UR STRIP-MCNC: NEGATIVE MG/DL
LEUKOCYTE ESTERASE UR QL STRIP: ABNORMAL
LYMPHOCYTES # BLD AUTO: 2.13 THOUSANDS/ÂΜL (ref 0.6–4.47)
LYMPHOCYTES # BLD AUTO: 2.36 THOUSANDS/ÂΜL (ref 0.6–4.47)
LYMPHOCYTES NFR BLD AUTO: 36 % (ref 14–44)
LYMPHOCYTES NFR BLD AUTO: 37 % (ref 14–44)
MAGNESIUM SERPL-MCNC: 2 MG/DL (ref 1.9–2.7)
MCH RBC QN AUTO: 30.1 PG (ref 26.8–34.3)
MCH RBC QN AUTO: 30.7 PG (ref 26.8–34.3)
MCHC RBC AUTO-ENTMCNC: 33.1 G/DL (ref 31.4–37.4)
MCHC RBC AUTO-ENTMCNC: 33.4 G/DL (ref 31.4–37.4)
MCV RBC AUTO: 91 FL (ref 82–98)
MCV RBC AUTO: 92 FL (ref 82–98)
MONOCYTES # BLD AUTO: 0.59 THOUSAND/ÂΜL (ref 0.17–1.22)
MONOCYTES # BLD AUTO: 0.62 THOUSAND/ÂΜL (ref 0.17–1.22)
MONOCYTES NFR BLD AUTO: 10 % (ref 4–12)
MONOCYTES NFR BLD AUTO: 10 % (ref 4–12)
MUCOUS THREADS UR QL AUTO: ABNORMAL
NEUTROPHILS # BLD AUTO: 2.96 THOUSANDS/ÂΜL (ref 1.85–7.62)
NEUTROPHILS # BLD AUTO: 3.06 THOUSANDS/ÂΜL (ref 1.85–7.62)
NEUTS SEG NFR BLD AUTO: 49 % (ref 43–75)
NEUTS SEG NFR BLD AUTO: 49 % (ref 43–75)
NITRITE UR QL STRIP: NEGATIVE
NON-SQ EPI CELLS URNS QL MICRO: ABNORMAL /HPF
NRBC BLD AUTO-RTO: 0 /100 WBCS
NRBC BLD AUTO-RTO: 0 /100 WBCS
PH UR STRIP.AUTO: 5.5 [PH]
PLATELET # BLD AUTO: 177 THOUSANDS/UL (ref 149–390)
PLATELET # BLD AUTO: 211 THOUSANDS/UL (ref 149–390)
PMV BLD AUTO: 9.9 FL (ref 8.9–12.7)
PMV BLD AUTO: 9.9 FL (ref 8.9–12.7)
POTASSIUM SERPL-SCNC: 4.4 MMOL/L (ref 3.5–5.3)
POTASSIUM SERPL-SCNC: 4.5 MMOL/L (ref 3.5–5.3)
PROT SERPL-MCNC: 6.8 G/DL (ref 6.4–8.4)
PROT SERPL-MCNC: 6.8 G/DL (ref 6.4–8.4)
PROT UR STRIP-MCNC: ABNORMAL MG/DL
PROTHROMBIN TIME: 14 SECONDS (ref 12.3–15)
PROTHROMBIN TIME: 14.6 SECONDS (ref 12.3–15)
RBC # BLD AUTO: 4.36 MILLION/UL (ref 3.88–5.62)
RBC # BLD AUTO: 4.42 MILLION/UL (ref 3.88–5.62)
RBC #/AREA URNS AUTO: ABNORMAL /HPF
SODIUM SERPL-SCNC: 138 MMOL/L (ref 135–147)
SODIUM SERPL-SCNC: 139 MMOL/L (ref 135–147)
SP GR UR STRIP.AUTO: 1.02 (ref 1–1.03)
TSH SERPL DL<=0.05 MIU/L-ACNC: 3.59 UIU/ML (ref 0.45–4.5)
UROBILINOGEN UR STRIP-ACNC: <2 MG/DL
WBC # BLD AUTO: 5.96 THOUSAND/UL (ref 4.31–10.16)
WBC # BLD AUTO: 6.34 THOUSAND/UL (ref 4.31–10.16)
WBC #/AREA URNS AUTO: ABNORMAL /HPF

## 2025-06-18 PROCEDURE — 99285 EMERGENCY DEPT VISIT HI MDM: CPT | Performed by: PHYSICIAN ASSISTANT

## 2025-06-18 PROCEDURE — 36415 COLL VENOUS BLD VENIPUNCTURE: CPT | Performed by: PHYSICIAN ASSISTANT

## 2025-06-18 PROCEDURE — 84484 ASSAY OF TROPONIN QUANT: CPT | Performed by: PHYSICIAN ASSISTANT

## 2025-06-18 PROCEDURE — 85025 COMPLETE CBC W/AUTO DIFF WBC: CPT | Performed by: EMERGENCY MEDICINE

## 2025-06-18 PROCEDURE — 82550 ASSAY OF CK (CPK): CPT | Performed by: PHYSICIAN ASSISTANT

## 2025-06-18 PROCEDURE — 70496 CT ANGIOGRAPHY HEAD: CPT

## 2025-06-18 PROCEDURE — G0463 HOSPITAL OUTPT CLINIC VISIT: HCPCS | Performed by: NURSE PRACTITIONER

## 2025-06-18 PROCEDURE — 93005 ELECTROCARDIOGRAM TRACING: CPT

## 2025-06-18 PROCEDURE — 85610 PROTHROMBIN TIME: CPT | Performed by: PHYSICIAN ASSISTANT

## 2025-06-18 PROCEDURE — 85610 PROTHROMBIN TIME: CPT | Performed by: EMERGENCY MEDICINE

## 2025-06-18 PROCEDURE — 70498 CT ANGIOGRAPHY NECK: CPT

## 2025-06-18 PROCEDURE — 83735 ASSAY OF MAGNESIUM: CPT | Performed by: PHYSICIAN ASSISTANT

## 2025-06-18 PROCEDURE — 71045 X-RAY EXAM CHEST 1 VIEW: CPT

## 2025-06-18 PROCEDURE — 99285 EMERGENCY DEPT VISIT HI MDM: CPT

## 2025-06-18 PROCEDURE — 99223 1ST HOSP IP/OBS HIGH 75: CPT | Performed by: NURSE PRACTITIONER

## 2025-06-18 PROCEDURE — 85730 THROMBOPLASTIN TIME PARTIAL: CPT | Performed by: EMERGENCY MEDICINE

## 2025-06-18 PROCEDURE — 81001 URINALYSIS AUTO W/SCOPE: CPT | Performed by: PHYSICIAN ASSISTANT

## 2025-06-18 PROCEDURE — 36415 COLL VENOUS BLD VENIPUNCTURE: CPT | Performed by: EMERGENCY MEDICINE

## 2025-06-18 PROCEDURE — 99285 EMERGENCY DEPT VISIT HI MDM: CPT | Performed by: EMERGENCY MEDICINE

## 2025-06-18 PROCEDURE — 82306 VITAMIN D 25 HYDROXY: CPT | Performed by: NURSE PRACTITIONER

## 2025-06-18 PROCEDURE — 80053 COMPREHEN METABOLIC PANEL: CPT | Performed by: PHYSICIAN ASSISTANT

## 2025-06-18 PROCEDURE — 85730 THROMBOPLASTIN TIME PARTIAL: CPT | Performed by: PHYSICIAN ASSISTANT

## 2025-06-18 PROCEDURE — 99205 OFFICE O/P NEW HI 60 MIN: CPT | Performed by: NURSE PRACTITIONER

## 2025-06-18 PROCEDURE — 80053 COMPREHEN METABOLIC PANEL: CPT | Performed by: EMERGENCY MEDICINE

## 2025-06-18 PROCEDURE — 84443 ASSAY THYROID STIM HORMONE: CPT | Performed by: PHYSICIAN ASSISTANT

## 2025-06-18 PROCEDURE — 82607 VITAMIN B-12: CPT | Performed by: NURSE PRACTITIONER

## 2025-06-18 PROCEDURE — 85025 COMPLETE CBC W/AUTO DIFF WBC: CPT | Performed by: PHYSICIAN ASSISTANT

## 2025-06-18 PROCEDURE — 84484 ASSAY OF TROPONIN QUANT: CPT | Performed by: EMERGENCY MEDICINE

## 2025-06-18 PROCEDURE — 83880 ASSAY OF NATRIURETIC PEPTIDE: CPT | Performed by: EMERGENCY MEDICINE

## 2025-06-18 RX ORDER — GABAPENTIN 100 MG/1
100 CAPSULE ORAL DAILY
Status: DISCONTINUED | OUTPATIENT
Start: 2025-06-19 | End: 2025-06-20 | Stop reason: HOSPADM

## 2025-06-18 RX ORDER — METOPROLOL SUCCINATE 50 MG/1
50 TABLET, EXTENDED RELEASE ORAL 2 TIMES DAILY
Status: DISCONTINUED | OUTPATIENT
Start: 2025-06-18 | End: 2025-06-19

## 2025-06-18 RX ORDER — GABAPENTIN 300 MG/1
300 CAPSULE ORAL EVERY EVENING
Status: DISCONTINUED | OUTPATIENT
Start: 2025-06-19 | End: 2025-06-20 | Stop reason: HOSPADM

## 2025-06-18 RX ORDER — PANTOPRAZOLE SODIUM 40 MG/1
40 TABLET, DELAYED RELEASE ORAL
Status: DISCONTINUED | OUTPATIENT
Start: 2025-06-19 | End: 2025-06-20 | Stop reason: HOSPADM

## 2025-06-18 RX ORDER — ATORVASTATIN CALCIUM 40 MG/1
40 TABLET, FILM COATED ORAL EVERY EVENING
Status: DISCONTINUED | OUTPATIENT
Start: 2025-06-18 | End: 2025-06-20 | Stop reason: HOSPADM

## 2025-06-18 RX ORDER — ROPINIROLE 1 MG/1
2 TABLET, FILM COATED ORAL 2 TIMES DAILY
Status: DISCONTINUED | OUTPATIENT
Start: 2025-06-18 | End: 2025-06-20 | Stop reason: HOSPADM

## 2025-06-18 RX ORDER — ASPIRIN 81 MG/1
81 TABLET, CHEWABLE ORAL DAILY
Status: DISCONTINUED | OUTPATIENT
Start: 2025-06-19 | End: 2025-06-20 | Stop reason: HOSPADM

## 2025-06-18 RX ORDER — LEVOTHYROXINE SODIUM 125 UG/1
125 TABLET ORAL
Status: DISCONTINUED | OUTPATIENT
Start: 2025-06-19 | End: 2025-06-20 | Stop reason: HOSPADM

## 2025-06-18 RX ORDER — GABAPENTIN 300 MG/1
300 CAPSULE ORAL
Status: DISCONTINUED | OUTPATIENT
Start: 2025-06-18 | End: 2025-06-20 | Stop reason: HOSPADM

## 2025-06-18 RX ADMIN — IOHEXOL 85 ML: 350 INJECTION, SOLUTION INTRAVENOUS at 14:41

## 2025-06-18 RX ADMIN — GABAPENTIN 300 MG: 300 CAPSULE ORAL at 21:49

## 2025-06-18 RX ADMIN — ATORVASTATIN CALCIUM 40 MG: 40 TABLET, FILM COATED ORAL at 21:49

## 2025-06-18 RX ADMIN — ROPINIROLE 2 MG: 1 TABLET, FILM COATED ORAL at 21:49

## 2025-06-18 RX ADMIN — METOPROLOL SUCCINATE 50 MG: 50 TABLET, EXTENDED RELEASE ORAL at 21:49

## 2025-06-18 RX ADMIN — APIXABAN 5 MG: 5 TABLET, FILM COATED ORAL at 21:49

## 2025-06-18 NOTE — PATIENT INSTRUCTIONS
You are being transferred to [facility] ED for higher level of care/evaluation and treatment  You are to follow up with your PCp after ED visit  You may be having a stroke so you will be going to the closed facility available.

## 2025-06-18 NOTE — PROGRESS NOTES
[facility]  Name: Madhu Bach      : 1940      MRN: [id number]  Encounter Provider: IVY Singh  Encounter Date: 2025   Encounter department: [facility] [place]  :  Assessment & Plan  Bilateral leg numbness         Chronic anticoagulation         Paroxysmal A-fib (HCC)         History of DVT (deep vein thrombosis)             Patient Instructions  Follow up with PCP in 3-5 days.  Proceed to  ER if symptoms worsen.    If tests are performed, our office will contact you with results only if changes need to made to the care plan discussed with you at the visit. You can review your full results on [facility].    You are being transferred to [facility] ED for higher level of care/evaluation and treatment  You are to follow up with your PCp after ED visit  You may be having a stroke so you will be going to the closed facility available.       Chief Complaint:   Chief Complaint   Patient presents with    Numbness     Pt felt his R foot numb while walking around the [place]. He then was unable to walk feeling unbalanced, tired and now feels lightheaded. Pts wife called the PCP and then brought him directly to urgent care.      History of Present Illness   This is an 84 year old male who's wife brings to care now with c/o right foot numbness while walking around the [place]. He then said he was unable to walk feeling unbalance and now feels lightheaded. He denies any slurred speech, confusion or weakness of UE.  HE states this all started about 1 hr ago.  Wife states she called PCP and was told to bring him to care now for evaluation.  Pt states he has hx of left DVT, PAF and is on eliquis.  Denies headache or injury.  PMH is listed and reviewed.                Review of Systems   Constitutional: Negative.    HENT: Negative.     Eyes: Negative.    Respiratory: Negative.     Cardiovascular: Negative.    Gastrointestinal: Negative.   "  Endocrine: Negative.    Genitourinary: Negative.    Musculoskeletal: Negative.    Skin: Negative.    Allergic/Immunologic: Negative.    Neurological:  Positive for weakness, light-headedness and numbness.        Off balance    Hematological: Negative.    Psychiatric/Behavioral: Negative.       Past Medical History   Past Medical History[1]  Past Surgical History[2]  Family History[3]  he reports that he has never smoked. He has never been exposed to tobacco smoke. He has never used smokeless tobacco. He reports that he does not currently use alcohol. He reports that he does not currently use drugs.  Current Outpatient Medications   Medication Instructions    Cholecalciferol 50 MCG (2000 UT) CAPS Take by mouth    Coenzyme Q10 (CO Q 10 PO) Take by mouth    Cyanocobalamin (VITAMIN B 12 PO) Take by mouth    Eliquis 5 mg, Oral, 2 times daily    folic acid (FOLVITE) 1 mg tablet 1 tablet, Daily    gabapentin (NEURONTIN) 100 mg, 3 times daily    Glucosamine-Chondroit-Vit C-Mn (Glucosamine Chondr 1500 Complx) CAPS 1,500 capsules, Daily    levothyroxine 25 mcg tablet No dose, route, or frequency recorded.    metoprolol succinate (TOPROL-XL) 50 mg, Oral, 2 times daily    Multiple Vitamins-Minerals (PreserVision AREDS 2) CAPS 1 capsule, Daily    ondansetron (ZOFRAN) 4 mg, Every 8 hours PRN    pantoprazole (PROTONIX) 40 mg, Daily    rOPINIRole (REQUIP) 2 mg, 2 times daily    simvastatin (ZOCOR) 40 mg, Daily at bedtime    ZyrTEC Allergy 10 mg, Daily   Allergies[4]     Objective   /60   Pulse 71   Temp 97.5 °F (36.4 °C)   Resp 22   Ht 5' 11\" (1.803 m)   Wt 80.7 kg (178 lb)   SpO2 95%   BMI 24.83 kg/m²      Physical Exam  Vitals and nursing note reviewed.   Constitutional:       General: He is not in acute distress.     Appearance: Normal appearance. He is normal weight. He is not ill-appearing, toxic-appearing or diaphoretic.   HENT:      Head: Normocephalic and atraumatic.      Nose: Nose normal.      Mouth/Throat: " "     Mouth: Mucous membranes are moist.     Eyes:      Extraocular Movements: Extraocular movements intact.      Pupils: Pupils are equal, round, and reactive to light.       Cardiovascular:      Rate and Rhythm: Normal rate and regular rhythm.      Pulses: Normal pulses.      Heart sounds: Normal heart sounds.   Pulmonary:      Effort: Pulmonary effort is normal.      Breath sounds: Normal breath sounds.     Musculoskeletal:         General: Normal range of motion.      Cervical back: Normal range of motion and neck supple.      Comments: Able to walk independently however unable to perform heel to toe due to imbalance.  PETERSON well.      Skin:     General: Skin is warm and dry.      Capillary Refill: Capillary refill takes less than 2 seconds.     Neurological:      Mental Status: He is alert and oriented to person, place, and time.      GCS: GCS eye subscore is 4. GCS verbal subscore is 5. GCS motor subscore is 6.      Sensory: No sensory deficit.      Comments: No facial droop, slurred speech.  No drift UE or LE B/L  Sensation intact  Unable to perform heel to toe due to imbalance   Able to walk independently    Psychiatric:         Mood and Affect: Mood normal.         Behavior: Behavior normal.         Thought Content: Thought content normal.         Judgment: Judgment normal.       Discussed with pt and wife that pt needs a higher level of care that care now is able to provide.  Explain that due to symptoms and past history he needs stroke work up.  Wife states she called PCP and was told to come here. Explained that care now does not have the capability of providing stroke work up and will need to send to nearest acute facility  [facility].  Wife agrees.    911 called   1:05 PM  EMS arrives and pt to care now.   Portions of the record may have been created with voice recognition software.  Occasional wrong word or \"sound a like\" substitutions may have occurred due to the inherent limitations of voice recognition " software.  Read the chart carefully and recognize, using context, where substitutions have occurred.       [1]   Past Medical History:  Diagnosis Date    Bladder cancer (HCC)     Dilated aortic root (HCC)     Dilated cardiomyopathy (HCC)     DVT (deep venous thrombosis) (HCC)     GERD (gastroesophageal reflux disease)     Hyperlipidemia     Mitral regurgitation     Pacemaker     Pleural effusion     Rib fractures     SSS (sick sinus syndrome) (HCC)    [2]   Past Surgical History:  Procedure Laterality Date    BLADDER TUMOR EXCISION      CARDIAC ELECTROPHYSIOLOGY PROCEDURE N/A 10/21/2021    PPM generator change - dual. Surgeon: Edy Hyde MD    CARDIAC PACEMAKER PLACEMENT  06/09/2009 2009, generator replacement 10/2021    HERNIA REPAIR      X2    THORACOSCOPY VIDEO ASSISTED SURGERY (VATS) Left 11/21/2020    THORACOSCOPY VIDEO ASSISTED SURGERY (VATS), washout of hemothorax, decortication. Surgeon: Alex Eduardo MD;  Location: [facility];  Service: Thoracic    TONSILLECTOMY     [3]   Family History  Problem Relation Name Age of Onset    Lung cancer Mother      Colon cancer Father      COPD Sister     [4]   Allergies  Allergen Reactions    Penicillins Anaphylaxis and Hives

## 2025-06-18 NOTE — ED PROVIDER NOTES
Time reflects when diagnosis was documented in both MDM as applicable and the Disposition within this note       Time User Action Codes Description Comment    6/18/2025  4:16 PM OlAnusha astorga Add [R42] Dizziness     6/18/2025  4:16 PM OlAnusha astorga Add [R20.2] Paresthesias     6/18/2025  4:16 PM Olving, Anusha Modify [R20.2] Paresthesias bilateral lower extremities    6/18/2025  4:18 PM OlAnusha astorga Add [I65.21] Internal carotid artery stenosis, right     6/18/2025  4:18 PM Olving, Anusha Modify [I65.21] Internal carotid artery stenosis, right Moderate right extracranial ICA stenosis          ED Disposition       ED Disposition   Discharge    Condition   Stable    Date/Time   Wed Jun 18, 2025  5:02 PM    Comment   Madhu KEVON Bach discharge to home/self care.                   Assessment & Plan       Medical Decision Making  85 yo male presenting for evaluation of paresthesias bilateral lower extremities now resolved.  NIH=0, non focal exam.  Also reports ongoing intermittent dizziness, lightheadedness.  Will obtain EKG, CXR, labs, urine.  Will proceed with CT imaging to further evaluate.  Will interrogate pacemaker.    Work up obtained as noted above.  EKG and serial troponins not c/w ACS.  No noted arrhythmias on telemetry monitoring. CXR does not reveal pneumonia, pneumothorax, vascular congestion or pleural effusion.  No noted leukocytosis, no anemia.  No infectious concerns.  No hypo or hyperglycemia.  Renal function within normal limits.  Electrolytes within normal limits.  UA not suggestive of infection.  CK normal limits.  TSH normal limits.  CTA head/neck - without acute findings.  Mild chronic microangiopathic ischemic changes and moderate right extracranial ICA stenosis.  Symptomatically pt felt improved.  We did discuss admission to the hospital for further evaluation and management which he declines.  We did discuss symptoms could be related to a TIA and further evaluation with MRI may be  needed. He wishes to go home and follow up with PCP.    Reviewed symptomatic management.  OTC meds reviewed.  Anticipatory guidance.  Advised recheck with PCP or return to ER as needed.  Strict return precautions outlined.  Patient and spouse voiced understanding and had no further questions.    Please refer to above ER course for further details/discussion.      Problems Addressed:  Dizziness: chronic illness or injury  Paresthesias: acute illness or injury    Amount and/or Complexity of Data Reviewed  External Data Reviewed: labs, radiology, ECG and notes.  Labs: ordered. Decision-making details documented in ED Course.  Radiology: ordered and independent interpretation performed. Decision-making details documented in ED Course.  ECG/medicine tests: ordered and independent interpretation performed. Decision-making details documented in ED Course.    Risk  OTC drugs.  Prescription drug management.  Decision regarding hospitalization.        ED Course as of 06/18/25 1932 Wed Jun 18, 2025   1359 WBC: 6.34   1359 Hemoglobin: 13.4   1359 Platelet Count: 211   1410 POCT INR: 1.09   1410 PROTIME: 14.6   1410 PTT: 31   1415 XR chest 1 view portable  Independently viewed and interpreted by me - no acute cardiopulmonary process, pacemaker, no significant interval change; pending official read.   1419 GLUCOSE: 103   1419 Creatinine: 1.11  Baseline appears between 0.9-1   1419 BUN: 23   1419 Sodium: 139   1419 Potassium: 4.5   1419 Chloride: 104   1419 Carbon Dioxide: 28   1420 ANION GAP: 7   1420 Calcium: 9.5   1420 AST: 23   1420 ALT: 20   1420 ALK PHOS: 51   1420 Total Protein: 6.8   1420 Albumin: 4.2   1420 Total Bilirubin: 0.62   1420 GFR, Calculated: 59   1420 Total CK: 75   1420 MAGNESIUM: 2.0   1423 hs TnI 0hr: 9  Value of 13 a year ago; not diagnostic of ACS however will require delta to exclude   1434 TSH 3RD GENERATON: 3.588   1434 Leukocytes, UA(!): Trace   1434 POCT URINE PROTEIN(!): Trace  UA otherwise not  suggestive of infection   1508 CTA head and neck with and without contrast  IMPRESSION:  CT brain: No acute intracranial abnormality. Mild chronic microangiopathic ischemic changes.     CTA head: Negative for large vessel intracranial occlusion. Moderate stenosis along the bilateral intracranial vertebral arteries.     CTA neck: Moderate right extracranial ICA stenosis. There is no hemodynamically significant left extracranial carotid stenosis. The cervical vertebral arteries are patent.   1517 Pt reassessed.  Continues to feel asymptomatic.  RN interrogating pacemaker.  Plan on repeat troponin.  Pt is declining of admission at this point.  Will trial ambulation.  Pt's spouse expresses concern about the amount of gabapentin he is prescribed for his restless legs as this was recently increased.  Certainly could contribute to some dizziness but I do not feel his leg symptoms caused by the medication.   1600 On [employer] Quick Look, no observations based on current interrogation.   1635 Delta 2hr hsTnI: 0  Not c/w ACS   1636 Pt reassessed.  Continues to feel asymptomatic.  We did discuss that further evaluation with imaging such as MRI brain may be warranted to further evaluate his symptoms.  He is still wanting to go home and declining of admission.  Will trial ambulation.   1702 Pt reassessed.  Continues to remain asymptomatic.  Pt ambulated, steady gait.       Medications   iohexol (OMNIPAQUE) 350 MG/ML injection (MULTI-DOSE) 85 mL (85 mL Intravenous Given 6/18/25 1441)       ED Risk Strat Scores   HEART Risk Score      Flowsheet Row Most Recent Value   Heart Score Risk Calculator    History 0 Filed at: 06/18/2025 1400   ECG 1 Filed at: 06/18/2025 1400   Age 2 Filed at: 06/18/2025 1400   Risk Factors 2 Filed at: 06/18/2025 1400   Troponin 0 Filed at: 06/18/2025 1400   HEART Score 5 Filed at: 06/18/2025 1400          HEART Risk Score      Flowsheet Row Most Recent Value   Heart Score Risk Calculator    History 0  Filed at: 06/18/2025 1400   ECG 1 Filed at: 06/18/2025 1400   Age 2 Filed at: 06/18/2025 1400   Risk Factors 2 Filed at: 06/18/2025 1400   Troponin 0 Filed at: 06/18/2025 1400   HEART Score 5 Filed at: 06/18/2025 1400           Stroke Assessment       Row Name 06/18/25 1343             NIH Stroke Scale    Interval Baseline      Level of Consciousness (1a.) 0      LOC Questions (1b.) 0      LOC Commands (1c.) 0      Best Gaze (2.) 0      Visual (3.) 0      Facial Palsy (4.) 0      Motor Arm, Left (5a.) 0      Motor Arm, Right (5b.) 0      Motor Leg, Left (6a.) 0      Motor Leg, Right (6b.) 0      Limb Ataxia (7.) 0      Sensory (8.) 0      Best Language (9.) 0      Dysarthria (10.) 0      Extinction and Inattention (11.) (Formerly Neglect) 0      Total 0                    Flowsheet Row Most Recent Value   Thrombolytic Decision Options    Thrombolytic Decision Patient not a candidate.   Patient is not a candidate options Symptoms resolved/clearly non disabling.                    No data recorded        SBIRT 22yo+      Flowsheet Row Most Recent Value   Initial Alcohol Screen: US AUDIT-C     1. How often do you have a drink containing alcohol? 0 Filed at: 06/18/2025 1327   2. How many drinks containing alcohol do you have on a typical day you are drinking?  0 Filed at: 06/18/2025 1327   3b. FEMALE Any Age, or MALE 65+: How often do you have 4 or more drinks on one occassion? 0 Filed at: 06/18/2025 1327   Audit-C Score 0 Filed at: 06/18/2025 1327   DEUCE: How many times in the past year have you...    Used an illegal drug or used a prescription medication for non-medical reasons? Never Filed at: 06/18/2025 1327                            History of Present Illness       Chief Complaint   Patient presents with    Medical Problem     Patient arrives via EMS; was [place] today when he began with R leg numbness/weakness around 1130 which progressed into his L leg. Has had ongoing lightheadedness/dizziness for about  a month which he states worsened. Seen at urgent care and referred to ED       Past Medical History[1]   Past Surgical History[2]   Family History[3]   Social History[4]   E-Cigarette/Vaping    E-Cigarette Use Never User       E-Cigarette/Vaping Substances    Nicotine No     THC No     CBD No     Flavoring No       I have reviewed and agree with the history as documented.     84 year old male with PMH paroxysmal afib, SSS, s/p pacemaker, HLD, GERD, h/o DVT, cardiomyopathy, h/o bladder cancer presenting via EMS from urgent care for further evaluation.  Pt reports he was at [employer] today walking around.  He states he felt like his right foot was asleep and then feels it traveled throughout his leg.  He then states he felt similar symptoms on the left leg and felt like both his legs were weak.  He states he was having trouble walking.  He reports at the time feeling dizzy and lightheaded as well.  He tells me he's had dizziness and lightheadedness for quite some time.  No syncopal episodes.  He reports going to therapy in the past for what he describes as vertigo.  Denies headache.  Denies visual disturbance.  Denies any symptoms in the face or upper extremities.  Denies chest pain, SOB, N/V/D, abdominal pain.  Denies back pain.  Denies fever, chills, cough, congestion or recent illness.  He notes improvement of symptoms since onset.  He states not currently experiencing any numbness/tingling.   No noted facial droop.  No difficultly with speech.  He reports he does bike about  miles per week.  He states he's never had the numbness, tingling but notes when he finishes biking he will take a bit for him to stand/rest before walking again as he gets weak in the legs.      History provided by:  Patient and medical records   used: No    Medical Problem  Chronicity:  New  Associated symptoms: no abdominal pain, no chest pain, no congestion, no cough, no diarrhea, no fatigue, no fever, no  headaches, no loss of consciousness, no nausea, no rash, no rhinorrhea, no shortness of breath, no sore throat, no vomiting and no wheezing        Review of Systems   Constitutional: Negative.  Negative for chills, fatigue and fever.   HENT: Negative.  Negative for congestion, rhinorrhea and sore throat.    Eyes: Negative.  Negative for visual disturbance.   Respiratory: Negative.  Negative for cough, shortness of breath and wheezing.    Cardiovascular: Negative.  Negative for chest pain, palpitations and leg swelling.   Gastrointestinal: Negative.  Negative for abdominal pain, constipation, diarrhea, nausea and vomiting.   Genitourinary: Negative.  Negative for dysuria, flank pain, frequency and hematuria.   Musculoskeletal: Negative.  Negative for back pain and neck pain.   Skin: Negative.  Negative for rash.   Neurological:  Positive for dizziness, light-headedness and numbness. Negative for tremors, loss of consciousness, syncope, facial asymmetry, speech difficulty and headaches.   Psychiatric/Behavioral: Negative.  Negative for confusion.    All other systems reviewed and are negative.          Objective       ED Triage Vitals [06/18/25 1325]   Temperature Pulse Blood Pressure Respirations SpO2 Patient Position - Orthostatic VS   97.7 °F (36.5 °C) 78 160/89 20 98 % --      Temp Source Heart Rate Source BP Location FiO2 (%) Pain Score    Temporal Monitor -- -- No Pain      Vitals      Date and Time Temp Pulse SpO2 Resp BP Pain Score FACES Pain Rating User   06/18/25 1715 -- 60 95 % 14 162/77 -- --    06/18/25 1700 -- 69 94 % 20 160/86 -- --    06/18/25 1645 -- 60 93 % 13 160/84 -- --    06/18/25 1630 -- 60 94 % 18 143/77 -- --    06/18/25 1615 -- 60 97 % 20 146/77 -- --    06/18/25 1600 -- 60 99 % 20 163/71 -- --    06/18/25 1345 -- 60 97 % 20 127/76 -- --    06/18/25 1330 -- 68 98 % 13 139/74 -- --    06/18/25 1325 97.7 °F (36.5 °C) 78 98 % 20 160/89 No Pain --             Physical  Exam  Vitals and nursing note reviewed.   Constitutional:       General: He is awake. He is not in acute distress.     Appearance: Normal appearance. He is well-developed. He is not toxic-appearing or diaphoretic.   HENT:      Head: Normocephalic and atraumatic.      Right Ear: Tympanic membrane, ear canal and external ear normal.      Left Ear: Tympanic membrane, ear canal and external ear normal.      Ears:      Comments: +b/l hearing aids     Nose: Nose normal.      Mouth/Throat:      Lips: Pink.      Mouth: Mucous membranes are moist.      Tongue: Tongue does not deviate from midline.      Pharynx: Oropharynx is clear. Uvula midline.     Eyes:      General: Lids are normal. Gaze aligned appropriately. No visual field deficit or scleral icterus.     Extraocular Movements: Extraocular movements intact.      Conjunctiva/sclera: Conjunctivae normal.      Pupils: Pupils are equal, round, and reactive to light.      Comments: +glasses   Neck:      Trachea: Trachea and phonation normal.     Cardiovascular:      Rate and Rhythm: Normal rate and regular rhythm.      Pulses: Normal pulses.           Radial pulses are 2+ on the right side and 2+ on the left side.        Dorsalis pedis pulses are 2+ on the right side and 2+ on the left side.        Posterior tibial pulses are 2+ on the right side and 2+ on the left side.      Heart sounds: Normal heart sounds, S1 normal and S2 normal.   Pulmonary:      Effort: Pulmonary effort is normal. No tachypnea or respiratory distress.      Breath sounds: Normal breath sounds. No wheezing, rhonchi or rales.   Abdominal:      General: Bowel sounds are normal. There is no distension.      Palpations: Abdomen is soft.      Tenderness: There is no abdominal tenderness. There is no guarding or rebound.     Musculoskeletal:         General: No tenderness. Normal range of motion.      Cervical back: Normal range of motion and neck supple.      Right lower leg: No edema.      Left lower leg:  No edema.     Skin:     General: Skin is warm and dry.      Capillary Refill: Capillary refill takes less than 2 seconds.      Findings: No rash.     Neurological:      General: No focal deficit present.      Mental Status: He is alert and oriented to person, place, and time.      GCS: GCS eye subscore is 4. GCS verbal subscore is 5. GCS motor subscore is 6.      Cranial Nerves: Cranial nerves 2-12 are intact. No cranial nerve deficit, dysarthria or facial asymmetry.      Sensory: Sensation is intact. No sensory deficit.      Motor: Motor function is intact. No weakness or pronator drift.      Coordination: Coordination is intact.     Psychiatric:         Mood and Affect: Mood normal.         Speech: Speech normal. Speech is not slurred.         Behavior: Behavior normal. Behavior is cooperative.         Results Reviewed       Procedure Component Value Units Date/Time    HS Troponin I 2hr [[id number]]  (Normal) Collected: 06/18/25 1600    Lab Status: Final result Specimen: Blood from Arm, Left Updated: 06/18/25 1633     hs TnI 2hr 9 ng/L      Delta 2hr hsTnI 0 ng/L     HS Troponin I 4hr [[id number]]     Lab Status: No result Specimen: Blood     Urine Microscopic [[id number]]  (Abnormal) Collected: 06/18/25 1417    Lab Status: Final result Specimen: Urine, Clean Catch Updated: 06/18/25 1447     RBC, UA 2-4 /hpf      WBC, UA 2-4 /hpf      Epithelial Cells Occasional /hpf      Bacteria, UA Occasional /hpf      MUCUS THREADS Occasional     Hyaline Casts, UA 0-1 /lpf     UA w Reflex to Microscopic w Reflex to Culture [[id number]]  (Abnormal) Collected: 06/18/25 1417    Lab Status: Final result Specimen: Urine, Clean Catch Updated: 06/18/25 1431     Color, UA Yellow     Clarity, UA Clear     Specific Gravity, UA 1.025     pH, UA 5.5     Leukocytes, UA Trace     Nitrite, UA Negative     Protein, UA Trace mg/dl      Glucose, UA Negative mg/dl      Ketones, UA Negative mg/dl      Urobilinogen, UA <2.0 mg/dl      Bilirubin, UA  Negative     Occult Blood, UA Negative    TSH, 3rd generation with Free T4 reflex [[id number]]  (Normal) Collected: 06/18/25 1346    Lab Status: Final result Specimen: Blood from Arm, Left Updated: 06/18/25 1431     TSH 3RD GENERATION 3.588 uIU/mL     HS Troponin 0hr (reflex protocol) [[id number]]  (Normal) Collected: 06/18/25 1346    Lab Status: Final result Specimen: Blood from Arm, Left Updated: 06/18/25 1421     hs TnI 0hr 9 ng/L     Comprehensive metabolic panel [[id number]] Collected: 06/18/25 1346    Lab Status: Final result Specimen: Blood from Arm, Left Updated: 06/18/25 1416     Sodium 139 mmol/L      Potassium 4.5 mmol/L      Chloride 104 mmol/L      CO2 28 mmol/L      ANION GAP 7 mmol/L      BUN 23 mg/dL      Creatinine 1.11 mg/dL      Glucose 103 mg/dL      Calcium 9.5 mg/dL      AST 23 U/L      ALT 20 U/L      Alkaline Phosphatase 51 U/L      Total Protein 6.8 g/dL      Albumin 4.2 g/dL      Total Bilirubin 0.62 mg/dL      eGFR 59 ml/min/1.73sq m     Narrative:      [employer] guidelines for Chronic Kidney Disease (CKD):     Stage 1 with normal or high GFR (GFR > 90 mL/min/1.73 square meters)    Stage 2 Mild CKD (GFR = 60-89 mL/min/1.73 square meters)    Stage 3A Moderate CKD (GFR = 45-59 mL/min/1.73 square meters)    Stage 3B Moderate CKD (GFR = 30-44 mL/min/1.73 square meters)    Stage 4 Severe CKD (GFR = 15-29 mL/min/1.73 square meters)    Stage 5 End Stage CKD (GFR <15 mL/min/1.73 square meters)  Note: GFR calculation is accurate only with a steady state creatinine    Magnesium [[id number]]  (Normal) Collected: 06/18/25 1346    Lab Status: Final result Specimen: Blood from Arm, Left Updated: 06/18/25 1416     Magnesium 2.0 mg/dL     CK [[id number]]  (Normal) Collected: 06/18/25 1346    Lab Status: Final result Specimen: Blood from Arm, Left Updated: 06/18/25 1416     Total CK 75 U/L     Protime-INR [[id number]]  (Normal) Collected: 06/18/25 1346    Lab Status: Final result  Specimen: Blood from Arm, Left Updated: 06/18/25 1404     Protime 14.6 seconds      INR 1.09    Narrative:      INR Therapeutic Range    Indication                                             INR Range      Atrial Fibrillation                                               2.0-3.0  Hypercoagulable State                                    2.0.2.3  Left Ventricular Asist Device                            2.0-3.0  Mechanical Heart Valve                                  -    Aortic(with afib, MI, embolism, HF, LA enlargement,    and/or coagulopathy)                                     2.0-3.0 (2.5-3.5)     Mitral                                                             2.5-3.5  Prosthetic/Bioprosthetic Heart Valve               2.0-3.0  Venous thromboembolism (VTE: VT, PE        2.0-3.0    APTT [[id number]]  (Normal) Collected: 06/18/25 1346    Lab Status: Final result Specimen: Blood from Arm, Left Updated: 06/18/25 1404     PTT 31 seconds     CBC and differential [[id number]] Collected: 06/18/25 1346    Lab Status: Final result Specimen: Blood from Arm, Left Updated: 06/18/25 1359     WBC 6.34 Thousand/uL      RBC 4.36 Million/uL      Hemoglobin 13.4 g/dL      Hematocrit 40.1 %      MCV 92 fL      MCH 30.7 pg      MCHC 33.4 g/dL      RDW 13.4 %      MPV 9.9 fL      Platelets 211 Thousands/uL      nRBC 0 /100 WBCs      Segmented % 49 %      Immature Grans % 0 %      Lymphocytes % 37 %      Monocytes % 10 %      Eosinophils Relative 3 %      Basophils Relative 1 %      Absolute Neutrophils 3.06 Thousands/µL      Absolute Immature Grans 0.02 Thousand/uL      Absolute Lymphocytes 2.36 Thousands/µL      Absolute Monocytes 0.62 Thousand/µL      Eosinophils Absolute 0.19 Thousand/µL      Basophils Absolute 0.09 Thousands/µL             CTA head and neck with and without contrast   Final Interpretation by James Enrique MD (06/18 6516)   CT brain: No acute intracranial abnormality. Mild chronic microangiopathic ischemic  changes.      CTA head: Negative for large vessel intracranial occlusion. Moderate stenosis along the bilateral intracranial vertebral arteries.      CTA neck: Moderate right extracranial ICA stenosis. There is no hemodynamically significant left extracranial carotid stenosis. The cervical vertebral arteries are patent.                     Workstation performed: [id number]         XR chest 1 view portable   Final Interpretation by Christian Prescott MD (06/18 1676)      No acute cardiopulmonary disease.            Workstation performed: [id number]             ECG 12 Lead Documentation Only    Date/Time: 6/18/2025 1:46 PM    Performed by: Anusha Miner PA-C  Authorized by: Anusha Miner PA-C    Indications / Diagnosis:  Dizziness  ECG reviewed by me, the ED Provider: yes    Patient location:  ED  Previous ECG:     Previous ECG:  Compared to current    Similarity:  Changes noted    Comparison to cardiac monitor: Yes    Interpretation:     Interpretation: abnormal    Rate:     ECG rate:  73    ECG rate assessment: normal    Rhythm:     Rhythm: sinus rhythm and A-V block    Conduction:     Conduction: abnormal      Abnormal conduction: bifascicular block    ST segments:     ST segments:  Normal  T waves:     T waves: inverted      Inverted:  II, III, aVF, V3, V4, V5 and V6  Comments:      , QT//440      ED Medication and Procedure Management   Prior to Admission Medications   Prescriptions Last Dose Informant Patient Reported? Taking?   Cetirizine HCl (ZyrTEC Allergy) 10 MG CAPS   Yes No   Sig: Take 10 mg by mouth in the morning   Cholecalciferol 50 MCG (2000 UT) CAPS  Self Yes No   Sig: Take by mouth   Coenzyme Q10 (CO Q 10 PO)  Self Yes No   Sig: Take by mouth   Cyanocobalamin (VITAMIN B 12 PO)  Self Yes No   Sig: Take by mouth   Eliquis 5 MG   No No   Sig: Take 1 tablet (5 mg total) by mouth 2 (two) times a day   Glucosamine-Chondroit-Vit C-Mn (Glucosamine Chondr 1500 Complx) CAPS    Yes No   Sig: Take 1,500 capsules by mouth in the morning   Multiple Vitamins-Minerals (PreserVision AREDS 2) CAPS   Yes No   Sig: Take 1 capsule by mouth in the morning   folic acid (FOLVITE) 1 mg tablet   Yes No   Sig: Take 1 tablet by mouth in the morning   Patient not taking: Reported on 6/18/2025   gabapentin (NEURONTIN) 100 mg capsule   Yes No   Sig: Take 100 mg by mouth in the morning and 100 mg in the evening and 100 mg before bedtime.   levothyroxine 25 mcg tablet  Self Yes No   metoprolol succinate (TOPROL-XL) 50 mg 24 hr tablet   No No   Sig: Take 1 tablet (50 mg total) by mouth 2 (two) times a day   ondansetron (ZOFRAN) 4 mg tablet   Yes No   Sig: Take 4 mg by mouth every 8 (eight) hours as needed   pantoprazole (PROTONIX) 40 mg tablet   Yes No   Sig: Take 40 mg by mouth in the morning.   rOPINIRole (REQUIP) 2 mg tablet   Yes No   Sig: Take 2 mg by mouth in the morning and 2 mg before bedtime.   simvastatin (ZOCOR) 40 mg tablet   Yes No   Sig: Take 40 mg by mouth daily at bedtime      Facility-Administered Medications: None     Discharge Medication List as of 6/18/2025  5:48 PM        CONTINUE these medications which have NOT CHANGED    Details   Cetirizine HCl (ZyrTEC Allergy) 10 MG CAPS Take 10 mg by mouth in the morning, Historical Med      Cholecalciferol 50 MCG (2000 UT) CAPS Take by mouth, Starting Fri 6/4/2021, Historical Med      Coenzyme Q10 (CO Q 10 PO) Take by mouth, Historical Med      Cyanocobalamin (VITAMIN B 12 PO) Take by mouth, Historical Med      Eliquis 5 MG Take 1 tablet (5 mg total) by mouth 2 (two) times a day, Starting Fri 10/29/2021, Normal      folic acid (FOLVITE) 1 mg tablet Take 1 tablet by mouth in the morning, Starting Mon 12/23/2024, Historical Med      gabapentin (NEURONTIN) 100 mg capsule Take 100 mg by mouth in the morning and 100 mg in the evening and 100 mg before bedtime., Starting Wed 8/28/2024, Historical Med      Glucosamine-Chondroit-Vit C-Mn (Glucosamine Chondr  1500 Complx) CAPS Take 1,500 capsules by mouth in the morning, Historical Med      levothyroxine 25 mcg tablet Starting Wed 8/11/2021, Historical Med      metoprolol succinate (TOPROL-XL) 50 mg 24 hr tablet Take 1 tablet (50 mg total) by mouth 2 (two) times a day, Starting Fri 4/4/2025, Normal      Multiple Vitamins-Minerals (PreserVision AREDS 2) CAPS Take 1 capsule by mouth in the morning, Historical Med      ondansetron (ZOFRAN) 4 mg tablet Take 4 mg by mouth every 8 (eight) hours as needed, Starting Mon 12/23/2024, Historical Med      pantoprazole (PROTONIX) 40 mg tablet Take 40 mg by mouth in the morning., Starting Fri 2/3/2023, Historical Med      rOPINIRole (REQUIP) 2 mg tablet Take 2 mg by mouth in the morning and 2 mg before bedtime., Starting Fri 2/7/2025, Historical Med      simvastatin (ZOCOR) 40 mg tablet Take 40 mg by mouth daily at bedtime, Historical Med           No discharge procedures on file.  ED SEPSIS DOCUMENTATION   Time reflects when diagnosis was documented in both MDM as applicable and the Disposition within this note       Time User Action Codes Description Comment    6/18/2025  4:16 PM Anusha Miner [R42] Dizziness     6/18/2025  4:16 PM Anusha Miner Add [R20.2] Paresthesias     6/18/2025  4:16 PM Anusha Miner Modify [R20.2] Paresthesias bilateral lower extremities    6/18/2025  4:18 PM Anusha Miner Add [I65.21] Internal carotid artery stenosis, right     6/18/2025  4:18 PM Anusha Miner Modify [I65.21] Internal carotid artery stenosis, right Moderate right extracranial ICA stenosis                   Anusha Miner PA-C  06/18/25 1856         [1]   Past Medical History:  Diagnosis Date    Bladder cancer (HCC)     Dilated aortic root (HCC)     Dilated cardiomyopathy (HCC)     DVT (deep venous thrombosis) (HCC)     GERD (gastroesophageal reflux disease)     Hyperlipidemia     Mitral regurgitation     Pacemaker     Pleural effusion     Rib fractures     SSS (sick  sinus syndrome) (HCC)    [2]   Past Surgical History:  Procedure Laterality Date    BLADDER TUMOR EXCISION      CARDIAC ELECTROPHYSIOLOGY PROCEDURE N/A 10/21/2021    PPM generator change - dual. Surgeon: Edy Hyde MD    CARDIAC PACEMAKER PLACEMENT  06/09/2009 2009, generator replacement 10/2021    HERNIA REPAIR      X2    THORACOSCOPY VIDEO ASSISTED SURGERY (VATS) Left 11/21/2020    THORACOSCOPY VIDEO ASSISTED SURGERY (VATS), washout of hemothorax, decortication. Surgeon: Alex Eduardo MD;  Location: [facility];  Service: Thoracic    TONSILLECTOMY     [3]   Family History  Problem Relation Name Age of Onset    Lung cancer Mother      Colon cancer Father      COPD Sister     [4]   Social History  Tobacco Use    Smoking status: Never     Passive exposure: Never    Smokeless tobacco: Never   Vaping Use    Vaping status: Never Used   Substance Use Topics    Alcohol use: Not Currently    Drug use: Not Currently        Anusha Miner PA-C  06/18/25 1932

## 2025-06-18 NOTE — ED PROVIDER NOTES
Time reflects when diagnosis was documented in both MDM as applicable and the Disposition within this note       Time User Action Codes Description Comment    6/18/2025  7:24 PM German Onofre Add [R42] Dizziness     6/18/2025  7:24 PM German Onofre Add [R53.1] Generalized weakness           ED Disposition       ED Disposition   Admit    Condition   Stable    Date/Time   Wed Jun 18, 2025  7:24 PM    Comment   Discussed case with hospital medicine, patient will be admitted for observation under Dr. Amaral             Assessment & Plan       Medical Decision Making  84-year-old male presents to the emergency department with complaint of dizzy spell, differential diagnosis include TIA, CVA, ACS, electrolyte abnormality, BPPV, vertigo, dehydration, patient had full workup including CT[place] of [place] just prior to [place], will discuss case with hospital medicine for admission for MRI, and observation with concern for TIA.    Amount and/or Complexity of Data Reviewed  Labs: ordered.  Radiology: ordered.    Risk  Decision regarding hospitalization.             Medications - No data to display    ED Risk Strat Scores         Stroke Assessment       Row Name 06/18/25 1920             NIH Stroke Scale    Interval Baseline      Level of Consciousness (1a.) 0      LOC Questions (1b.) 0      LOC Commands (1c.) 0      Best Gaze (2.) 0      Visual (3.) 0      Facial Palsy (4.) 0      Motor Arm, Left (5a.) 0      Motor Arm, Right (5b.) 0      Motor Leg, Left (6a.) 0      Motor Leg, Right (6b.) 0      Limb Ataxia (7.) 0      Sensory (8.) 0      Best Language (9.) 0      Dysarthria (10.) 0      Extinction and Inattention (11.) (Formerly Neglect) 0      Total 0                              No data recorded        SBIRT 20yo+      Flowsheet Row Most Recent Value   Initial Alcohol Screen: US AUDIT-C     1. How often do you have a drink containing alcohol? 0 Filed at: 06/18/2025 3420   2. How many drinks containing  alcohol do you have on a typical day you are drinking?  0 Filed at: 06/18/2025 1844   3b. FEMALE Any Age, or MALE 65+: How often do you have 4 or more drinks on one occassion? 0 Filed at: 06/18/2025 1844   Audit-C Score 0 Filed at: 06/18/2025 1844   DEUCE: How many times in the past year have you...    Used an illegal drug or used a prescription medication for non-medical reasons? Never Filed at: 06/18/2025 1844                            History of Present Illness       Chief Complaint   Patient presents with    Weakness - Generalized     Patient was just discharged today from [facility] and was walking around [place] and on his way home got weak in the legs and unable to walk. Reports I just had all these tests done.        Past Medical History[1]   Past Surgical History[2]   Family History[3]   Social History[4]   E-Cigarette/Vaping    E-Cigarette Use Never User       E-Cigarette/Vaping Substances    Nicotine No     THC No     CBD No     Flavoring No       I have reviewed and agree with the history as documented.      84-year-old male with past medical history of paroxysmal A-fib, SSS, status post pacemaker, hyperlipidemia, GERD, DVT, cardiomyopathy, bladder cancer, presenting to the emergency department for dizzy spell.  Patient was at the [employer] today, walking around, states that he felt like his right foot was asleep while he was walking, and then it traveled upward throughout his leg.  Patient was then weaving towards 1 side.  He was having difficulty walking.  Patient did not have any lightheadedness, did not feel like he was going to pass out.  Patient does report history of vertigo in the past.  He denies any headache, vision changes, he denies any focal weakness, or changes in sensation at this time.  Patient denies any chest pain, shortness of breath, GI or  complaints.  Patient went to [facility] emergency department, had full workup including CTA of head and neck which was  unremarkable, patient was offered admission for observation, MRI with concern for TIA, however patient refused, and left to go home with his wife.  Patient had repeat episode while getting into his car, however he wanted to come to an emergency department closer to him.  At this time, patient is currently asymptomatic, however states he is feeling abnormal sensation in head.  I was going to put him in for lab work, I wanted to admit him under TIA pathway for observation, and MRI.          Review of Systems        Objective       ED Triage Vitals [06/18/25 1842]   Temperature Pulse Blood Pressure Respirations SpO2 Patient Position - Orthostatic VS   98 °F (36.7 °C) 77 (!) 157/108 18 97 % Lying      Temp Source Heart Rate Source BP Location FiO2 (%) Pain Score    Temporal Monitor Left arm -- No Pain      Vitals      Date and Time Temp Pulse SpO2 Resp BP Pain Score FACES Pain Rating User   06/18/25 1842 98 °F (36.7 °C) 77 97 % 18 157/108 No Pain -- BF            Physical Exam  Vitals and nursing note reviewed.   Constitutional:       General: He is not in acute distress.     Appearance: He is well-developed.   HENT:      Head: Normocephalic and atraumatic.     Eyes:      Conjunctiva/sclera: Conjunctivae normal.       Cardiovascular:      Rate and Rhythm: Normal rate and regular rhythm.   Pulmonary:      Effort: Pulmonary effort is normal. No respiratory distress.      Breath sounds: Normal breath sounds.   Abdominal:      Palpations: Abdomen is soft.      Tenderness: There is no abdominal tenderness.     Musculoskeletal:         General: No swelling.      Cervical back: Neck supple.     Skin:     General: Skin is warm and dry.      Capillary Refill: Capillary refill takes less than 2 seconds.     Neurological:      General: No focal deficit present.      Mental Status: He is alert and oriented to person, place, and time.      Cranial Nerves: No cranial nerve deficit.      Sensory: No sensory deficit.      Motor: No  weakness.      Coordination: Coordination normal.     Psychiatric:         Mood and Affect: Mood normal.         Results Reviewed       Procedure Component Value Units Date/Time    CBC and differential [[id number]] Collected: 06/18/25 1922    Lab Status: No result Specimen: Blood from Arm, Left     Comprehensive metabolic panel [[id number]] Collected: 06/18/25 1922    Lab Status: No result Specimen: Blood from Arm, Left     APTT [[id number]] Collected: 06/18/25 1922    Lab Status: No result Specimen: Blood from Arm, Left     Protime-INR [[id number]] Collected: 06/18/25 1922    Lab Status: No result Specimen: Blood from Arm, Left     B-Type Natriuretic Peptide(BNP) [[id number]] Collected: 06/18/25 1922    Lab Status: No result Specimen: Blood from Arm, Left     HS Troponin 0hr (reflex protocol) [[id number]] Collected: 06/18/25 1922    Lab Status: No result Specimen: Blood from Arm, Left             X-ray chest 1 view portable    (Results Pending)       Procedures    ED Medication and Procedure Management   Prior to Admission Medications   Prescriptions Last Dose Informant Patient Reported? Taking?   Cetirizine HCl (ZyrTEC Allergy) 10 MG CAPS   Yes No   Sig: Take 10 mg by mouth in the morning   Cholecalciferol 50 MCG (2000 UT) CAPS  Self Yes No   Sig: Take by mouth   Coenzyme Q10 (CO Q 10 PO)  Self Yes No   Sig: Take by mouth   Cyanocobalamin (VITAMIN B 12 PO)  Self Yes No   Sig: Take by mouth   Eliquis 5 MG   No No   Sig: Take 1 tablet (5 mg total) by mouth 2 (two) times a day   Glucosamine-Chondroit-Vit C-Mn (Glucosamine Chondr 1500 Complx) CAPS   Yes No   Sig: Take 1,500 capsules by mouth in the morning   Multiple Vitamins-Minerals (PreserVision AREDS 2) CAPS   Yes No   Sig: Take 1 capsule by mouth in the morning   folic acid (FOLVITE) 1 mg tablet   Yes No   Sig: Take 1 tablet by mouth in the morning   Patient not taking: Reported on 6/18/2025   gabapentin (NEURONTIN) 100 mg capsule   Yes No   Sig: Take 100 mg by mouth  in the morning and 100 mg in the evening and 100 mg before bedtime.   levothyroxine 25 mcg tablet  Self Yes No   metoprolol succinate (TOPROL-XL) 50 mg 24 hr tablet   No No   Sig: Take 1 tablet (50 mg total) by mouth 2 (two) times a day   ondansetron (ZOFRAN) 4 mg tablet   Yes No   Sig: Take 4 mg by mouth every 8 (eight) hours as needed   pantoprazole (PROTONIX) 40 mg tablet   Yes No   Sig: Take 40 mg by mouth in the morning.   rOPINIRole (REQUIP) 2 mg tablet   Yes No   Sig: Take 2 mg by mouth in the morning and 2 mg before bedtime.   simvastatin (ZOCOR) 40 mg tablet   Yes No   Sig: Take 40 mg by mouth daily at bedtime      Facility-Administered Medications: None     Patient's Medications   Discharge Prescriptions    No medications on file     No discharge procedures on file.  ED SEPSIS DOCUMENTATION   Time reflects when diagnosis was documented in both MDM as applicable and the Disposition within this note       Time User Action Codes Description Comment    6/18/2025  7:24 PM German Onofre [R42] Dizziness     6/18/2025  7:24 PM German Onofre [R53.1] Generalized weakness                      [1]   Past Medical History:  Diagnosis Date    Bladder cancer (HCC)     Dilated aortic root (HCC)     Dilated cardiomyopathy (HCC)     DVT (deep venous thrombosis) (HCC)     GERD (gastroesophageal reflux disease)     Hyperlipidemia     Mitral regurgitation     Pacemaker     Pleural effusion     Rib fractures     SSS (sick sinus syndrome) (HCC)    [2]   Past Surgical History:  Procedure Laterality Date    BLADDER TUMOR EXCISION      CARDIAC ELECTROPHYSIOLOGY PROCEDURE N/A 10/21/2021    PPM generator change - dual. Surgeon: Edy Hyde MD    CARDIAC PACEMAKER PLACEMENT  06/09/2009 2009, generator replacement 10/2021    HERNIA REPAIR      X2    THORACOSCOPY VIDEO ASSISTED SURGERY (VATS) Left 11/21/2020    THORACOSCOPY VIDEO ASSISTED SURGERY (VATS), washout of hemothorax, decortication. Surgeon: Alex Eduardo  MD;  Location: [facility];  Service: Thoracic    TONSILLECTOMY     [3]   Family History  Problem Relation Name Age of Onset    Lung cancer Mother      Colon cancer Father      COPD Sister     [4]   Social History  Tobacco Use    Smoking status: Never     Passive exposure: Never    Smokeless tobacco: Never   Vaping Use    Vaping status: Never Used   Substance Use Topics    Alcohol use: Not Currently    Drug use: Not Currently        German Onofre DO  06/18/25 1929

## 2025-06-19 ENCOUNTER — APPOINTMENT (OUTPATIENT)
Dept: MRI IMAGING | Facility: HOSPITAL | Age: 85
DRG: 312 | End: 2025-06-19
Payer: MEDICARE

## 2025-06-19 PROBLEM — I95.1 ORTHOSTATIC HYPOTENSION: Status: ACTIVE | Noted: 2025-06-19

## 2025-06-19 LAB
25(OH)D3 SERPL-MCNC: 49.9 NG/ML (ref 30–100)
4HR DELTA HS TROPONIN: 2 NG/L
ANION GAP SERPL CALCULATED.3IONS-SCNC: 7 MMOL/L (ref 4–13)
ATRIAL RATE: 65 BPM
ATRIAL RATE: 73 BPM
BUN SERPL-MCNC: 23 MG/DL (ref 5–25)
CALCIUM SERPL-MCNC: 8.9 MG/DL (ref 8.4–10.2)
CARDIAC TROPONIN I PNL SERPL HS: 12 NG/L (ref ?–50)
CHLORIDE SERPL-SCNC: 106 MMOL/L (ref 96–108)
CHOLEST SERPL-MCNC: 162 MG/DL (ref ?–200)
CO2 SERPL-SCNC: 25 MMOL/L (ref 21–32)
CREAT SERPL-MCNC: 0.96 MG/DL (ref 0.6–1.3)
ERYTHROCYTE [DISTWIDTH] IN BLOOD BY AUTOMATED COUNT: 13.3 % (ref 11.6–15.1)
EST. AVERAGE GLUCOSE BLD GHB EST-MCNC: 128 MG/DL
GFR SERPL CREATININE-BSD FRML MDRD: 71 ML/MIN/1.73SQ M
GLUCOSE P FAST SERPL-MCNC: 95 MG/DL (ref 65–99)
GLUCOSE SERPL-MCNC: 95 MG/DL (ref 65–140)
HBA1C MFR BLD: 6.1 %
HCT VFR BLD AUTO: 38.4 % (ref 36.5–49.3)
HDLC SERPL-MCNC: 43 MG/DL
HGB BLD-MCNC: 12.9 G/DL (ref 12–17)
LDLC SERPL CALC-MCNC: 94 MG/DL (ref 0–100)
MAGNESIUM SERPL-MCNC: 1.8 MG/DL (ref 1.9–2.7)
MCH RBC QN AUTO: 30.3 PG (ref 26.8–34.3)
MCHC RBC AUTO-ENTMCNC: 33.6 G/DL (ref 31.4–37.4)
MCV RBC AUTO: 90 FL (ref 82–98)
P AXIS: 47 DEGREES
PHOSPHATE SERPL-MCNC: 3.3 MG/DL (ref 2.3–4.1)
PLATELET # BLD AUTO: 168 THOUSANDS/UL (ref 149–390)
PMV BLD AUTO: 10 FL (ref 8.9–12.7)
POTASSIUM SERPL-SCNC: 4.2 MMOL/L (ref 3.5–5.3)
PR INTERVAL: 336 MS
PR INTERVAL: 350 MS
QRS AXIS: 110 DEGREES
QRS AXIS: 111 DEGREES
QRSD INTERVAL: 144 MS
QRSD INTERVAL: 144 MS
QT INTERVAL: 400 MS
QT INTERVAL: 424 MS
QTC INTERVAL: 440 MS
QTC INTERVAL: 440 MS
RBC # BLD AUTO: 4.26 MILLION/UL (ref 3.88–5.62)
SODIUM SERPL-SCNC: 138 MMOL/L (ref 135–147)
T WAVE AXIS: -58 DEGREES
T WAVE AXIS: -61 DEGREES
TRIGL SERPL-MCNC: 126 MG/DL (ref ?–150)
VENTRICULAR RATE: 65 BPM
VENTRICULAR RATE: 73 BPM
VIT B12 SERPL-MCNC: 420 PG/ML (ref 180–914)
WBC # BLD AUTO: 7.2 THOUSAND/UL (ref 4.31–10.16)

## 2025-06-19 PROCEDURE — 97530 THERAPEUTIC ACTIVITIES: CPT

## 2025-06-19 PROCEDURE — 93010 ELECTROCARDIOGRAM REPORT: CPT | Performed by: INTERNAL MEDICINE

## 2025-06-19 PROCEDURE — 99205 OFFICE O/P NEW HI 60 MIN: CPT | Performed by: STUDENT IN AN ORGANIZED HEALTH CARE EDUCATION/TRAINING PROGRAM

## 2025-06-19 PROCEDURE — 99232 SBSQ HOSP IP/OBS MODERATE 35: CPT | Performed by: FAMILY MEDICINE

## 2025-06-19 PROCEDURE — 85027 COMPLETE CBC AUTOMATED: CPT | Performed by: NURSE PRACTITIONER

## 2025-06-19 PROCEDURE — 83036 HEMOGLOBIN GLYCOSYLATED A1C: CPT | Performed by: NURSE PRACTITIONER

## 2025-06-19 PROCEDURE — 84484 ASSAY OF TROPONIN QUANT: CPT | Performed by: EMERGENCY MEDICINE

## 2025-06-19 PROCEDURE — 76014 MR SFTY IMPLT&/FB ASMT STF 1: CPT

## 2025-06-19 PROCEDURE — 80061 LIPID PANEL: CPT | Performed by: NURSE PRACTITIONER

## 2025-06-19 PROCEDURE — 97167 OT EVAL HIGH COMPLEX 60 MIN: CPT

## 2025-06-19 PROCEDURE — 80048 BASIC METABOLIC PNL TOTAL CA: CPT | Performed by: NURSE PRACTITIONER

## 2025-06-19 PROCEDURE — 84100 ASSAY OF PHOSPHORUS: CPT | Performed by: NURSE PRACTITIONER

## 2025-06-19 PROCEDURE — 83735 ASSAY OF MAGNESIUM: CPT | Performed by: NURSE PRACTITIONER

## 2025-06-19 PROCEDURE — 70551 MRI BRAIN STEM W/O DYE: CPT

## 2025-06-19 RX ADMIN — GABAPENTIN 100 MG: 100 CAPSULE ORAL at 07:34

## 2025-06-19 RX ADMIN — GABAPENTIN 300 MG: 300 CAPSULE ORAL at 17:13

## 2025-06-19 RX ADMIN — APIXABAN 5 MG: 5 TABLET, FILM COATED ORAL at 07:34

## 2025-06-19 RX ADMIN — METOPROLOL SUCCINATE 50 MG: 50 TABLET, EXTENDED RELEASE ORAL at 07:34

## 2025-06-19 RX ADMIN — ASPIRIN 81 MG: 81 TABLET, CHEWABLE ORAL at 07:34

## 2025-06-19 RX ADMIN — LEVOTHYROXINE SODIUM 125 MCG: 0.12 TABLET ORAL at 06:17

## 2025-06-19 RX ADMIN — ROPINIROLE 2 MG: 1 TABLET, FILM COATED ORAL at 21:20

## 2025-06-19 RX ADMIN — ROPINIROLE 2 MG: 1 TABLET, FILM COATED ORAL at 07:34

## 2025-06-19 RX ADMIN — GABAPENTIN 300 MG: 300 CAPSULE ORAL at 21:20

## 2025-06-19 RX ADMIN — CYANOCOBALAMIN TAB 500 MCG 1000 MCG: 500 TAB at 13:03

## 2025-06-19 RX ADMIN — APIXABAN 5 MG: 5 TABLET, FILM COATED ORAL at 17:13

## 2025-06-19 RX ADMIN — PANTOPRAZOLE SODIUM 40 MG: 40 TABLET, DELAYED RELEASE ORAL at 07:34

## 2025-06-19 RX ADMIN — ATORVASTATIN CALCIUM 40 MG: 40 TABLET, FILM COATED ORAL at 17:13

## 2025-06-19 NOTE — SPEECH THERAPY NOTE
Speech Language/Pathology  Consult received.  Records reviewed.  Pt admitted c symptoms concerning for CVA/TIA.  Pt passed RN Dysphagia Assessment.  Communication deficits denied.   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 No acute intracranial pathology. No additional inpatient Speech Pathology evaluation appears indicated at this time.  Please re-consult if additional concerns arise. Thank you.

## 2025-06-19 NOTE — CASE MANAGEMENT
Case Management Assessment & Discharge Planning Note    Patient name Madhu Bach  Location /-01 MRN [id number]  : 1940 Date 2025       Current Admission Date: 2025  Current Admission Diagnosis:Stroke-like symptom   Patient Active Problem List    Diagnosis Date Noted    Orthostatic hypotension 2025    Stroke-like symptom 2025    Paroxysmal atrial fibrillation (HCC) 2021    History of venous thromboembolism 2021    Other specified hypothyroidism 2021    Hyperlipidemia     Dilated aortic root (HCC)     Dilated cardiomyopathy (HCC)     History of tachycardia-bradycardia syndrome 2020    MR (mitral regurgitation) 2020    Normocytic anemia 2020    Closed fracture of multiple ribs of left side with routine healing 2020    Bladder tumor 2020    Non-seasonal allergic rhinitis 2020    BPH (benign prostatic hyperplasia) 2019    Gastroesophageal reflux disease 10/28/2019    Presence of cardiac pacemaker 2019      LOS (days): 0  Geometric Mean LOS (GMLOS) (days):   Days to GMLOS:     OBJECTIVE:              Current admission status: Inpatient  Referral Reason: Stroke    Preferred Pharmacy:   [facility]/pharmacy #[id number] - [place], [place] - [address] [place] [id number]  Phone: [phone] Fax: [phone]    [facility] [place] [place] - [facility]  [address] [facility] [address]  Phone: [phone] Fax: [phone]    Primary Care Provider: Alxe Veras MD    Primary Insurance: MEDICARE  Secondary Insurance: BLUE CROSS    ASSESSMENT:  Active Health Care Proxies    There are no active Health Care Proxies on file.       Advance Directives  Does patient have a Health Care POA?: Yes  Does patient have Advance Directives?: Yes  Advance Directives: Living will  Primary Contact: Spouse - Kiya         Readmission Root Cause  30 Day  Readmission: No    Patient Information  Admitted from:: Home  Mental Status: Alert  During Assessment patient was accompanied by: Not accompanied during assessment  Support Systems: Spouse/significant other, Family members, Son  County of Residence: [facility]  What city do you live in?: [place]  Home entry access options. Select all that apply.: Stairs, Garage  Number of steps to enter home.: 1  Do the steps have railings?: No  Type of Current Residence: 2 story home  Upon entering residence, is there a bedroom on the main floor (no further steps)?: Yes  Upon entering residence, is there a bathroom on the main floor (no further steps)?: Yes  Living Arrangements: Lives w/ Spouse/significant other  Is patient a ?: No    Activities of Daily Living Prior to Admission  Functional Status: Independent  Completes ADLs independently?: Yes  Ambulates independently?: Yes  Does patient use assisted devices?: No  Does patient currently own DME?: No  Does patient have a history of Outpatient Therapy (PT/OT)?: Yes ([facility])  Does the patient have a history of Short-Term Rehab?: No  Does patient have a history of HHC?: No  Does patient currently have HHC?: No         Patient Information Continued  Income Source: SSI/SSD  Does patient have prescription coverage?: Yes  Can the patient afford their medications and any related supplies (such as glucometers or test strips)?: Yes  Does patient receive dialysis treatments?: No  Does patient have a history of substance abuse?: No  Does patient have a history of Mental Health Diagnosis?: No         Means of Transportation  Means of Transport to [facility]:: Drives Self          DISCHARGE DETAILS:    Discharge planning discussed with:: patient, spouse  Freedom of Choice: Yes  Comments - Freedom of Choice: aware of OPPT rec at [facility] and has place in mind - declined  list  CM contacted family/caregiver?: Yes  Were Treatment Team discharge recommendations reviewed with  patient/caregiver?: Yes  Did patient/caregiver verbalize understanding of patient care needs?: Yes  Were patient/caregiver advised of the risks associated with not following Treatment Team discharge recommendations?: Yes    Contacts  Patient Contacts: Spouse - Kiya  Relationship to Patient:: Family  Contact Method: Phone  Phone Number: [phone]  Reason/Outcome: Continuity of Care, Discharge Planning    Requested Home Health Care         Is the patient interested in HHC at discharge?: No    DME Referral Provided  Referral made for DME?: No         Would you like to participate in our [facility] service program?  : No - Declined    Treatment Team Recommendation: Home, Outpatient Rehab  Expected Discharge Disposition: Home or Self Care     Transport at Discharge : Family       CM met with patient at the bedside/spouse by phone,baseline information  was obtained. CM discussed the role of CM in helping the patient develop a discharge plan and assist the patient in carry out their plan.  Patient was IPTA, uses no DME at baseline for ambulation, and has no hx of rehab/therapy.   Patient lives with spouse in 2 story home - enter in the garage with a ST. Patient has 1st floor set up.   CM discussed discharge planning - at this time patient has no anticipated CM needs. Patient aware of OP therapy at [facility] - declined [facility]: list and has facility already in mind if feels it is needed.       Case was discussed in multi disciplinary rounds. Attending the rounds were the provider, Nursing, Care Management, and therapy (OT).   Plan is MRI pending.  OR recommendation is OP therapy  CM will continue to follow.

## 2025-06-19 NOTE — CONSULTS
Consultation - Neurology   Name: Madhu Bach 84 y.o. male I MRN: [id number]  Unit/Bed#: -01 I Date of Admission: 6/18/2025   Date of Service: 6/19/2025 I Hospital Day: 0   Inpatient consult to Neurology  Consult performed by: Kenneth Cunningham MD  Consult ordered by: IVY Romero        Physician Requesting Evaluation: Jessenia Frazier MD   Reason for Evaluation / Principal Problem: gait issues     Assessment & Plan  Stroke-like symptom  Madhu Bach is a 84 y.o. left handed male with RLS on ropinirole 2 mg twice daily and gabapentin 3 times daily, polyneuropathy,  dilated cardiomyopathy, PAF on Eliquis, SSS PPM in place, hyperlipidemia tachybradycardia syndrome who presents with gait issues. NIHSS 0 but he is still unsteady upon walking with therapist in am.     - Admit to stroke pathway, MRI brain, Echo, A1c, lipid panel, telemetry. Repeat CTH if unable to tolerate MRI  - Permissive HTN for now, BP no greater than 180/100 mmHg  - Continue  Eliquis and statin   - Vascular risk factor management, A1c goal <6.5, LDL goal <70  - PT/OT/ST    Dilated cardiomyopathy (HCC)  Echo pending   Paroxysmal atrial fibrillation (HCC)  Continue Eliquis       Recommendations for outpatient neurological follow up have yet to be determined.    History of Present Illness   Madhu Bach is a 84 y.o. left handed male with RLS on ropinirole 2 mg twice daily and gabapentin 3 times daily, polyneuropathy,  dilated cardiomyopathy, PAF on Eliquis, SSS PPM in place, hyperlipidemia tachybradycardia syndrome who presents with gait issues. He is very active and yesterday he was unable to walk in a straight line. Episode of bilateral lower ext paraesthesias as well. These episodes lasted up to 2 hours. He felt back to his baseline now.   He take it in the morning and evening at 5-6 am/pm. He is taking vitamin pills at home.       Review of Systems   Constitutional symptoms: no weakness, no fever, no chills, no sweats.   Skin  symptoms: no rash.   Eye:  no vision changes/disturbances, Blurry vision  ENMT:   no dizziness  Respiratory symptoms: no shortness of breath, no cough.   Cardiovascular symptoms: no chest pain.   Gastrointestinal symptoms: no nausea, no vomiting, no diarrhea.   Hema/Lymph:    no problems  Endocrine:    no Cold intolerance, Heat intolerance  Immunologic:    no problems  Musculoskeletal symptoms: no back pain.  Integumentary:    no problems   Neurologic symptoms: no headache, no dizziness, no weakness, no altered level of consciousness, numbness, tingling. Abnormal balance no falls  Psych: No SI/HI  All other system reviewed and negative except positives noted above    Medical History Review: I have reviewed the patient's PMH, PSH, Social History, Family History, Meds, and Allergies   Historical Information   Past Medical History[1]  Past Surgical History[2]  Social History[3]  E-Cigarette/Vaping    E-Cigarette Use Never User      E-Cigarette/Vaping Substances    Nicotine No     THC No     CBD No     Flavoring No      Family History[4]  Social History[5]    Current Facility-Administered Medications:     apixaban (ELIQUIS) tablet 5 mg, BID    aspirin chewable tablet 81 mg, Daily    atorvastatin (LIPITOR) tablet 40 mg, QPM    gabapentin (NEURONTIN) capsule 100 mg, Daily    gabapentin (NEURONTIN) capsule 300 mg, QPM    gabapentin (NEURONTIN) capsule 300 mg, HS    levothyroxine tablet 125 mcg, Early Morning    metoprolol succinate (TOPROL-XL) 24 hr tablet 50 mg, BID    pantoprazole (PROTONIX) EC tablet 40 mg, Early Morning    rOPINIRole (REQUIP) tablet 2 mg, BID  Prior to Admission Medications   Prescriptions Last Dose Informant Patient Reported? Taking?   Cetirizine HCl (ZyrTEC Allergy) 10 MG CAPS 6/17/2025 at  9:00 PM  Yes Yes   Sig: Take 10 mg by mouth in the morning   Cholecalciferol 50 MCG (2000 UT) CAPS 6/18/2025 at  9:00 AM Self Yes Yes   Sig: Take by mouth   Coenzyme Q10 (CO Q 10 PO) 6/18/2025 at  9:00 AM Self Yes  Yes   Sig: Take by mouth   Cyanocobalamin (VITAMIN B 12 PO) 6/18/2025 at  9:00 AM Self Yes Yes   Sig: Take by mouth   Eliquis 5 MG 6/18/2025 at  9:00 AM  No Yes   Sig: Take 1 tablet (5 mg total) by mouth 2 (two) times a day   Glucosamine-Chondroit-Vit C-Mn (Glucosamine Chondr 1500 Complx) CAPS Unknown  Yes No   Sig: Take 1,500 capsules by mouth in the morning   Patient not taking: Reported on 6/18/2025   Multiple Vitamins-Minerals (PreserVision AREDS 2) CAPS 6/18/2025 at  9:00 AM  Yes Yes   Sig: Take 1 capsule by mouth in the morning   folic acid (FOLVITE) 1 mg tablet Not Taking  Yes No   Sig: Take 1 tablet by mouth in the morning   Patient not taking: Reported on 6/18/2025   gabapentin (NEURONTIN) 100 mg capsule 6/18/2025 at  9:00 AM  Yes Yes   Sig: Take 100 mg by mouth in the morning and 100 mg in the evening and 100 mg before bedtime.   levothyroxine 25 mcg tablet 6/18/2025 at  6:00 AM Self Yes Yes   metoprolol succinate (TOPROL-XL) 50 mg 24 hr tablet 6/18/2025 at  9:00 AM  No Yes   Sig: Take 1 tablet (50 mg total) by mouth 2 (two) times a day   ondansetron (ZOFRAN) 4 mg tablet Unknown  Yes No   Sig: Take 4 mg by mouth every 8 (eight) hours as needed   pantoprazole (PROTONIX) 40 mg tablet 6/18/2025 at  7:30 AM  Yes Yes   Sig: Take 40 mg by mouth in the morning.   rOPINIRole (REQUIP) 2 mg tablet 6/18/2025 at  9:00 AM  Yes Yes   Sig: Take 2 mg by mouth in the morning and 2 mg before bedtime.   simvastatin (ZOCOR) 40 mg tablet 6/17/2025 at  9:00 PM  Yes Yes   Sig: Take 40 mg by mouth daily at bedtime      Facility-Administered Medications: None     Penicillins    Objective :  Temp:  [96.8 °F (36 °C)-98.5 °F (36.9 °C)] 98.5 °F (36.9 °C)  HR:  [36-78] 59  BP: ()/() 137/66  Resp:  [13-22] 18  SpO2:  [92 %-99 %] 97 %  O2 Device: None (Room air)    Physical Exam  Modified PE as this is a video consultation:  Gen:   NAD.  HEENT:  No Septal deviation EOMI NCAT.  Resp:  Symmetric chest rise and patient in no  obvious respiratory distress  MSK: ROM normal  Skin: No rash noted in visualized portion of this exam    Neurological Exam  Awake, alert, oriented x 4 name, age, month ,year. No aphasia or dysarthria confusion noted.  Patient is able to follow 2 and 3 step commands with ease  CN 2-12 grossly intact, EOMI,  no facial asymmetry with eye brow raise or smile,  including finger rub symmetric on both sides- instructed pt on how to do this, V1-3 done with help of patient touching her own face in those distributions as instructed normal to LT, hearing intact to conversation,  no tongue deviation, symmetric shoulder shrug and side bending and neck rotation  Motor:  Intact antigravity x 4 extremities.   Sensation: Intact to light touch, instructed patient on how to do this in all extremities proximal and distal.  Cerebellar: No dysmetria b/l with FNF testing.   Gait:  Deferred        Lab Results: I have reviewed the following results:I have personally reviewed pertinent reports.  , CBC:   Results from last 7 days   Lab Units 06/19/25 0511 06/18/25 1922 06/18/25  1346   WBC Thousand/uL 7.20 5.96 6.34   RBC Million/uL 4.26 4.42 4.36   HEMOGLOBIN g/dL 12.9 13.3 13.4   HEMATOCRIT % 38.4 40.2 40.1   MCV fL 90 91 92   PLATELETS Thousands/uL 168 177 211   , BMP/CMP:   Results from last 7 days   Lab Units 06/19/25 0511 06/18/25 1922 06/18/25  1346   SODIUM mmol/L 138 138 139   POTASSIUM mmol/L 4.2 4.4 4.5   CHLORIDE mmol/L 106 105 104   CO2 mmol/L 25 27 28   BUN mg/dL 23 23 23   CREATININE mg/dL 0.96 0.99 1.11   CALCIUM mg/dL 8.9 9.2 9.5   AST U/L  --  21 23   ALT U/L  --  19 20   ALK PHOS U/L  --  47 51   EGFR ml/min/1.73sq m 71 68 59   , HgBA1C:   Results from last 7 days   Lab Units 06/19/25  0511   HEMOGLOBIN A1C % 6.1*   , Lipid Profile:   Results from last 7 days   Lab Units 06/19/25  0511   HDL mg/dL 43   LDL CALC mg/dL 94   TRIGLYCERIDES mg/dL 126     Recent Labs     06/19/25  0511   WBC 7.20   HGB 12.9   HCT 38.4   PLT  168   SODIUM 138   K 4.2      CO2 25   BUN 23   CREATININE 0.96   GLUC 95   MG 1.8*   PHOS 3.3     Imaging Results Review: I personally reviewed the following image studies in PACS and associated radiology reports: CTA and CT head. My interpretation of the radiology images/reports is: as noted above.          VTE Prophylaxis: VTE covered by:  apixaban, Oral, 5 mg at 06/19/25 0734       Administrative Statements   VIRTUAL CARE DOCUMENTATION:     1. This service was provided via Telemedicine using Open English Kit     2. Parties in the room with patient during teleconsult Patient only    3. Confidentiality My office door was closed     4. Participants No one else was in the room    5. Patient acknowledged consent and understanding of privacy and security of the  Telemedicine consult. I informed the patient that I have reviewed their record in Epic and presented the opportunity for them to ask any questions regarding the visit today.  The patient agreed to participate.    6. I have spent a total time of 50 minutes in caring for this patient on the day of the visit/encounter including Diagnostic results, Prognosis, Risks and benefits of tx options, Instructions for management, Patient and family education, Risk factor reductions, Impressions, and Communicating with other healthcare professionals , not including the time spent for establishing the audio/video connection.           [1]   Past Medical History:  Diagnosis Date    Bladder cancer (HCC)     Dilated aortic root (HCC)     Dilated cardiomyopathy (HCC)     DVT (deep venous thrombosis) (HCC)     GERD (gastroesophageal reflux disease)     Hyperlipidemia     Mitral regurgitation     Pacemaker     Pleural effusion     Rib fractures     SSS (sick sinus syndrome) (HCC)    [2]   Past Surgical History:  Procedure Laterality Date    BLADDER TUMOR EXCISION      CARDIAC ELECTROPHYSIOLOGY PROCEDURE N/A 10/21/2021    PPM generator change - dual. Surgeon: Edy Hyde  MD    CARDIAC PACEMAKER PLACEMENT  06/09/2009 2009, generator replacement 10/2021    HERNIA REPAIR      X2    THORACOSCOPY VIDEO ASSISTED SURGERY (VATS) Left 11/21/2020    THORACOSCOPY VIDEO ASSISTED SURGERY (VATS), washout of hemothorax, decortication. Surgeon: Alex Eduardo MD;  Location: [facility];  Service: Thoracic    TONSILLECTOMY     [3]   Social History  Tobacco Use    Smoking status: Never     Passive exposure: Never    Smokeless tobacco: Never   Vaping Use    Vaping status: Never Used   Substance and Sexual Activity    Alcohol use: Not Currently    Drug use: Not Currently    Sexual activity: Not Currently   [4]   Family History  Problem Relation Name Age of Onset    Lung cancer Mother      Colon cancer Father      COPD Sister     [5]   Social History  Tobacco Use    Smoking status: Never     Passive exposure: Never    Smokeless tobacco: Never   Vaping Use    Vaping status: Never Used   Substance and Sexual Activity    Alcohol use: Not Currently    Drug use: Not Currently    Sexual activity: Not Currently

## 2025-06-19 NOTE — ASSESSMENT & PLAN NOTE
Preserved EF 40% March 2025  Continue PTA metoprolol succinate  No ACE/ARB due to history of hypotension  Euvolemic without diuretics  Monitor volume status

## 2025-06-19 NOTE — PLAN OF CARE
Problem: PAIN - ADULT  Goal: Verbalizes/displays adequate comfort level or baseline comfort level  Description: Interventions:  - Encourage patient to monitor pain and request assistance  - Assess pain using appropriate pain scale  - Administer analgesics as ordered based on type and severity of pain and evaluate response  - Implement non-pharmacological measures as appropriate and evaluate response  - Consider cultural and social influences on pain and pain management  - Notify physician/advanced practitioner if interventions unsuccessful or patient reports new pain  - Educate patient/family on pain management process including their role and importance of  reporting pain   - Provide non-pharmacologic/complimentary pain relief interventions  Outcome: Progressing     Problem: INFECTION - ADULT  Goal: Absence or prevention of progression during hospitalization  Description: INTERVENTIONS:  - Assess and monitor for signs and symptoms of infection  - Monitor lab/diagnostic results  - Monitor all insertion sites, i.e. indwelling lines, tubes, and drains  - Monitor endotracheal if appropriate and nasal secretions for changes in amount and color  - [employer] appropriate cooling/warming therapies per order  - Administer medications as ordered  - Instruct and encourage patient and family to use good hand hygiene technique  - Identify and instruct in appropriate isolation precautions for identified infection/condition  Outcome: Progressing  Goal: Absence of fever/infection during neutropenic period  Description: INTERVENTIONS:  - Monitor WBC  - Perform strict hand hygiene  - Limit to healthy visitors only  - No plants, dried, fresh or silk flowers with morales in patient room  Outcome: Progressing     Problem: SAFETY ADULT  Goal: Patient will remain free of falls  Description: INTERVENTIONS:  - Educate patient/family on patient safety including physical limitations  - Instruct patient to call for assistance with activity   -  Consider consulting OT/PT to assist with strengthening/mobility based on AM PAC & JH-HLM score  - Consult OT/PT to assist with strengthening/mobility   - Keep Call bell within reach  - Keep bed low and locked with side rails adjusted as appropriate  - Keep care items and personal belongings within reach  - Initiate and maintain comfort rounds  - Make Fall Risk Sign visible to staff  - Offer Toileting every 2 Hours, in advance of need  - Initiate/Maintain bed alarm  - Obtain necessary fall risk management equipment walker   - Apply yellow socks and bracelet for high fall risk patients  - Consider moving patient to room near nurses station  Outcome: Progressing  Goal: Maintain or return to baseline ADL function  Description: INTERVENTIONS:  -  Assess patient's ability to carry out ADLs; assess patient's baseline for ADL function and identify physical deficits which impact ability to perform ADLs (bathing, care of mouth/teeth, toileting, grooming, dressing, etc.)  - Assess/evaluate cause of self-care deficits   - Assess range of motion  - Assess patient's mobility; develop plan if impaired  - Assess patient's need for assistive devices and provide as appropriate  - Encourage maximum independence but intervene and supervise when necessary  - Involve family in performance of ADLs  - Assess for home care needs following discharge   - Consider OT consult to assist with ADL evaluation and planning for discharge  - Provide patient education as appropriate  - Monitor functional capacity and physical performance, use of AM PAC & JH-HLM   - Monitor gait, balance and fatigue with ambulation    Outcome: Progressing  Goal: Maintains/Returns to pre admission functional level  Description: INTERVENTIONS:  - Perform AM-PAC 6 Click Basic Mobility/ Daily Activity assessment daily.  - Set and communicate daily mobility goal to care team and patient/family/caregiver.   - Collaborate with rehabilitation services on mobility goals if  consulted  - Perform Range of Motion 3 times a day.  - Reposition patient every 2 hours.  - Dangle patient 3 times a day  - Stand patient 3 times a day  - Ambulate patient 3 times a day  - Out of bed to chair 3 times a day   - Out of bed for meals 3 times a day  - Out of bed for toileting  - Record patient progress and toleration of activity level   Outcome: Progressing     Problem: DISCHARGE PLANNING  Goal: Discharge to home or other facility with appropriate resources  Description: INTERVENTIONS:  - Identify barriers to discharge w/patient and caregiver  - Arrange for needed discharge resources and transportation as appropriate  - Identify discharge learning needs (meds, wound care, etc.)  - Arrange for interpretive services to assist at discharge as needed  - Refer to Case Management Department for coordinating discharge planning if the patient needs post-hospital services based on physician/advanced practitioner order or complex needs related to functional status, cognitive ability, or social support system  Outcome: Progressing     Problem: Knowledge Deficit  Goal: Patient/family/caregiver demonstrates understanding of disease process, treatment plan, medications, and discharge instructions  Description: Complete learning assessment and assess knowledge base.  Interventions:  - Provide teaching at level of understanding  - Provide teaching via preferred learning methods  Outcome: Progressing

## 2025-06-19 NOTE — PLAN OF CARE
Problem: PAIN - ADULT  Goal: Verbalizes/displays adequate comfort level or baseline comfort level  Description: Interventions:  - Encourage patient to monitor pain and request assistance  - Assess pain using appropriate pain scale  - Administer analgesics as ordered based on type and severity of pain and evaluate response  - Implement non-pharmacological measures as appropriate and evaluate response  - Consider cultural and social influences on pain and pain management  - Notify physician/advanced practitioner if interventions unsuccessful or patient reports new pain  - Educate patient/family on pain management process including their role and importance of  reporting pain   - Provide non-pharmacologic/complimentary pain relief interventions  Outcome: Progressing     Problem: INFECTION - ADULT  Goal: Absence or prevention of progression during hospitalization  Description: INTERVENTIONS:  - Assess and monitor for signs and symptoms of infection  - Monitor lab/diagnostic results  - Monitor all insertion sites, i.e. indwelling lines, tubes, and drains  - Monitor endotracheal if appropriate and nasal secretions for changes in amount and color  - [place] appropriate cooling/warming therapies per order  - Administer medications as ordered  - Instruct and encourage patient and family to use good hand hygiene technique  - Identify and instruct in appropriate isolation precautions for identified infection/condition  Outcome: Progressing  Goal: Absence of fever/infection during neutropenic period  Description: INTERVENTIONS:  - Monitor WBC  - Perform strict hand hygiene  - Limit to healthy visitors only  - No plants, dried, fresh or silk flowers with morales in patient room  Outcome: Progressing     Problem: SAFETY ADULT  Goal: Patient will remain free of falls  Description: INTERVENTIONS:  - Educate patient/family on patient safety including physical limitations  - Instruct patient to call for assistance with activity   -  Consider consulting OT/PT to assist with strengthening/mobility based on AM PAC & JH-HLM score  - Consult OT/PT to assist with strengthening/mobility   - Keep Call bell within reach  - Keep bed low and locked with side rails adjusted as appropriate  - Keep care items and personal belongings within reach  - Initiate and maintain comfort rounds  - Make Fall Risk Sign visible to staff  - Offer Toileting every 2 Hours, in advance of need  - Initiate/Maintain alarm  - Obtain necessary fall risk management equipment  - Apply yellow socks and bracelet for high fall risk patients  - Consider moving patient to room near nurses station  Outcome: Progressing  Goal: Maintain or return to baseline ADL function  Description: INTERVENTIONS:  -  Assess patient's ability to carry out ADLs; assess patient's baseline for ADL function and identify physical deficits which impact ability to perform ADLs (bathing, care of mouth/teeth, toileting, grooming, dressing, etc.)  - Assess/evaluate cause of self-care deficits   - Assess range of motion  - Assess patient's mobility; develop plan if impaired  - Assess patient's need for assistive devices and provide as appropriate  - Encourage maximum independence but intervene and supervise when necessary  - Involve family in performance of ADLs  - Assess for home care needs following discharge   - Consider OT consult to assist with ADL evaluation and planning for discharge  - Provide patient education as appropriate  - Monitor functional capacity and physical performance, use of AM PAC & JH-HLM   - Monitor gait, balance and fatigue with ambulation    Outcome: Progressing  Goal: Maintains/Returns to pre admission functional level  Description: INTERVENTIONS:  - Perform AM-PAC 6 Click Basic Mobility/ Daily Activity assessment daily.  - Set and communicate daily mobility goal to care team and patient/family/caregiver.   - Collaborate with rehabilitation services on mobility goals if consulted  -  Perform Range of Motion - times a day.  - Reposition patient every - hours.  - Dangle patient - times a day  - Stand patient - times a day  - Ambulate patient - times a day  - Out of bed to chair - times a day   - Out of bed for meals - times a day  - Out of bed for toileting  - Record patient progress and toleration of activity level   Outcome: Progressing     Problem: DISCHARGE PLANNING  Goal: Discharge to home or other facility with appropriate resources  Description: INTERVENTIONS:  - Identify barriers to discharge w/patient and caregiver  - Arrange for needed discharge resources and transportation as appropriate  - Identify discharge learning needs (meds, wound care, etc.)  - Arrange for interpretive services to assist at discharge as needed  - Refer to Case Management Department for coordinating discharge planning if the patient needs post-hospital services based on physician/advanced practitioner order or complex needs related to functional status, cognitive ability, or social support system  Outcome: Progressing     Problem: Knowledge Deficit  Goal: Patient/family/caregiver demonstrates understanding of disease process, treatment plan, medications, and discharge instructions  Description: Complete learning assessment and assess knowledge base.  Interventions:  - Provide teaching at level of understanding  - Provide teaching via preferred learning methods  Outcome: Progressing

## 2025-06-19 NOTE — ASSESSMENT & PLAN NOTE
Continue metoprolol twice daily dose decreased due to hypotension and anticoagulation with Eliquis  PPM in place

## 2025-06-19 NOTE — PHYSICAL THERAPY NOTE
PHYSICAL THERAPY NOTE          Patient Name: Madhu Bach  Today's Date: 6/19/2025 06/19/25 1201   Note Type   Note type Evaluation;Cancelled Session   Cancel Reasons Medical status         Received order for PT consult. Chart reviewed. Patient admitted with diagnosis stroke-like symptom. Patient was hypotensive and bradycardic at end of OT session this AM. Dr. Frazier recommended holding PT this AM. Reached out to Dr. Frazier regarding improved /71 and HR 61 bpm and appropriateness for PT this PM. Dr. Frazier recommending continue to hold PT this date. Will cancel and see patient as medically appropriate at a later time.    Glenny Simmons, PT,DPT

## 2025-06-19 NOTE — ASSESSMENT & PLAN NOTE
Presented from home after episode of bilateral lower extremity numbness and weakness, difficulty ambulating.  Originally seen at outside hospital with recommendations for admission but patient declined and returned home where he had another episode that resolved prior to admission  CTA without infarct/bleed/LVO or stenosis  Admit under stroke protocol.  NIH 0  Check MRI brain with and without contrast  Vitamin B12, vitamin D, TSH normal, A1c, lipid panel  Continue PTA Eliquis  Aspirin 81 mg daily pending MRI  Atorvastatin  Telemetry  PT/OT/case management  Formal neurology consultation

## 2025-06-19 NOTE — H&P
H&P - Hospitalist   Name: Madhu Bach 84 y.o. male I MRN: [id number]  Unit/Bed#: -01 I Date of Admission: 6/18/2025   Date of Service: 6/19/2025 I Hospital Day: 0     Assessment & Plan  Stroke-like symptom  Presented from home after episode of bilateral lower extremity numbness and weakness, difficulty ambulating.  Originally seen at outside hospital with recommendations for admission but patient declined and returned home where he had another episode that resolved prior to admission  CTA without infarct/bleed/LVO or stenosis  Admit under stroke protocol.  NIH 0  Check MRI brain with and without contrast  Vitamin B12, vitamin D, TSH normal, A1c, lipid panel  Continue PTA Eliquis  Aspirin 81 mg daily pending MRI  Atorvastatin  Telemetry  PT/OT/case management  Formal neurology consultation  Other specified hypothyroidism  Continue PTA levothyroxine  TSH 3.588  Dilated cardiomyopathy (HCC)  Preserved EF 40% March 2025  Continue PTA metoprolol succinate  No ACE/ARB due to history of hypotension  Euvolemic without diuretics  Monitor volume status  Paroxysmal atrial fibrillation (HCC)  Continue metoprolol twice daily and anticoagulation with Eliquis  PPM in place      VTE Pharmacologic Prophylaxis:   High Risk (Score >/= 5) - Pharmacological DVT Prophylaxis Ordered: apixaban (Eliquis). Sequential Compression Devices Ordered.  Code Status: Level 1 - Full Code   Discussion with family: Patient declined call to .     Anticipated Length of Stay: Patient will be admitted on an observation basis with an anticipated length of stay of less than 2 midnights secondary to stroke symptoms.    History of Present Illness   Chief Complaint: Generalized weakness/ataxic gait    Madhu Bach is a 84 y.o. male with a PMH of RLS on ropinirole 2 mg twice daily and gabapentin 3 times daily, history low vitamin B12 and vitamin D on supplementation, polyneuropathy, dilated cardiomyopathy, PAF on Eliquis, SSS PPM in  place, hyperlipidemia tachybradycardia syndrome who presents with unbalanced gait.  Patient normally bicycles 12 to 20 miles on a regular basis but over the past week he has been having intermittent episodes of ataxic gait ambulatory dysfunction.  Today while walking around an outside markets had a episode where he was unable to walk in a straight line prompting his evaluation at the [facility] emergency department and he was recommended admission but opted for discharge home.  Upon returning home another episode occurred where he was unable to ambulate prompting him to return to this hospital and be admitted under stroke protocol.  At time of admission denies paresthesias or weakness.  But earlier in the day reported bilateral lower extremity paresthesia and weakness with ataxic gait.    Review of Systems   Constitutional:  Negative for chills and fever.   HENT:  Negative for ear pain and sore throat.    Eyes:  Negative for pain and visual disturbance.   Respiratory:  Negative for cough and shortness of breath.    Cardiovascular:  Negative for chest pain and palpitations.   Gastrointestinal:  Negative for abdominal pain and vomiting.   Genitourinary:  Negative for dysuria and hematuria.   Musculoskeletal:  Positive for gait problem. Negative for arthralgias and back pain.   Skin:  Negative for color change and rash.   Neurological:  Positive for weakness and numbness. Negative for seizures and syncope.   All other systems reviewed and are negative.      Historical Information   Past Medical History[1]  Past Surgical History[2]  Social History[3]  E-Cigarette/Vaping    E-Cigarette Use Never User      E-Cigarette/Vaping Substances    Nicotine No     THC No     CBD No     Flavoring No      Family History[4]  Social History:  Marital Status: /Civil Union   Occupation: Retired  Patient Pre-hospital Living Situation: Home  Patient Pre-hospital Level of Mobility: walks  Patient Pre-hospital Diet Restrictions:  None    Meds/Allergies   I have reviewed home medications with patient personally.  Prior to Admission medications    Medication Sig Start Date End Date Taking? Authorizing Provider   Cetirizine HCl (ZyrTEC Allergy) 10 MG CAPS Take 10 mg by mouth in the morning   Yes Historical Provider, MD   Cholecalciferol 50 MCG (2000 UT) CAPS Take by mouth 6/4/21  Yes Historical Provider, MD   Coenzyme Q10 (CO Q 10 PO) Take by mouth   Yes Historical Provider, MD   Cyanocobalamin (VITAMIN B 12 PO) Take by mouth   Yes Historical Provider, MD   Eliquis 5 MG Take 1 tablet (5 mg total) by mouth 2 (two) times a day 10/29/21  Yes IVY Bear   gabapentin (NEURONTIN) 100 mg capsule Take 100 mg by mouth in the morning and 100 mg in the evening and 100 mg before bedtime. 8/28/24  Yes Historical Provider, MD   levothyroxine 25 mcg tablet  8/11/21  Yes Historical Provider, MD   metoprolol succinate (TOPROL-XL) 50 mg 24 hr tablet Take 1 tablet (50 mg total) by mouth 2 (two) times a day 4/4/25  Yes IVY Bear   Multiple Vitamins-Minerals (PreserVision AREDS 2) CAPS Take 1 capsule by mouth in the morning   Yes Historical Provider, MD   pantoprazole (PROTONIX) 40 mg tablet Take 40 mg by mouth in the morning. 2/3/23  Yes Historical Provider, MD   rOPINIRole (REQUIP) 2 mg tablet Take 2 mg by mouth in the morning and 2 mg before bedtime. 2/7/25  Yes Historical Provider, MD   simvastatin (ZOCOR) 40 mg tablet Take 40 mg by mouth daily at bedtime   Yes Historical Provider, MD   folic acid (FOLVITE) 1 mg tablet Take 1 tablet by mouth in the morning  Patient not taking: Reported on 6/18/2025 12/23/24   Historical Provider, MD   Glucosamine-Chondroit-Vit C-Mn (Glucosamine Chondr 1500 Complx) CAPS Take 1,500 capsules by mouth in the morning  Patient not taking: Reported on 6/18/2025    Historical Provider, MD   ondansetron (ZOFRAN) 4 mg tablet Take 4 mg by mouth every 8 (eight) hours as needed 12/23/24   Historical Provider, MD      Allergies   Allergen Reactions    Penicillins Anaphylaxis and Hives       Objective :  Temp:  [96.8 °F (36 °C)-98 °F (36.7 °C)] 97.3 °F (36.3 °C)  HR:  [60-78] 62  BP: (110-163)/() 114/68  Resp:  [13-22] 18  SpO2:  [93 %-99 %] 93 %  O2 Device: None (Room air)    Physical Exam  Vitals and nursing note reviewed.   Constitutional:       General: He is not in acute distress.     Appearance: He is well-developed.   HENT:      Head: Normocephalic and atraumatic.      Mouth/Throat:      Mouth: Mucous membranes are moist.     Eyes:      Conjunctiva/sclera: Conjunctivae normal.       Cardiovascular:      Rate and Rhythm: Normal rate and regular rhythm.      Heart sounds: No murmur heard.  Pulmonary:      Effort: Pulmonary effort is normal. No respiratory distress.      Breath sounds: Normal breath sounds.   Abdominal:      Palpations: Abdomen is soft.      Tenderness: There is no abdominal tenderness.     Musculoskeletal:         General: No swelling.      Cervical back: Neck supple.     Skin:     General: Skin is warm and dry.      Capillary Refill: Capillary refill takes less than 2 seconds.     Neurological:      General: No focal deficit present.      Mental Status: He is alert and oriented to person, place, and time.     Psychiatric:         Mood and Affect: Mood normal.         Behavior: Behavior normal.         Lab Results: I have reviewed the following results:  Results from last 7 days   Lab Units 06/18/25 1922   WBC Thousand/uL 5.96   HEMOGLOBIN g/dL 13.3   HEMATOCRIT % 40.2   PLATELETS Thousands/uL 177   SEGS PCT % 49   LYMPHO PCT % 36   MONO PCT % 10   EOS PCT % 3     Results from last 7 days   Lab Units 06/18/25 1922   SODIUM mmol/L 138   POTASSIUM mmol/L 4.4   CHLORIDE mmol/L 105   CO2 mmol/L 27   BUN mg/dL 23   CREATININE mg/dL 0.99   ANION GAP mmol/L 6   CALCIUM mg/dL 9.2   ALBUMIN g/dL 4.0   TOTAL BILIRUBIN mg/dL 0.49   ALK PHOS U/L 47   ALT U/L 19   AST U/L 21   GLUCOSE RANDOM mg/dL 100      Results from last 7 days   Lab Units 06/18/25 1922   INR  1.04         Lab Results   Component Value Date    HGBA1C 6.2 (H) 01/06/2025    HGBA1C 6.0 (H) 02/04/2022    HGBA1C 5.9 (H) 11/18/2020           Imaging Results Review: I reviewed radiology reports from this admission including: CT head.  Other Study Results Review: EKG was reviewed.         ** Please Note: This note has been constructed using a voice recognition system. **         [1]   Past Medical History:  Diagnosis Date    Bladder cancer (HCC)     Dilated aortic root (HCC)     Dilated cardiomyopathy (HCC)     DVT (deep venous thrombosis) (HCC)     GERD (gastroesophageal reflux disease)     Hyperlipidemia     Mitral regurgitation     Pacemaker     Pleural effusion     Rib fractures     SSS (sick sinus syndrome) (HCC)    [2]   Past Surgical History:  Procedure Laterality Date    BLADDER TUMOR EXCISION      CARDIAC ELECTROPHYSIOLOGY PROCEDURE N/A 10/21/2021    PPM generator change - dual. Surgeon: Edy Hyde MD    CARDIAC PACEMAKER PLACEMENT  06/09/2009 2009, generator replacement 10/2021    HERNIA REPAIR      X2    THORACOSCOPY VIDEO ASSISTED SURGERY (VATS) Left 11/21/2020    THORACOSCOPY VIDEO ASSISTED SURGERY (VATS), washout of hemothorax, decortication. Surgeon: Alex Eduardo MD;  Location: [facility];  Service: Thoracic    TONSILLECTOMY     [3]   Social History  Tobacco Use    Smoking status: Never     Passive exposure: Never    Smokeless tobacco: Never   Vaping Use    Vaping status: Never Used   Substance and Sexual Activity    Alcohol use: Not Currently    Drug use: Not Currently    Sexual activity: Not Currently   [4]   Family History  Problem Relation Name Age of Onset    Lung cancer Mother      Colon cancer Father      COPD Sister

## 2025-06-19 NOTE — PLAN OF CARE
Problem: OCCUPATIONAL THERAPY ADULT  Goal: Performs self-care activities at highest level of function for planned discharge setting.  See evaluation for individualized goals.  Description: Treatment Interventions: Functional transfer training, Endurance training, Compensatory technique education, Cardiac education, Energy conservation, Activityengagement, Patient/family training          See flowsheet documentation for full assessment, interventions and recommendations.   Note: Limitation: Decreased endurance, Decreased self-care trans, Decreased high-level ADLs  Prognosis: Good  Assessment: Pt is 84 y.o., male, evaluated at [facility] 06/19/25 due to Stroke-like symptom. OT order placed to assess Pt's ADLs, cognitive status, and performance during functional tasks in order to maximize safety and independence while making most appropriate d/c recommendations. An occupational profile and medical/therapy history were completed, including a extensive review of physical, cognitive, psychosocial history related to pt’s current functional performance. Pt with PMHx impacting their performance during ADL tasks include: a-fib, pacemaker, dilated cardiomyopathy, bladder cancer, DVT.  Performance deficits/impairments identified at time of initial evaluation, that are impacting Pt's occupational performance and ability to safely return to [facility], include decreased toileting, decreased LB bathing, decreased leisure activities, decreased driving/community mobility, decreased functional household distances, and decreased functional community distances/decreased endurance, decreased activity tolerance, and hearing impairment. Personal factors such as advanced age, multilevel home environment, stair(s) to enter home environment, inability to ambulate household distances, inability to ambulate community distances, need for assistance with IADLs, limited insight to deficits, decreased initiation and engagement, and decreased community  engagement, and medical complications of hypotension, poor blood pressure control, and A-fib are also affecting pt and may cause a barrier to discharge. In consideration of history obtained, identification of performance deficits, comorbidities that affect occupational performance, clinical presentation/decision-making, and modification of tasks/assistance required to enable pt to complete evaluation components, this evaluation is determined to be of high complexity. Pt will benefit from continued skilled acute OT services to address barriers as defined above and to maximize level of independence/participation during ADLs and functional tasks to facilitate return toward PLOF and improved QoL. Pt would benefit from skilled OT intervention to address comprehensive ADL, therapeutic exercise, functional mobility, endurance, and tolerance to upright position to maximize the potential of meeting client centered goals. From an OT standpoint, Level III (Minimum Resource Intensity is recommended upon d/c.     Rehab Resource Intensity Level, OT: III (Minimum Resource Intensity)

## 2025-06-19 NOTE — ASSESSMENT & PLAN NOTE
Madhu Bach is a 84 y.o. left handed male with RLS on ropinirole 2 mg twice daily and gabapentin 3 times daily, polyneuropathy,  dilated cardiomyopathy, PAF on Eliquis, SSS PPM in place, hyperlipidemia tachybradycardia syndrome who presents with gait issues. NIHSS 0 but he is still unsteady upon walking with therapist in am.     - Admit to stroke pathway, MRI brain, Echo, A1c, lipid panel, telemetry. Repeat CTH if unable to tolerate MRI  - Permissive HTN for now, BP no greater than 180/100 mmHg  - Continue  Eliquis and statin   - Vascular risk factor management, A1c goal <6.5, LDL goal <70  - PT/OT/ST

## 2025-06-19 NOTE — ASSESSMENT & PLAN NOTE
Patient is an active individual actively bicycles and drives in the community.  The last few days he has been noticing some dizziness and lightheadedness.  Yesterday he got admitted because of generalized weakness of both legs.  Today after his occupational therapy session initially his orthostatic vitals were normal but then a few minutes later his blood pressure dropped to 70s systolics got dizzy lightheaded diaphoretic and he was laid down and a little while later his blood pressure recovered.  Will check cortisol level decrease metoprolol dose continue to monitor on telemetry and check orthostatics again tomorrow with activity  place MILDRED stockings

## 2025-06-19 NOTE — ASSESSMENT & PLAN NOTE
Presented from home after episode of bilateral lower extremity numbness and weakness, difficulty ambulating.  Originally seen at outside hospital with recommendations for admission but patient declined and returned home where he had another episode that resolved prior to admission  CTA without infarct/bleed/LVO or stenosis  Admit under stroke protocol.  NIH 0  Check MRI brain with and without contrast  Low Vitamin B12, normal vitamin D, TSH normal, A1c, lipid panel  Continue PTA Eliquis  Aspirin 81 mg daily pending MRI  Atorvastatin  Telemetry  PT/OT/case management  Formal neurology consultation  Suspect his symptoms are also happening due to hypotension. Need to check orthostatic vitals.decrease metoprolol dose

## 2025-06-19 NOTE — ASSESSMENT & PLAN NOTE
Preserved EF 40% March 2025  hold PTA metoprolol succinate due to hypotension and placed on metoprolol tartarate 12.5 mg bid  No ACE/ARB due to history of hypotension  Euvolemic without diuretics  Monitor volume status

## 2025-06-19 NOTE — OCCUPATIONAL THERAPY NOTE
Occupational Therapy Evaluation     Patient Name: Madhu Bach  Today's Date: 6/19/2025  Problem List  Principal Problem:    Stroke-like symptom  Active Problems:    Dilated cardiomyopathy (HCC)    Other specified hypothyroidism    Paroxysmal atrial fibrillation (HCC)    Past Medical History  Past Medical History[1]  Past Surgical History  Past Surgical History[2]        06/19/25 0802   OT Last Visit   OT Visit Date 06/19/25   Note Type   Note type Evaluation   Pain Assessment   Pain Assessment Tool 0-10   Pain Score No Pain   Restrictions/Precautions   Weight Bearing Precautions Per Order No   Other Precautions Chair Alarm;Bed Alarm;Fall Risk;Hard of hearing  (negative for orthostatic hypotension, but had hypotensive episode after sitting in recliner for about 10 minutes)   Home Living   Type of Home House  (1 MY from garage entry; Bi-level home)   Home Layout Two level;Performs ADLs on one level  (FF with B HR to upstairs; bedroom and kitchen on first floor.)   Bathroom Shower/Tub Tub/shower unit   Bathroom Toilet Standard   Bathroom Equipment Grab bars in shower   Home Equipment   (has an old walker from a relative, uses no device at baseline for mobility)   Additional Comments (S)  Pt lives on first floor; second floor has living room that he spends some time in and his exercise room that he rides a stationary bike if it is raining. If it is nice outside, the pt rides 10 miles on his bike each day. 2 weeks ago he went on a 25 mile/day bike ride.   Prior Function   Level of [place] Independent with ADLs;Independent with functional mobility;Independent with IADLS   Lives With Spouse   Receives Help From Family  (wife; son works lives 4 miles away and works during the day)   IADLs Independent with driving;Family/Friend/Other provides meals;Independent with medication management  (wife cooks)   Falls in the last 6 months 0   Vocational Retired  (owned a textile mill)   Lifestyle   Intrinsic Gratification  "Pt enjoys riding his bicycle daily   Subjective   Subjective \"I played against Merrill Garcia\"   ADL   Where Assessed Edge of bed   UB Dressing Assistance 7  Independent   LB Dressing Assistance 7  Independent   LB Dressing Deficit   (donning/doffing B socks)   Bed Mobility   Supine to Sit 6  Modified independent   Additional items HOB elevated   Transfers   Sit to Stand 5  Supervision   Additional items Increased time required;Verbal cues   Stand to Sit 5  Supervision   Additional items Verbal cues;Increased time required   Stand pivot 5  Supervision   Additional items Increased time required;Verbal cues   Additional Comments Pt completed short distance transfer with SPV and no device. Further distance not assessed due to lightheadedness and pt feeling 'off.' Pt typically community ambulator and very active at baseline.   Functional Mobility   Functional Mobility 5  Supervision   Additional Comments Pt completed short distance transfer with SPV and no device. Further distance not assessed due to lightheadedness and pt feeling 'off.' Pt typically community ambulator and very active at baseline.   Balance   Static Sitting Normal   Dynamic Sitting Normal   Static Standing Fair   Dynamic Standing Fair -   Activity Tolerance   Activity Tolerance Patient limited by fatigue;Treatment limited secondary to medical complications (Comment)  (hypotension)   Medical Staff Made Aware PT DINESH [place], Dr. Frazier   Nurse Made Aware MARCO A Bueno   RUEDD Assessment   RUE Assessment WFL  (strength grossly 4-/5)   LUE Assessment   LUE Assessment WFL  (strength grossly 4-/5)   Hand Function   Gross Motor Coordination Functional   Fine Motor Coordination Functional   Hand Function Comments L Hand Dominant   Psychosocial   Psychosocial (WDL) WDL   Cognition   Overall Cognitive Status WFL   Arousal/Participation Alert;Responsive;Cooperative   Attention Within functional limits   Orientation Level Oriented X4   Memory Within functional " limits   Following Commands Follows one step commands without difficulty   Assessment   Limitation Decreased endurance;Decreased self-care trans;Decreased high-level ADLs   Prognosis Good   Assessment Pt is 84 y.o., male, evaluated at [facility], [place] 06/19/25 due to Stroke-like symptom. OT order placed to assess Pt's ADLs, cognitive status, and performance during functional tasks in order to maximize safety and independence while making most appropriate d/c recommendations. An occupational profile and medical/therapy history were completed, including a extensive review of physical, cognitive, psychosocial history related to pt’s current functional performance. Pt with PMHx impacting their performance during ADL tasks include: a-fib, pacemaker, dilated cardiomyopathy, bladder cancer, DVT.  Performance deficits/impairments identified at time of initial evaluation, that are impacting Pt's occupational performance and ability to safely return to [facility], include decreased toileting, decreased LB bathing, decreased leisure activities, decreased driving/community mobility, decreased functional household distances, and decreased functional community distances/decreased endurance, decreased activity tolerance, and hearing impairment. Personal factors such as advanced age, multilevel home environment, stair(s) to enter home environment, inability to ambulate household distances, inability to ambulate community distances, need for assistance with IADLs, limited insight to deficits, decreased initiation and engagement, and decreased community engagement, and medical complications of hypotension, poor blood pressure control, and A-fib are also affecting pt and may cause a barrier to discharge. In consideration of history obtained, identification of performance deficits, comorbidities that affect occupational performance, clinical presentation/decision-making, and modification of tasks/assistance required to enable pt to complete evaluation  components, this evaluation is determined to be of high complexity. Pt will benefit from continued skilled acute OT services to address barriers as defined above and to maximize level of independence/participation during ADLs and functional tasks to facilitate return toward PLOF and improved QoL. Pt would benefit from skilled OT intervention to address comprehensive ADL, therapeutic exercise, functional mobility, endurance, and tolerance to upright position to maximize the potential of meeting client centered goals. From an OT standpoint, Level III (Minimum Resource Intensity is recommended upon d/c.   Plan   Treatment Interventions Functional transfer training;Endurance training;Compensatory technique education;Cardiac education;Energy conservation;Activityengagement;Patient/family training   Goal Expiration Date 07/03/25   OT Treatment Day 1   OT Frequency 2-3x/wk   Discharge Recommendation   Rehab Resource Intensity Level, OT III (Minimum Resource Intensity)   AM-PAC Daily Activity Inpatient   Lower Body Dressing 4   Bathing 3   Toileting 3   Upper Body Dressing 4   Grooming 4   Eating 4   Daily Activity Raw Score 22   Daily Activity Standardized Score (Calc for Raw Score >=11) 47.1   AM-PAC Applied Cognition Inpatient   Following a Speech/Presentation 4   Understanding Ordinary Conversation 4   Taking Medications 4   Remembering Where Things Are Placed or Put Away 4   Remembering List of 4-5 Errands 4   Taking Care of Complicated Tasks 3   Applied Cognition Raw Score 23   Applied Cognition Standardized Score 53.08   Additional Treatment Session   Start Time 0838   End Time 0850   Treatment Assessment Treatment session initiated with pt sitting in recliner with legs extended. Pt reported symptoms of being diaphoretic and presented as sweaty, nauseous, and lightheaded. BP taken and nursing notified immediately. Nursing assessed patient immediately. Therapist also monitored and recorded vital signs. Therapist  placed pt in reclined position and manually elevated legs. Pt reported his symptoms subsided in fully reclined position. Nursing continuing to monitor pt. Therapist plan was to further mobilize pt, but it is determined not to be safe at this time. Pt remained with nursing in recliner. Continue with plan of care.   Additional Treatment Day 1   End of Consult   Education Provided Yes   Patient Position at End of Consult Bedside chair;Bed/Chair alarm activated;All needs within reach   Nurse Communication Nurse aware of consult        06/19/25 0817 06/19/25 0819 06/19/25 0823   Vitals   Pulse 62 77 62   Blood Pressure 104/61 109/65 103/82   MAP (mmHg) 75 80 89   BP Location Left arm Left arm Left arm   BP Method Automatic Automatic Automatic   Patient Position - Orthostatic VS Sitting Standing Standing for 3 minutes - Orthostatic VS   Oxygen Therapy   SpO2 96 % 93 % 94 %   O2 Device None (Room air) None (Room air) None (Room air)      06/19/25 0839 06/19/25 0842 06/19/25 0845   Vitals   Pulse (!) 42 (!) 39 (!) 36   Blood Pressure  --  (!) 73/40  (diaphoretic and reported feeling 'off,' nauseous, and lightheaded since sitting up in recliner for about 10 minutes) (!) 80/38   MAP (mmHg)  --  (!) 51  --    BP Location  --  Left arm Left arm   BP Method  --  Automatic Manual   Patient Position - Orthostatic VS  --  Sitting  (with legs elevated in recliner) Sitting  (with legs elevated in recliner)   Oxygen Therapy   SpO2 93 % 94 % 93 %   O2 Device None (Room air) None (Room air) None (Room air)     The patient's raw score on the -PAC Daily Activity Inpatient Short Form is 22. A raw score of greater than or equal to 19 suggests the patient may benefit from discharge to home. Please refer to the recommendation of the Occupational Therapist for safe discharge planning.    Pt goals to be met by 7/3/2025    Pt will demonstrate ability to complete toileting tasks including CM and pericare @ independent in order to increase  safety and independence during meaningful tasks.  Pt will demonstrate ability to complete EOB, chair, toilet/commode transfers @ independent in order to increase performance and participation during functional tasks.  Pt will demonstrate ability to stand for 30 minutes while maintaining good balance with use of no device for UB support PRN.  Pt will demonstrate ability to tolerate 30-35 minute OT session with no vc'ing for deep breathing or use of energy conservation techniques in order to increase activity tolerance during functional tasks.   Pt will demonstrate Good carryover of use of energy conservation/compensatory strategies during ADLs and functional tasks in order to increase safety and reduce risk for falls.   Pt will demonstrate Good attention and participation in continued evaluation of functional ambulation house hold distances in order to assist with safe d/c planning.  Pt will attend to continued cognitive assessments 100% of the time in order to provide most appropriate d/c recommendations.   Pt will follow 100% simple 2-step commands and be A&O x4 consistently with environmental cues to increase participation in functional activities.   Pt will identify 3 areas of interest/hobbies and 1 intervention on how to incorporate into daily life in order to increase interaction with environment and peers as well as increase participation in meaningful tasks.   Pt will demonstrate 100% carryover of BUE HEP in order to increase BUE MS and increase performance during functional tasks upon d/c home.    Rey Valdes, EJR/L           [1]   Past Medical History:  Diagnosis Date    Bladder cancer (HCC)     Dilated aortic root (HCC)     Dilated cardiomyopathy (HCC)     DVT (deep venous thrombosis) (HCC)     GERD (gastroesophageal reflux disease)     Hyperlipidemia     Mitral regurgitation     Pacemaker     Pleural effusion     Rib fractures     SSS (sick sinus syndrome) (HCC)    [2]   Past Surgical History:  Procedure  Laterality Date    BLADDER TUMOR EXCISION      CARDIAC ELECTROPHYSIOLOGY PROCEDURE N/A 10/21/2021    PPM generator change - dual. Surgeon: Edy Hyde MD    CARDIAC PACEMAKER PLACEMENT  06/09/2009 2009, generator replacement 10/2021    HERNIA REPAIR      X2    THORACOSCOPY VIDEO ASSISTED SURGERY (VATS) Left 11/21/2020    THORACOSCOPY VIDEO ASSISTED SURGERY (VATS), washout of hemothorax, decortication. Surgeon: Alex Eduardo MD;  Location: [facility];  Service: Thoracic    TONSILLECTOMY

## 2025-06-19 NOTE — PROGRESS NOTES
Progress Note - Hospitalist   Name: Madhu Bach 84 y.o. male I MRN: [id number]  Unit/Bed#: -01 I Date of Admission: 6/18/2025   Date of Service: 6/19/2025 I Hospital Day: 0    Assessment & Plan  Stroke-like symptom  Presented from home after episode of bilateral lower extremity numbness and weakness, difficulty ambulating.  Originally seen at outside hospital with recommendations for admission but patient declined and returned home where he had another episode that resolved prior to admission  CTA without infarct/bleed/LVO or stenosis  Admit under stroke protocol.  NIH 0  Check MRI brain with and without contrast  Low Vitamin B12, normal vitamin D, TSH normal, A1c, lipid panel  Continue PTA Eliquis  Aspirin 81 mg daily pending MRI  Atorvastatin  Telemetry  PT/OT/case management  Formal neurology consultation  Suspect his symptoms are also happening due to hypotension. Need to check orthostatic vitals.decrease metoprolol dose   Other specified hypothyroidism  Continue PTA levothyroxine  TSH 3.588  Dilated cardiomyopathy (HCC)  Preserved EF 40% March 2025  hold PTA metoprolol succinate due to hypotension and placed on metoprolol tartarate 12.5 mg bid  No ACE/ARB due to history of hypotension  Euvolemic without diuretics  Monitor volume status  Paroxysmal atrial fibrillation (HCC)  Continue metoprolol twice daily dose decreased due to hypotension and anticoagulation with Eliquis  PPM in place  Orthostatic hypotension  Patient is an active individual actively bicycles and drives in the community.  The last few days he has been noticing some dizziness and lightheadedness.  Yesterday he got admitted because of generalized weakness of both legs.  Today after his occupational therapy session initially his orthostatic vitals were normal but then a few minutes later his blood pressure dropped to 70s systolics got dizzy lightheaded diaphoretic and he was laid down and a little while later his blood pressure  recovered.  Will check cortisol level decrease metoprolol dose continue to monitor on telemetry and check orthostatics again tomorrow with activity  place MILDRED stockings    VTE Pharmacologic Prophylaxis:   Moderate Risk (Score 3-4) - Pharmacological DVT Prophylaxis Ordered: apixaban (Eliquis).    Mobility:   Basic Mobility Inpatient Raw Score: 23  JH-HLM Goal: 7: Walk 25 feet or more  JH-HLM Achieved: 8: Walk 250 feet ot more  JH-HLM Goal achieved. Continue to encourage appropriate mobility.    Patient Centered Rounds: I performed bedside rounds with nursing staff today.   Discussions with Specialists or Other Care Team Provider: Discussed with neurology    Education and Discussions with Family / Patient: Update family    Current Length of Stay: 0 day(s)  Current Patient Status: Inpatient   Certification Statement: The patient will continue to require additional inpatient hospital stay due to generalized weakness and hypotension  Discharge Plan: Anticipate discharge in 24-48 hrs to home with home services.    Code Status: Level 1 - Full Code    Subjective   Patient states that he felt okay this morning but then when he walked around with Occupational Therapy) later he felt very dizzy and lightheaded and sweaty and he almost passed out.  Blood pressure was found to be 70 systolic.    Objective :  Temp:  [96.8 °F (36 °C)-98.5 °F (36.9 °C)] 97 °F (36.1 °C)  HR:  [36-78] 61  BP: ()/() 138/71  Resp:  [13-20] 18  SpO2:  [92 %-99 %] 97 %  O2 Device: None (Room air)    Body mass index is 25.46 kg/m².     Input and Output Summary (last 24 hours):     Intake/Output Summary (Last 24 hours) at 6/19/2025 1256  Last data filed at 6/19/2025 1143  Gross per 24 hour   Intake --   Output 600 ml   Net -600 ml       Physical Exam  Vitals and nursing note reviewed.   Constitutional:       Appearance: Normal appearance.   HENT:      Head: Normocephalic and atraumatic.      Right Ear: External ear normal.      Left Ear:  External ear normal.      Nose: Nose normal.      Mouth/Throat:      Pharynx: Oropharynx is clear.     Cardiovascular:      Rate and Rhythm: Normal rate and regular rhythm.      Heart sounds: Normal heart sounds.   Pulmonary:      Effort: Pulmonary effort is normal.      Breath sounds: Normal breath sounds.   Abdominal:      General: Bowel sounds are normal.      Palpations: Abdomen is soft.      Tenderness: There is no abdominal tenderness.     Musculoskeletal:         General: Normal range of motion.      Cervical back: Normal range of motion and neck supple.     Skin:     General: Skin is warm and dry.      Capillary Refill: Capillary refill takes less than 2 seconds.     Neurological:      General: No focal deficit present.      Mental Status: He is alert and oriented to person, place, and time.     Psychiatric:         Mood and Affect: Mood normal.           Lines/Drains:        Telemetry:  Telemetry Orders (From admission, onward)               24 Hour Telemetry Monitoring  Continuous x 24 Hours (Telem)        Expiring   Question:  Reason for 24 Hour Telemetry  Answer:  TIA/Suspected CVA/ Confirmed CVA                     Telemetry Reviewed: Normal Sinus Rhythm  Indication for Continued Telemetry Use: Arrthymias requiring medical therapy               Lab Results: I have reviewed the following results:   Results from last 7 days   Lab Units 06/19/25  0511 06/18/25  1922   WBC Thousand/uL 7.20 5.96   HEMOGLOBIN g/dL 12.9 13.3   HEMATOCRIT % 38.4 40.2   PLATELETS Thousands/uL 168 177   SEGS PCT %  --  49   LYMPHO PCT %  --  36   MONO PCT %  --  10   EOS PCT %  --  3     Results from last 7 days   Lab Units 06/19/25  0511 06/18/25  1922   SODIUM mmol/L 138 138   POTASSIUM mmol/L 4.2 4.4   CHLORIDE mmol/L 106 105   CO2 mmol/L 25 27   BUN mg/dL 23 23   CREATININE mg/dL 0.96 0.99   ANION GAP mmol/L 7 6   CALCIUM mg/dL 8.9 9.2   ALBUMIN g/dL  --  4.0   TOTAL BILIRUBIN mg/dL  --  0.49   ALK PHOS U/L  --  47   ALT U/L   --  19   AST U/L  --  21   GLUCOSE RANDOM mg/dL 95 100     Results from last 7 days   Lab Units 06/18/25  1922   INR  1.04         Results from last 7 days   Lab Units 06/19/25  0511   HEMOGLOBIN A1C % 6.1*           Recent Cultures (last 7 days):         Imaging Results Review: I reviewed radiology reports from this admission including: chest xray and CT head and neck.  Other Study Results Review: EKG was reviewed.     Last 24 Hours Medication List:     Current Facility-Administered Medications:     apixaban (ELIQUIS) tablet 5 mg, BID    aspirin chewable tablet 81 mg, Daily    atorvastatin (LIPITOR) tablet 40 mg, QPM    cyanocobalamin (VITAMIN B-12) tablet 1,000 mcg, Daily    gabapentin (NEURONTIN) capsule 100 mg, Daily    gabapentin (NEURONTIN) capsule 300 mg, QPM    gabapentin (NEURONTIN) capsule 300 mg, HS    levothyroxine tablet 125 mcg, Early Morning    [START ON 6/20/2025] metoprolol tartrate (LOPRESSOR) partial tablet 12.5 mg, Q12H SHERRY    pantoprazole (PROTONIX) EC tablet 40 mg, Early Morning    rOPINIRole (REQUIP) tablet 2 mg, BID    Administrative Statements   Today, Patient Was Seen By: Jessenia Frazier MD      **Please Note: This note may have been constructed using a voice recognition system.**

## 2025-06-20 VITALS
HEART RATE: 59 BPM | BODY MASS INDEX: 25.56 KG/M2 | WEIGHT: 182.54 LBS | TEMPERATURE: 97.2 F | OXYGEN SATURATION: 97 % | RESPIRATION RATE: 18 BRPM | DIASTOLIC BLOOD PRESSURE: 71 MMHG | SYSTOLIC BLOOD PRESSURE: 117 MMHG | HEIGHT: 71 IN

## 2025-06-20 LAB
ANION GAP SERPL CALCULATED.3IONS-SCNC: 5 MMOL/L (ref 4–13)
BUN SERPL-MCNC: 22 MG/DL (ref 5–25)
CALCIUM SERPL-MCNC: 9.4 MG/DL (ref 8.4–10.2)
CHLORIDE SERPL-SCNC: 105 MMOL/L (ref 96–108)
CO2 SERPL-SCNC: 28 MMOL/L (ref 21–32)
CORTIS SERPL-MCNC: 3.9 UG/DL
CREAT SERPL-MCNC: 0.91 MG/DL (ref 0.6–1.3)
GFR SERPL CREATININE-BSD FRML MDRD: 76 ML/MIN/1.73SQ M
GLUCOSE SERPL-MCNC: 107 MG/DL (ref 65–140)
POTASSIUM SERPL-SCNC: 4.8 MMOL/L (ref 3.5–5.3)
SODIUM SERPL-SCNC: 138 MMOL/L (ref 135–147)

## 2025-06-20 PROCEDURE — 82533 TOTAL CORTISOL: CPT | Performed by: FAMILY MEDICINE

## 2025-06-20 PROCEDURE — 80048 BASIC METABOLIC PNL TOTAL CA: CPT | Performed by: FAMILY MEDICINE

## 2025-06-20 PROCEDURE — 99239 HOSP IP/OBS DSCHRG MGMT >30: CPT | Performed by: FAMILY MEDICINE

## 2025-06-20 RX ORDER — METOPROLOL SUCCINATE 25 MG/1
25 TABLET, EXTENDED RELEASE ORAL 2 TIMES DAILY
Qty: 60 TABLET | Refills: 0 | Status: SHIPPED | OUTPATIENT
Start: 2025-06-20 | End: 2025-07-20

## 2025-06-20 RX ADMIN — Medication 12.5 MG: at 07:43

## 2025-06-20 RX ADMIN — PANTOPRAZOLE SODIUM 40 MG: 40 TABLET, DELAYED RELEASE ORAL at 07:16

## 2025-06-20 RX ADMIN — ROPINIROLE 2 MG: 1 TABLET, FILM COATED ORAL at 07:16

## 2025-06-20 RX ADMIN — APIXABAN 5 MG: 5 TABLET, FILM COATED ORAL at 07:16

## 2025-06-20 RX ADMIN — ASPIRIN 81 MG: 81 TABLET, CHEWABLE ORAL at 07:16

## 2025-06-20 RX ADMIN — CYANOCOBALAMIN TAB 500 MCG 1000 MCG: 500 TAB at 07:16

## 2025-06-20 RX ADMIN — LEVOTHYROXINE SODIUM 125 MCG: 0.12 TABLET ORAL at 05:09

## 2025-06-20 RX ADMIN — GABAPENTIN 100 MG: 100 CAPSULE ORAL at 07:16

## 2025-06-20 NOTE — ASSESSMENT & PLAN NOTE
Presented from home after episode of bilateral lower extremity numbness and weakness, difficulty ambulating.  Originally seen at outside hospital with recommendations for admission but patient declined and returned home where he had another episode that resolved prior to admission  CTA without infarct/bleed/LVO or stenosis  Admit under stroke protocol.  NIH 0  MRI brain negative for stroke  Low Vitamin B12, normal vitamin D, TSH normal, A1c, lipid panel  Continue PTA Eliquis  Aspirin 81 mg daily pending MRI  Atorvastatin  Telemetry  PT/OT/case management  Formal neurology consultation  Suspect his symptoms are also happening due to hypotension. Need to check orthostatic vitals.decrease metoprolol dose .  The patient's blood pressure is improving and no orthostasis noted today

## 2025-06-20 NOTE — PLAN OF CARE
Problem: PAIN - ADULT  Goal: Verbalizes/displays adequate comfort level or baseline comfort level  Description: Interventions:  - Encourage patient to monitor pain and request assistance  - Assess pain using appropriate pain scale  - Administer analgesics as ordered based on type and severity of pain and evaluate response  - Implement non-pharmacological measures as appropriate and evaluate response  - Consider cultural and social influences on pain and pain management  - Notify physician/advanced practitioner if interventions unsuccessful or patient reports new pain  - Educate patient/family on pain management process including their role and importance of  reporting pain   - Provide non-pharmacologic/complimentary pain relief interventions  Outcome: Progressing     Problem: INFECTION - ADULT  Goal: Absence or prevention of progression during hospitalization  Description: INTERVENTIONS:  - Assess and monitor for signs and symptoms of infection  - Monitor lab/diagnostic results  - Monitor all insertion sites, i.e. indwelling lines, tubes, and drains  - Monitor endotracheal if appropriate and nasal secretions for changes in amount and color  - Hope appropriate cooling/warming therapies per order  - Administer medications as ordered  - Instruct and encourage patient and family to use good hand hygiene technique  - Identify and instruct in appropriate isolation precautions for identified infection/condition  Outcome: Progressing  Goal: Absence of fever/infection during neutropenic period  Description: INTERVENTIONS:  - Monitor WBC  - Perform strict hand hygiene  - Limit to healthy visitors only  - No plants, dried, fresh or silk flowers with morales in patient room  Outcome: Progressing     Problem: SAFETY ADULT  Goal: Patient will remain free of falls  Description: INTERVENTIONS:  - Educate patient/family on patient safety including physical limitations  - Instruct patient to call for assistance with activity   -  Consider consulting OT/PT to assist with strengthening/mobility based on AM PAC & JH-HLM score  - Consult OT/PT to assist with strengthening/mobility   - Keep Call bell within reach  - Keep bed low and locked with side rails adjusted as appropriate  - Keep care items and personal belongings within reach  - Initiate and maintain comfort rounds  - Make Fall Risk Sign visible to staff  - Offer Toileting every 2 Hours, in advance of need  - Initiate/Maintain bed alarm  - Obtain necessary fall risk management equipment walker   - Apply yellow socks and bracelet for high fall risk patients  - Consider moving patient to room near nurses station  Outcome: Progressing  Goal: Maintain or return to baseline ADL function  Description: INTERVENTIONS:  -  Assess patient's ability to carry out ADLs; assess patient's baseline for ADL function and identify physical deficits which impact ability to perform ADLs (bathing, care of mouth/teeth, toileting, grooming, dressing, etc.)  - Assess/evaluate cause of self-care deficits   - Assess range of motion  - Assess patient's mobility; develop plan if impaired  - Assess patient's need for assistive devices and provide as appropriate  - Encourage maximum independence but intervene and supervise when necessary  - Involve family in performance of ADLs  - Assess for home care needs following discharge   - Consider OT consult to assist with ADL evaluation and planning for discharge  - Provide patient education as appropriate  - Monitor functional capacity and physical performance, use of AM PAC & JH-HLM   - Monitor gait, balance and fatigue with ambulation    Outcome: Progressing  Goal: Maintains/Returns to pre admission functional level  Description: INTERVENTIONS:  - Perform AM-PAC 6 Click Basic Mobility/ Daily Activity assessment daily.  - Set and communicate daily mobility goal to care team and patient/family/caregiver.   - Collaborate with rehabilitation services on mobility goals if  consulted  - Perform Range of Motion 3 times a day.  - Reposition patient every 2 hours.  - Dangle patient 3 times a day  - Stand patient 3 times a day  - Ambulate patient 3 times a day  - Out of bed to chair 3 times a day   - Out of bed for meals 3 times a day  - Out of bed for toileting  - Record patient progress and toleration of activity level   Outcome: Progressing     Problem: DISCHARGE PLANNING  Goal: Discharge to home or other facility with appropriate resources  Description: INTERVENTIONS:  - Identify barriers to discharge w/patient and caregiver  - Arrange for needed discharge resources and transportation as appropriate  - Identify discharge learning needs (meds, wound care, etc.)  - Arrange for interpretive services to assist at discharge as needed  - Refer to Case Management Department for coordinating discharge planning if the patient needs post-hospital services based on physician/advanced practitioner order or complex needs related to functional status, cognitive ability, or social support system  Outcome: Progressing     Problem: Knowledge Deficit  Goal: Patient/family/caregiver demonstrates understanding of disease process, treatment plan, medications, and discharge instructions  Description: Complete learning assessment and assess knowledge base.  Interventions:  - Provide teaching at level of understanding  - Provide teaching via preferred learning methods  Outcome: Progressing

## 2025-06-20 NOTE — CONSULTS
"Consult received for stroke pathway. Pt reports good appetite currently and PTA. Reports he \"knows what I should be eating\" says for the most part follows a healthy diet. No significant changes per chart review of weight hx.     Reviewed lipid panel and A1c levels with pt. Discussed limiting added sugars such as from beverages/desserts in his diet. Discussed foods rich in vitamin B12, provided with High Vitamin B12 Foods List. Pt had no questions or concerns at this time. Continue cardiac diet as ordered.   "

## 2025-06-20 NOTE — NURSING NOTE
MRI surescan turned on via [employer] memo for MRI of brain. Detection therapies turned off. Mode changed to DOO 85 bpm. Patient telemetry monitored during MRI, no rhythm issues.  After MRI completed, [employer] pacer turned back to original settings. Detections turned back on.  No parameters changed via [employer] memo.  Patient tolerated well.

## 2025-06-20 NOTE — DISCHARGE SUMMARY
Discharge Summary - Hospitalist   Name: Madhu Bach 84 y.o. male I MRN: [id number]  Unit/Bed#: -01 I Date of Admission: 6/18/2025   Date of Service: 6/20/2025 I Hospital Day: 1     Assessment & Plan  Stroke-like symptom  Presented from home after episode of bilateral lower extremity numbness and weakness, difficulty ambulating.  Originally seen at outside hospital with recommendations for admission but patient declined and returned home where he had another episode that resolved prior to admission  CTA without infarct/bleed/LVO or stenosis  Admit under stroke protocol.  NIH 0  MRI brain negative for stroke  Low Vitamin B12, normal vitamin D, TSH normal, A1c, lipid panel  Continue PTA Eliquis  Aspirin 81 mg daily pending MRI  Atorvastatin  Telemetry  PT/OT/case management  Formal neurology consultation  Suspect his symptoms are also happening due to hypotension. Need to check orthostatic vitals.decrease metoprolol dose .  The patient's blood pressure is improving and no orthostasis noted today  Other specified hypothyroidism  Continue PTA levothyroxine  TSH 3.588  Dilated cardiomyopathy (HCC)  Preserved EF 40% March 2025  hold PTA metoprolol succinate due to hypotension and placed on metoprolol tartarate 12.5 mg bid  No ACE/ARB due to history of hypotension  Euvolemic without diuretics  Monitor volume status  Paroxysmal atrial fibrillation (HCC)  Continue metoprolol twice daily dose decreased due to hypotension and anticoagulation with Eliquis  PPM in place  Orthostatic hypotension  Patient is an active individual actively bicycles and drives in the community.  The last few days he has been noticing some dizziness and lightheadedness.  Yesterday he got admitted because of generalized weakness of both legs.  Today after his occupational therapy session initially his orthostatic vitals were normal but then a few minutes later his blood pressure dropped to 70s systolics got dizzy lightheaded diaphoretic and he  was laid down and a little while later his blood pressure recovered.  normal cortisol level. decrease metoprolol dose continue to monitor on telemetry and orthostatics are normal today and no symptoms reported today  place MILDRED stockings     Medical Problems       Resolved Problems  Date Reviewed: 6/19/2025   None       Discharging Physician / Practitioner: Jessenia Frazier MD  PCP: Alex Veras MD  Admission Date:   Admission Orders (From admission, onward)       Ordered        06/19/25 1245  INPATIENT ADMISSION  Once            06/18/25 1924  Place in Observation  Once                          Discharge Date: 06/20/25    Next Steps for Physician/AP Assuming Care:  Outpatient follow-up with cardiology for blood pressure and pacemaker check    Test Results Pending at Discharge (will require follow up):  None    Medication Changes for Discharge & Rationale:   See after visit summary for reconciled discharge medications provided to patient and/or family.     Consultations During Hospital Stay:  Neuro    Procedures Performed:   none    Significant Findings / Test Results:   MRI brain wo contrast  Result Date: 6/19/2025  Impression: White matter changes suggestive of chronic microangiopathy. No acute intracranial pathology. Workstation performed: [id number]     CTA head and neck with and without contrast  Result Date: 6/18/2025  Impression: CT brain: No acute intracranial abnormality. Mild chronic microangiopathic ischemic changes. CTA head: Negative for large vessel intracranial occlusion. Moderate stenosis along the bilateral intracranial vertebral arteries. CTA neck: Moderate right extracranial ICA stenosis. There is no hemodynamically significant left extracranial carotid stenosis. The cervical vertebral arteries are patent. Workstation performed: [id number]     XR chest 1 view portable  Result Date: 6/18/2025  Impression: No acute cardiopulmonary disease. Workstation performed: [id number]      Incidental Findings:  "      Hospital Course:   Madhu Bach is a 84 y.o. male patient who originally presented to the hospital on 6/18/2025 due to bilateral lower extremity weakness and dizziness.  Initially stroke workup was initiated however MRI brain was negative for stroke and his symptoms are secondary to very significant orthostatic hypotension.  He normally takes Toprol-XL 50 mg twice daily.  His beta-blocker dose was decreased and he was given MILDRED stockings following which his symptoms improved and he is able to ambulate with no dizziness reported.  Will discharge him on Toprol-XL 25 mg twice daily and recommended outpatient follow-up with cardiology for routine follow-up and pacemaker check now that his Toprol-XL dose has been decreased as he did have previous history of PVCs so far no PVCs noted on monitor here and heart rates have been in the 60s.      Please see above list of diagnoses and related plan for additional information.     Discharge Day Visit / Exam:   Subjective: Patient denies any chest pain shortness of breath dizziness or weakness today  Vitals: Blood Pressure: 117/71 (06/20/25 1100)  Pulse: 59 (06/20/25 1100)  Temperature: (!) 97.2 °F (36.2 °C) (06/20/25 1045)  Temp Source: Temporal (06/20/25 1045)  Respirations: 18 (06/20/25 1045)  Height: 5' 11\" (180.3 cm) (06/18/25 1949)  Weight - Scale: 82.8 kg (182 lb 8.7 oz) (06/18/25 1949)  SpO2: 97 % (06/20/25 1100)  Physical Exam  Vitals and nursing note reviewed.   Constitutional:       Appearance: Normal appearance.   HENT:      Head: Normocephalic and atraumatic.      Right Ear: External ear normal.      Left Ear: External ear normal.      Nose: Nose normal.      Mouth/Throat:      Pharynx: Oropharynx is clear.     Eyes:      Pupils: Pupils are equal, round, and reactive to light.       Cardiovascular:      Rate and Rhythm: Normal rate and regular rhythm.      Heart sounds: Normal heart sounds.   Pulmonary:      Effort: Pulmonary effort is normal.      Breath " sounds: Normal breath sounds.   Abdominal:      General: Bowel sounds are normal.      Palpations: Abdomen is soft.      Tenderness: There is no abdominal tenderness.     Musculoskeletal:         General: Normal range of motion.      Cervical back: Normal range of motion and neck supple.     Skin:     General: Skin is warm and dry.      Capillary Refill: Capillary refill takes less than 2 seconds.     Neurological:      General: No focal deficit present.      Mental Status: He is alert and oriented to person, place, and time.     Psychiatric:         Mood and Affect: Mood normal.            Discussion with Family: Updated  (wife) at bedside.    Discharge instructions/Information to patient and family:   See after visit summary for information provided to patient and family.      Provisions for Follow-Up Care:  See after visit summary for information related to follow-up care and any pertinent home health orders.      Mobility at time of Discharge:   Basic Mobility Inpatient Raw Score: 23  JH-HLM Goal: 7: Walk 25 feet or more  JH-HLM Achieved: 8: Walk 250 feet ot more  HLM Goal achieved. Continue to encourage appropriate mobility.     Disposition:   Home    Planned Readmission: none    Administrative Statements   Discharge Statement:  I have spent a total time of 35 minutes in caring for this patient on the day of the visit/encounter. .    **Please Note: This note may have been constructed using a voice recognition system**

## 2025-06-20 NOTE — CASE MANAGEMENT
Case Management Discharge Planning Note    Patient name Madhu Bach  Location /-01 MRN [id number]  : 1940 Date 2025       Current Admission Date: 2025  Current Admission Diagnosis:Stroke-like symptom   Patient Active Problem List    Diagnosis Date Noted    Orthostatic hypotension 2025    Stroke-like symptom 2025    Paroxysmal atrial fibrillation (HCC) 2021    History of venous thromboembolism 2021    Other specified hypothyroidism 2021    Hyperlipidemia     Dilated aortic root (HCC)     Dilated cardiomyopathy (HCC)     History of tachycardia-bradycardia syndrome 2020    MR (mitral regurgitation) 2020    Normocytic anemia 2020    Closed fracture of multiple ribs of left side with routine healing 2020    Bladder tumor 2020    Non-seasonal allergic rhinitis 2020    BPH (benign prostatic hyperplasia) 2019    Gastroesophageal reflux disease 10/28/2019    Presence of cardiac pacemaker 2019      LOS (days): 1  Geometric Mean LOS (GMLOS) (days): 2.3  Days to GMLOS:1.3     OBJECTIVE:  Risk of Unplanned Readmission Score: 12.09         Current admission status: Inpatient   Preferred Pharmacy:   [facility]/pharm[facility] #[id number] - [place], [place] - [address] [place] [id number]  Phone: [phone] Fax: [phone]    [facility] - [place], [place] - [facility] [address]  Phone: [phone] Fax: [phone]    Primary Care Provider: Alex Veras MD    Primary Insurance: MEDICARE  Secondary Insurance: BLUE CROSS    DISCHARGE DETAILS:       Case was discussed in multi disciplinary rounds. Attending the rounds were the provider, Nursing, Care Management, RT and therapy (OT).   Plan is MRI -   DC recommendation is OP therapy, anticipate DC home today  CM will continue to follow.

## 2025-06-23 ENCOUNTER — IN-CLINIC DEVICE VISIT (OUTPATIENT)
Dept: CARDIOLOGY CLINIC | Facility: CLINIC | Age: 85
End: 2025-06-23
Payer: MEDICARE

## 2025-06-23 DIAGNOSIS — R00.1 BRADYCARDIA: ICD-10-CM

## 2025-06-23 DIAGNOSIS — I49.5 SSS (SICK SINUS SYNDROME) (HCC): Primary | ICD-10-CM

## 2025-06-23 PROCEDURE — 93280 PM DEVICE PROGR EVAL DUAL: CPT | Performed by: INTERNAL MEDICINE

## 2025-06-23 NOTE — PROGRESS NOTES
Results for orders placed or performed in visit on 06/23/25   Cardiac EP device report    Narrative    MDT D-PM/ACTIVE SYSTEM IS MRI CONDITIONAL  DEVICE INTERROGATED IN THE [place] OFFICE: BATTERY VOLTAGE ADEQUATE (8.6 YRS). AP: 86.2%. : 12.4% (MVP-ON). ALL LEAD PARAMETERS WITHIN NORMAL LIMITS. NO SIGNIFICANT HIGH RATE EPISODES. NO PROGRAMMING CHANGES MADE TO DEVICE PARAMETERS. NORMAL DEVICE FUNCTION. CH

## 2025-06-26 ENCOUNTER — OFFICE VISIT (OUTPATIENT)
Dept: CARDIOLOGY CLINIC | Facility: CLINIC | Age: 85
End: 2025-06-26
Payer: MEDICARE

## 2025-06-26 VITALS
OXYGEN SATURATION: 96 % | BODY MASS INDEX: 25.62 KG/M2 | HEART RATE: 60 BPM | SYSTOLIC BLOOD PRESSURE: 112 MMHG | HEIGHT: 71 IN | TEMPERATURE: 97.6 F | WEIGHT: 183 LBS | DIASTOLIC BLOOD PRESSURE: 60 MMHG

## 2025-06-26 DIAGNOSIS — I34.0 NONRHEUMATIC MITRAL VALVE REGURGITATION: ICD-10-CM

## 2025-06-26 DIAGNOSIS — Z86.79 HISTORY OF TACHYCARDIA-BRADYCARDIA SYNDROME: ICD-10-CM

## 2025-06-26 DIAGNOSIS — E78.2 MIXED HYPERLIPIDEMIA: ICD-10-CM

## 2025-06-26 DIAGNOSIS — I77.810 DILATED AORTIC ROOT (HCC): ICD-10-CM

## 2025-06-26 DIAGNOSIS — Z95.0 PRESENCE OF CARDIAC PACEMAKER: ICD-10-CM

## 2025-06-26 DIAGNOSIS — I42.0 DILATED CARDIOMYOPATHY (HCC): Primary | ICD-10-CM

## 2025-06-26 DIAGNOSIS — I95.1 ORTHOSTATIC HYPOTENSION: ICD-10-CM

## 2025-06-26 DIAGNOSIS — I48.0 PAROXYSMAL ATRIAL FIBRILLATION (HCC): ICD-10-CM

## 2025-06-26 PROBLEM — R29.90 STROKE-LIKE SYMPTOM: Status: RESOLVED | Noted: 2025-06-18 | Resolved: 2025-06-26

## 2025-06-26 PROCEDURE — 99214 OFFICE O/P EST MOD 30 MIN: CPT | Performed by: NURSE PRACTITIONER

## 2025-06-26 NOTE — ASSESSMENT & PLAN NOTE
Pacemaker device interrogation on 9/8/2021 reveals 11 episodes of atrial fibrillation, longest episode lasting 3 hours and 58 minutes, which was a new finding.  Patient was already on Eliquis 5mg BID for DVT treatment.   Advised that we will continue this anticoagulation long-term for stroke prevention.  Will continue to monitor AF burden on device checks. No AF on device check this month.

## 2025-06-26 NOTE — PROGRESS NOTES
Cardiology Follow Up    Madhu Bach  1940  [id number]  [facility] [place] [address]  [address]LO[address]  [facility] [address] [phone]    Jai presents for close follow up from his hospitalization with weakness and hypotension.    1. Dilated cardiomyopathy (HCC)  Assessment & Plan:  Patient has a history of reduced LVEF.  Stage 1A heart failure.  He had an abnormal stress test in 2009 and underwent cardiac cath at that time that revealed no obvious coronary disease.  Holter monitor in 2019 revealed occasional PAC's and rare PVC's  Echo 2/4/2024 shows LVEF 50-55%.  Echo 3/2025 with EF around 40%. Frequent PVC's noted on PPM interrogation.  Metoprolol succinate increased to 50 mg BID but did not tolerate due to lightheadedness. Continue 25 mg BID dosing.  He has not been started on ACE/ARB secondary to history of hypotension and episodic lightheadedness.  He appears euvolemic without diuretics.  No anginal complaints. Will monitor closely.  Update limited echo to monitor function now given new weakness.  Orders:  -     Echo; Future; Expected date: 09/26/2025  2. Nonrheumatic mitral valve regurgitation  Assessment & Plan:  Mild to moderate MR noted on HUSSEIN in 6/2020  Echocardiogram 2/2/2023 shows moderate MR.  Remains moderate on 3/2025 echo  He currently denies shortness of breath and does not exhibit volume overload.  Reviewed urgent s/s to report.  3. Dilated aortic root (HCC)  Assessment & Plan:  Aortic root has measured 3.8-4cm on prior imaging  3.6 cm aortic root noted on both 2024 and 2025 echocardiograms.  4. Paroxysmal atrial fibrillation (HCC)  Assessment & Plan:  Pacemaker device interrogation on 9/8/2021 reveals 11 episodes of atrial fibrillation, longest episode lasting 3 hours and 58 minutes, which was a new finding.  Patient was already on Eliquis 5mg BID for DVT treatment.   Advised that we will continue this anticoagulation long-term for  stroke prevention.  Will continue to monitor AF burden on device checks. No AF on device check this month.  5. History of tachycardia-bradycardia syndrome  Assessment & Plan:  History of sick sinus syndrome resulting in pacemaker insertion in June of 2009.  [facility] took over device management.  Device interrogation on 10/14/2021 revealed device at [facility].  Pt underwent device generator replacement by Dr. Hyde on 10/21/21.   6. Presence of cardiac pacemaker  Assessment & Plan:  See discussion under tachy-cinda syndrome  [facility] Device clinic managing  7. Orthostatic hypotension  Assessment & Plan:  Secondary to medication use  Orthostatic check today is negative  Continue metoprolol succinate 25 mg BID  Monitor BP at home and report readings < 100/60  8. Mixed hyperlipidemia  Assessment & Plan:  Lipid panel 8/21/2024: C 150. T 125. H 46. L 79.  Pt remains on simvastatin 40 mg daily  Goal LDL < 100     HPI  Madhu has a past medical history of sick sinus syndrome requiring PPM in 6/2009, non-ischemic dilated cardiomyopathy, PAF, frequent PVC's,  fall with rib fracture and resulting hemothorax in 10/2020, and subsequent DVT on Eliquis.     He was initially following with Dr. Morfin at [facility]. He transferred to [facility] in spring 2021.     He then transitioned to [facility] and met with Dr. Calderón on 8/27/2021 for consultation. His pacemaker device management was transitioned to [facility] at that time.      He followed up with me on 9/16/2021 after evaluation at [facility] for left sided chest pain. Cardiac work up was unrevealing. He denied any recurrent chest pain at follow up. At that time his device interrogation revealed 4 episodes of NSVT and 11 episodes of atrial fibrillation with the longest episode lasting 3 hrs and 58 minutes. Metoprolol succinate was initiated. He was advised that his Eliquis will continue long-term for stroke prevention. He was asymptomatic with the  episodes.     His device interrogation on 10/14/2021 revealed his pacemaker was at [facility]. He met with Dr. Hyde for consultation on that date. He underwent pacemaker generator replacement on 10/21/2021 at [facility] by Dr. Hyde.     Echo and stress test were ordered in 2/2023 due to NSVT on his device interrogation. Metoprolol was increased to 50 mg daily.   Echocardiogram 2/6/2023 showed LVEF 50-55% with mild-mod LVH, mod MR.  Exercise nuclear stress test 3/21/2023 showed no perfused defects with Gated EF 40%.     He followed up most recently 4/42025. ECG showed atrial pacing with one PVC. Device interrogation on 3/30 showed an increase in his PVC burden to 177/hour. He was asymptomatic. He had recently been started on Requip and gabapentin by Dr. Zapata for restless leg. Echo 3/2025 showed LVEF 35-40% with mod MR. Metoprolol succinate was increased to 50 mg BID.     6/26/2025: Jai presents for close follow up. He was admitted to [facility] 6/18-6/20/2025 when he presented with an episode of profound leg weakness. 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 Stroke work up was unremarkable. Lab work showed negative HS trop x 3, stable blood count and renal function, mag 1.8, K 4.5, TSH 3.5, cortisol WNL, CK WNL. He was noted to have orthostatic hypotension. His metoprolol was initially held but resumed at 25 mg BID prior to discharge. He is accompanied by his wife today. He remains remarkably active. 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 He adamantly denies any chest discomfort, shortness of breath. He notes improvement in symptoms but continues with some positional lightheadedness. He has started wearing compression socks and is trying to hydrate better. He did reach out to Dr. Zapata to question if the gabapentin could be contributing as his dose was recently doubled, but Dr. Zapata says no.     Medical Problems       Problem  List       Non-seasonal allergic rhinitis    Closed fracture of multiple ribs of left side with routine healing    Bladder tumor    Normocytic anemia    History of venous thromboembolism    Other specified hypothyroidism    BPH (benign prostatic hyperplasia)    Gastroesophageal reflux disease    Stroke-like symptom    Orthostatic hypotension    Dilated cardiomyopathy (HCC)    Paroxysmal atrial fibrillation (HCC)    MR (mitral regurgitation)    Dilated aortic root (HCC)    History of tachycardia-bradycardia syndrome    Presence of cardiac pacemaker    Hyperlipidemia        Past Medical History[1]  Social History     Socioeconomic History    Marital status: /Civil Union     Spouse name: Not on file    Number of children: Not on file    Years of education: Not on file    Highest education level: Not on file   Occupational History    Not on file   Tobacco Use    Smoking status: Never     Passive exposure: Never    Smokeless tobacco: Never   Vaping Use    Vaping status: Never Used   Substance and Sexual Activity    Alcohol use: Not Currently    Drug use: Not Currently    Sexual activity: Not Currently   Other Topics Concern    Not on file   Social History Narrative    Not on file     Social Drivers of Health     Financial Resource Strain: Not on file   Food Insecurity: Patient Declined (6/19/2025)    Nursing - Inadequate Food Risk Classification     Worried About Running Out of Food in the Last Year: Not on file     Ran Out of Food in the Last Year: Not on file     Ran Out of Food in the Last Year: Patient declined   Transportation Needs: Patient Declined (6/19/2025)    Nursing - Transportation Risk Classification     Lack of Transportation: Not on file     Lack of Transportation: Patient declined   Physical Activity: Not on file   Stress: Not on file   Social Connections: Unknown (6/18/2024)    Received from [facility]    Social Connections     How often do you feel lonely or isolated from those around you? (Adult  - for ages 18 years and over): Not on file   Intimate Partner Violence: Patient Declined (6/19/2025)    Nursing IPS     Feels Physically and Emotionally Safe: Not on file     Physically Hurt by Someone: Not on file     Humiliated or Emotionally Abused by Someone: Not on file     Physically Hurt by Someone: Patient declined     Hurt or Threatened by Someone: Patient declined   Housing Stability: Patient Declined (6/19/2025)    Nursing: Inadequate Housing Risk Classification     Has Housing: Not on file     Worried About Losing Housing: Not on file     Unable to Get Utilities: Not on file     Unable to Pay for Housing in the Last Year: Patient declined     Has Housing: Patient declined      Family History[2]  Past Surgical History[3]  Current Medications[4]  Allergies   Allergen Reactions    Penicillins Anaphylaxis and Hives       Labs:     Chemistry        Component Value Date/Time    K 4.8 06/20/2025 0501    K 4.1 09/25/2024 0614     06/20/2025 0501     09/09/2024 1115    CO2 28 06/20/2025 0501    CO2 29 09/09/2024 1115    BUN 22 06/20/2025 0501    BUN 19 09/09/2024 1115    CREATININE 0.91 06/20/2025 0501    CREATININE 0.88 09/09/2024 1115        Component Value Date/Time    CALCIUM 9.4 06/20/2025 0501    CALCIUM 9.4 09/09/2024 1115    ALKPHOS 47 06/18/2025 1922    ALKPHOS 49 09/09/2024 1115    AST 21 06/18/2025 1922    AST 23 09/09/2024 1115    ALT 19 06/18/2025 1922    ALT 23 09/09/2024 1115        Lipid panel 6/19/2025: C 162. T 126. H 43. L 94.     Cardiac testing:   Cardiac EP device report 6/23/2025  MDT D-PM/ACTIVE SYSTEM IS MRI CONDITIONAL DEVICE INTERROGATED IN THE [place] OFFICE: BATTERY VOLTAGE ADEQUATE (8.6 YRS). AP: 86.2%. : 12.4% (MVP-ON). ALL LEAD PARAMETERS WITHIN NORMAL LIMITS. NO SIGNIFICANT HIGH RATE EPISODES. NO PROGRAMMING CHANGES MADE TO DEVICE PARAMETERS. NORMAL DEVICE FUNCTION.      ECG 4/4/2025: Atrial paced rhythm with prolonged AV conduction. One PVC. Rate 79 bpm.     Echo  3/3/2025:  LVEF reported at 37%. Visually appears to be closer to 40-45%.  Mod MR.  Aortic root 3.6 cm. Ascending aorta 3.4 cm.      Echo complete 2/5/2024  LVEF 50-54%. Mild LVH. Grade 1 DD.  Mod MR. Mild TR. PASP 36mmHg.  Aortic root 3.6 cm.     ECG 11/28/2023: Ventricular paced rhythm. Rate 66 bpm.     Exercise nuclear stress test 3/21/2023:  Billy protocol. Achieved 9.3 METs and 80% MPHR. No perfusion defects. Gated EF 40%.     Echocardiogram 2/6/2023:  EF 50-55%. Mild-mod LVH. Mild diastolic dysfunction.  RV enlarged with normal function.  Trace AI. Mod MR. Mild TR with PASP 30 mmHg.  Aortic root 3.9 cm, ascending aorta 3.2 cm.     ECG 10/29/2021: atrial paced rhythm with prolonged AV conduction with PAC, right bundle branch block, left posterior fascicular block, T wave abnormality, rate 69     Exercise nuclear stress test 9/3/2021:  Duration of exercise was 7 min and 0 sec. Target heart rate was achieved. There was no chest pain during stress. -  Gated SPECT: The calculated left ventricular ejection fraction was 45 %. There was mild global left ventricular hypokinesis. IMPRESSIONS: No evidence of ischemia/myocardium at risk. Inferior wall perfusion abnormality noted which may represent attenuation artifact given reasonable wall thickening in this segment. Low risk perfusion findings. Mildly reduced LV function suggested on gated analysis.       Echocardiogram 8/6/2021:  EF 50% Grade 1 diastolic dysfunction. Mild to moderate MR. Mild TR. Trace RI. Aortic root 3.5cm, ascending aorta 3.6cm. Compared to prior study 5/27/2020 the mitral regurgitation was previously reported to be possibly severe but appears at most moderate on this study. No other significant changes.     HUSSEIN 6/17/2020:  EF 40-45%. Left atrium mildly dilated. Mild to moderate MR.    Review of Systems   Constitutional: Negative.   HENT: Negative.     Cardiovascular:  Negative for chest pain, dyspnea on exertion, irregular heartbeat, leg swelling,  "near-syncope, orthopnea and palpitations.   Respiratory:  Negative for cough and snoring.    Endocrine: Negative.    Skin: Negative.    Musculoskeletal: Negative.    Gastrointestinal: Negative.    Genitourinary: Negative.    Neurological:  Positive for dizziness, light-headedness and paresthesias (restless leg, improved with gabapentin).   Psychiatric/Behavioral: Negative.         Vitals:    06/26/25 0904   BP: 112/60   Pulse: 60   Temp:    SpO2:      Vitals:    06/26/25 0836   Weight: 83 kg (183 lb)     Height: 5' 11\" (180.3 cm)   Body mass index is 25.52 kg/m².    Physical Exam  Vitals and nursing note reviewed.   Constitutional:       General: He is not in acute distress.     Appearance: He is well-developed. He is not diaphoretic.   HENT:      Head: Normocephalic and atraumatic.   Neck:      Vascular: No carotid bruit or JVD.     Cardiovascular:      Rate and Rhythm: Normal rate and regular rhythm. Occasional Extrasystoles are present.     Pulses: Intact distal pulses.      Heart sounds: Normal heart sounds, S1 normal and S2 normal. No murmur heard.     No friction rub. No gallop.      Comments: No edema  Pulmonary:      Effort: Pulmonary effort is normal. No respiratory distress.      Breath sounds: Normal breath sounds.   Abdominal:      General: There is no distension.      Palpations: Abdomen is soft.      Tenderness: There is no abdominal tenderness.     Skin:     General: Skin is warm and dry.      Findings: No rash.     Neurological:      Mental Status: He is alert and oriented to person, place, and time.     Psychiatric:         Behavior: Behavior normal.                     [1]   Past Medical History:  Diagnosis Date    Bladder cancer (HCC)     Dilated aortic root (HCC)     Dilated cardiomyopathy (HCC)     DVT (deep venous thrombosis) (HCC)     GERD (gastroesophageal reflux disease)     Hyperlipidemia     Mitral regurgitation     Pacemaker     Pleural effusion     Rib fractures     SSS (sick sinus " syndrome) (HCC)    [2]   Family History  Problem Relation Name Age of Onset    Lung cancer Mother      Colon cancer Father      COPD Sister     [3]   Past Surgical History:  Procedure Laterality Date    BLADDER TUMOR EXCISION      CARDIAC ELECTROPHYSIOLOGY PROCEDURE N/A 10/21/2021    PPM generator change - dual. Surgeon: Edy Hyde MD    CARDIAC PACEMAKER PLACEMENT  06/09/2009 2009, generator replacement 10/2021    HERNIA REPAIR      X2    THORACOSCOPY VIDEO ASSISTED SURGERY (VATS) Left 11/21/2020    THORACOSCOPY VIDEO ASSISTED SURGERY (VATS), washout of hemothorax, decortication. Surgeon: Alex Eduardo MD;  Location: [facility];  Service: Thoracic    TONSILLECTOMY     [4]   Current Outpatient Medications:     Cetirizine HCl (ZyrTEC Allergy) 10 MG CAPS, Take 10 mg by mouth in the morning, Disp: , Rfl:     Cholecalciferol 50 MCG (2000 UT) CAPS, Take by mouth, Disp: , Rfl:     Coenzyme Q10 (CO Q 10 PO), Take by mouth, Disp: , Rfl:     Cyanocobalamin (VITAMIN B 12 PO), Take by mouth, Disp: , Rfl:     Eliquis 5 MG, Take 1 tablet (5 mg total) by mouth 2 (two) times a day, Disp: 180 tablet, Rfl: 3    gabapentin (NEURONTIN) 100 mg capsule, Take 100 mg by mouth in the morning and 100 mg in the evening and 100 mg before bedtime., Disp: , Rfl:     levothyroxine 25 mcg tablet, , Disp: , Rfl:     Magnesium 250 MG CAPS, Take 250 mg by mouth in the morning, Disp: , Rfl:     metoprolol succinate (TOPROL-XL) 25 mg 24 hr tablet, Take 1 tablet (25 mg total) by mouth in the morning and 1 tablet (25 mg total) before bedtime., Disp: 60 tablet, Rfl: 0    ondansetron (ZOFRAN) 4 mg tablet, Take 4 mg by mouth every 8 (eight) hours as needed, Disp: , Rfl:     pantoprazole (PROTONIX) 40 mg tablet, Take 40 mg by mouth in the morning., Disp: , Rfl:     rOPINIRole (REQUIP) 2 mg tablet, Take 2 mg by mouth in the morning and 2 mg before bedtime., Disp: , Rfl:     simvastatin (ZOCOR) 40 mg tablet, Take 40 mg by mouth daily at  bedtime, Disp: , Rfl:

## 2025-06-26 NOTE — PATIENT INSTRUCTIONS
Continue current medications. Continue hydration, compression socks.  OK for indoor bike rides until I see you again.  Limited echo to recheck your heart function.  Contact me if BP is dropping < 100/60   Magnesium 250 mg at bedtime

## 2025-06-26 NOTE — ASSESSMENT & PLAN NOTE
See discussion under tachy-cinda syndrome  [facility]'s Device clinic managing

## 2025-06-26 NOTE — ASSESSMENT & PLAN NOTE
Secondary to medication use  Orthostatic check today is negative  Continue metoprolol succinate 25 mg BID  Monitor BP at home and report readings < 100/60

## 2025-06-26 NOTE — ASSESSMENT & PLAN NOTE
Patient has a history of reduced LVEF.  Stage 1A heart failure.  He had an abnormal stress test in 2009 and underwent cardiac cath at that time that revealed no obvious coronary disease.  Holter monitor in 2019 revealed occasional PAC's and rare PVC's  Echo 2/4/2024 shows LVEF 50-55%.  Echo 3/2025 with EF around 40%. Frequent PVC's noted on PPM interrogation.  Metoprolol succinate increased to 50 mg BID but did not tolerate due to lightheadedness. Continue 25 mg BID dosing.  He has not been started on ACE/ARB secondary to history of hypotension and episodic lightheadedness.  He appears euvolemic without diuretics.  No anginal complaints. Will monitor closely.  Update limited echo to monitor function now given new weakness.

## 2025-07-03 ENCOUNTER — RESULTS FOLLOW-UP (OUTPATIENT)
Dept: NON INVASIVE DIAGNOSTICS | Facility: HOSPITAL | Age: 85
End: 2025-07-03

## 2025-07-03 ENCOUNTER — HOSPITAL ENCOUNTER (OUTPATIENT)
Dept: NON INVASIVE DIAGNOSTICS | Facility: HOSPITAL | Age: 85
Discharge: HOME | End: 2025-07-03
Attending: NURSE PRACTITIONER
Payer: MEDICARE

## 2025-07-03 VITALS
HEIGHT: 71 IN | BODY MASS INDEX: 25.62 KG/M2 | HEART RATE: 70 BPM | DIASTOLIC BLOOD PRESSURE: 60 MMHG | SYSTOLIC BLOOD PRESSURE: 110 MMHG | WEIGHT: 183 LBS

## 2025-07-03 DIAGNOSIS — I42.0 DILATED CARDIOMYOPATHY (HCC): ICD-10-CM

## 2025-07-03 LAB
AORTIC ROOT: 3.2 CM
BSA FOR ECHO PROCEDURE: 2.03 M2
FRACTIONAL SHORTENING: 37 (ref 28–44)
INTERVENTRICULAR SEPTUM IN DIASTOLE (PARASTERNAL SHORT AXIS VIEW): 1.4 CM
INTERVENTRICULAR SEPTUM: 1.4 CM (ref 0.6–1.1)
LEFT ATRIUM SIZE: 4.9 CM
LEFT INTERNAL DIMENSION IN SYSTOLE: 3.3 CM (ref 2.1–4)
LEFT VENTRICLE DIASTOLIC VOLUME (MOD BIPLANE): 99 ML
LEFT VENTRICLE DIASTOLIC VOLUME INDEX (MOD BIPLANE): 48.8 ML/M2
LEFT VENTRICLE SYSTOLIC VOLUME (MOD BIPLANE): 46 ML
LEFT VENTRICLE SYSTOLIC VOLUME INDEX (MOD BIPLANE): 22.7 ML/M2
LEFT VENTRICULAR INTERNAL DIMENSION IN DIASTOLE: 5.2 CM (ref 3.5–6)
LEFT VENTRICULAR POSTERIOR WALL IN END DIASTOLE: 1.2 CM
LEFT VENTRICULAR STROKE VOLUME: 82 ML
LV EF BIPLANE MOD: 54 %
LV EF US.2D.A4C+ESTIMATED: 53 %
LVSV (TEICH): 82 ML
SL CV LV EF: 50
SL CV PED ECHO LEFT VENTRICLE DIASTOLIC VOLUME (MOD BIPLANE) 2D: 127 ML
SL CV PED ECHO LEFT VENTRICLE SYSTOLIC VOLUME (MOD BIPLANE) 2D: 44 ML

## 2025-07-03 PROCEDURE — 93308 TTE F-UP OR LMTD: CPT | Performed by: INTERNAL MEDICINE

## 2025-07-03 PROCEDURE — 93308 TTE F-UP OR LMTD: CPT

## 2025-07-07 NOTE — TELEPHONE ENCOUNTER
----- Message from IVY Hoffmann sent at 7/3/2025 10:07 AM EDT -----  Please let Jai know that his echo shows some improvement in his heart function compared to March.    ----- Message -----  From: Julian Hernandez MD  Sent: 7/3/2025   8:35 AM EDT  To: IVY Bear

## 2025-07-11 DIAGNOSIS — I48.0 PAROXYSMAL ATRIAL FIBRILLATION (HCC): ICD-10-CM

## 2025-07-11 RX ORDER — METOPROLOL SUCCINATE 25 MG/1
25 TABLET, EXTENDED RELEASE ORAL 2 TIMES DAILY
Qty: 180 TABLET | Refills: 1 | Status: SHIPPED | OUTPATIENT
Start: 2025-07-11 | End: 2026-01-07

## 2025-07-24 ENCOUNTER — OFFICE VISIT (OUTPATIENT)
Dept: CARDIOLOGY CLINIC | Facility: CLINIC | Age: 85
End: 2025-07-24
Payer: MEDICARE

## 2025-07-24 VITALS
OXYGEN SATURATION: 98 % | HEIGHT: 71 IN | DIASTOLIC BLOOD PRESSURE: 82 MMHG | HEART RATE: 63 BPM | BODY MASS INDEX: 25.98 KG/M2 | WEIGHT: 185.6 LBS | TEMPERATURE: 97.7 F | SYSTOLIC BLOOD PRESSURE: 130 MMHG

## 2025-07-24 DIAGNOSIS — I95.1 ORTHOSTATIC HYPOTENSION: ICD-10-CM

## 2025-07-24 DIAGNOSIS — Z95.0 PRESENCE OF CARDIAC PACEMAKER: ICD-10-CM

## 2025-07-24 DIAGNOSIS — E78.2 MIXED HYPERLIPIDEMIA: ICD-10-CM

## 2025-07-24 DIAGNOSIS — Z86.79 HISTORY OF TACHYCARDIA-BRADYCARDIA SYNDROME: ICD-10-CM

## 2025-07-24 DIAGNOSIS — I42.0 DILATED CARDIOMYOPATHY (HCC): Primary | ICD-10-CM

## 2025-07-24 DIAGNOSIS — I77.810 DILATED AORTIC ROOT (HCC): ICD-10-CM

## 2025-07-24 DIAGNOSIS — I34.0 NONRHEUMATIC MITRAL VALVE REGURGITATION: ICD-10-CM

## 2025-07-24 DIAGNOSIS — I48.0 PAROXYSMAL ATRIAL FIBRILLATION (HCC): ICD-10-CM

## 2025-07-24 PROCEDURE — 99214 OFFICE O/P EST MOD 30 MIN: CPT | Performed by: NURSE PRACTITIONER

## 2025-07-24 NOTE — ASSESSMENT & PLAN NOTE
Patient has a history of reduced LVEF.  Stage 1A heart failure.  He had an abnormal stress test in 2009 and underwent cardiac cath at that time that revealed no obvious coronary disease.  Holter monitor in 2019 revealed occasional PAC's and rare PVC's  Echo 2/4/2024 shows LVEF 50-55%.  Echo 3/2025 with EF around 40%. Frequent PVC's noted on PPM interrogation.  Metoprolol succinate increased to 50 mg BID but did not tolerate due to lightheadedness. Continue 25 mg BID dosing.  He has not been started on ACE/ARB secondary to history of hypotension and episodic lightheadedness.  He appears euvolemic without diuretics.  No anginal complaints.   Repeat echo shows stable LVEF of 50%.

## 2025-07-24 NOTE — PROGRESS NOTES
Cardiology Follow Up    Madhu Bach  1940  [id number]  [facility] [place] [address]  [address]  [facility] [address] [phone]    Jai presents for close follow up of frequent PVC's, dilated cardiomyopathy, and lightheadedness.    1. Dilated cardiomyopathy (HCC)  Assessment & Plan:  Patient has a history of reduced LVEF.  Stage 1A heart failure.  He had an abnormal stress test in 2009 and underwent cardiac cath at that time that revealed no obvious coronary disease.  Holter monitor in 2019 revealed occasional PAC's and rare PVC's  Echo 2/4/2024 shows LVEF 50-55%.  Echo 3/2025 with EF around 40%. Frequent PVC's noted on PPM interrogation.  Metoprolol succinate increased to 50 mg BID but did not tolerate due to lightheadedness. Continue 25 mg BID dosing.  He has not been started on ACE/ARB secondary to history of hypotension and episodic lightheadedness.  He appears euvolemic without diuretics.  No anginal complaints.   Repeat echo shows stable LVEF of 50%.  2. Nonrheumatic mitral valve regurgitation  Assessment & Plan:  Mild to moderate MR noted on HUSSEIN in 6/2020  Echocardiogram 2/2/2023 shows moderate MR.  Remains moderate on 3/2025 echo  He currently denies shortness of breath and does not exhibit volume overload.  Reviewed urgent s/s to report.  3. Paroxysmal atrial fibrillation (HCC)  Assessment & Plan:  Pacemaker device interrogation on 9/8/2021 reveals 11 episodes of atrial fibrillation, longest episode lasting 3 hours and 58 minutes, which was a new finding.  Patient was already on Eliquis 5mg BID for DVT treatment.   Advised that we will continue this anticoagulation long-term for stroke prevention.  Will continue to monitor AF burden on device checks. No AF on device check this month.  4. Dilated aortic root (HCC)  Assessment & Plan:  Aortic root has measured 3.8-4cm on prior imaging  3.6 cm aortic root noted on both 2024 and 2025  echocardiograms.  5. History of tachycardia-bradycardia syndrome  Assessment & Plan:  History of sick sinus syndrome resulting in pacemaker insertion in June of 2009.  [facility] took over device management.  Device interrogation on 10/14/2021 revealed device at [facility].  Pt underwent device generator replacement by Dr. Hyde on 10/21/21.   6. Presence of cardiac pacemaker  Assessment & Plan:  See discussion under tachy-cinda syndrome  [facility] Device clinic managing  7. Orthostatic hypotension  Assessment & Plan:  Secondary to medication use  Orthostatic check today is negative  Continue metoprolol succinate 25 mg BID  Monitor BP at home and report readings < 100/60  8. Mixed hyperlipidemia  Assessment & Plan:  Lipid panel 8/21/2024: C 150. T 125. H 46. L 79.  Pt remains on simvastatin 40 mg daily  Goal LDL < 100     HPI  Madhu has a past medical history of sick sinus syndrome requiring PPM in 6/2009, non-ischemic dilated cardiomyopathy, PAF, frequent PVC's,  fall with rib fracture and resulting hemothorax in 10/2020, and subsequent DVT on Eliquis.     He was initially following with Dr. Morfin at [facility]. He transferred to [facility] in spring 2021.     He then transitioned to [facility] and met with Dr. Calderón on 8/27/2021 for consultation. His pacemaker device management was transitioned to [facility] at that time.      He followed up with me on 9/16/2021 after evaluation at [facility] for left sided chest pain. Cardiac work up was unrevealing. He denied any recurrent chest pain at follow up. At that time his device interrogation revealed 4 episodes of NSVT and 11 episodes of atrial fibrillation with the longest episode lasting 3 hrs and 58 minutes. Metoprolol succinate was initiated. He was advised that his Eliquis will continue long-term for stroke prevention. He was asymptomatic with the episodes.     His device interrogation on 10/14/2021 revealed his pacemaker was at [facility]. He  met with Dr. Hyde for consultation on that date. He underwent pacemaker generator replacement on 10/21/2021 at [facility] by Dr. Hyde.     Echo and stress test were ordered in 2/2023 due to NSVT on his device interrogation. Metoprolol was increased to 50 mg daily.   Echocardiogram 2/6/2023 showed LVEF 50-55% with mild-mod LVH, mod MR.  Exercise nuclear stress test 3/21/2023 showed no perfused defects with Gated EF 40%.      At his 4/4/2025 OV his ECG showed atrial pacing with one PVC. Device interrogation on 3/30 showed an increase in his PVC burden to 177/hour. He was asymptomatic. He had recently been started on Requip and gabapentin by Dr. Zapata for restless leg. Echo 3/2025 showed LVEF 35-40% with mod MR. Metoprolol succinate was increased to 50 mg BID.    He was admitted to [facility] 6/18-6/20/2025 when he presented with an episode of profound leg weakness. 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 Stroke work up was unremarkable. Lab work showed negative HS trop x 3, stable blood count and renal function, mag 1.8, K 4.5, TSH 3.5, cortisol WNL, CK WNL. He was noted to have orthostatic hypotension. His metoprolol was initially held but resumed at 25 mg BID prior to discharge. He followed up with me on 6/26. 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 Symptoms were improved but he continued with some positional lightheadedness. His wife felt he was not hydrating well enough. He did reach out to Dr. Zapata to question if the gabapentin could be contributing as his dose was recently doubled, but Dr. Zapata says no.     7/24/2025: Jai presents for close follow up accompanied by his wife. He notes significant symptom improvement. He has been trying to hydrate better. He remains very active, he has cleaned 30 dryer vents this month. He is biking on his stationary bike indoors. He did clean their [employer] s[employer] for  about 2 hours. He feels great. No chest discomfort, shortness of breath, lightheadedness, edema. No palpitations. He completed a limited echo on 7/3 which shows stable LVEF of 50% with mod MR.     Medical Problems       Problem List       Non-seasonal allergic rhinitis    Closed fracture of multiple ribs of left side with routine healing    Bladder tumor    Normocytic anemia    History of venous thromboembolism    Other specified hypothyroidism    BPH (benign prostatic hyperplasia)    Gastroesophageal reflux disease    Orthostatic hypotension    Dilated cardiomyopathy (HCC)    Paroxysmal atrial fibrillation (HCC)    MR (mitral regurgitation)    Dilated aortic root (HCC)    History of tachycardia-bradycardia syndrome    Presence of cardiac pacemaker    Hyperlipidemia        Past Medical History[1]  Social History     Socioeconomic History    Marital status: /Civil Union     Spouse name: Not on file    Number of children: Not on file    Years of education: Not on file    Highest education level: Not on file   Occupational History    Not on file   Tobacco Use    Smoking status: Never     Passive exposure: Never    Smokeless tobacco: Never   Vaping Use    Vaping status: Never Used   Substance and Sexual Activity    Alcohol use: Not Currently    Drug use: Not Currently    Sexual activity: Not Currently   Other Topics Concern    Not on file   Social History Narrative    Not on file     Social Drivers of Health     Financial Resource Strain: Not on file   Food Insecurity: Patient Declined (6/19/2025)    Nursing - Inadequate Food Risk Classification     Worried About Running Out of Food in the Last Year: Not on file     Ran Out of Food in the Last Year: Not on file     Ran Out of Food in the Last Year: Patient declined   Transportation Needs: Patient Declined (6/19/2025)    Nursing - Transportation Risk Classification     Lack of Transportation: Not on file     Lack of Transportation: Patient declined   Physical  Activity: Not on file   Stress: Not on file   Social Connections: Unknown (6/18/2024)    Received from [facility]    Social GroupCharger     How often do you feel lonely or isolated from those around you? (Adult - for ages 18 years and over): Not on file   Intimate Partner Violence: Patient Declined (6/19/2025)    Nursing IPS     Feels Physically and Emotionally Safe: Not on file     Physically Hurt by Someone: Not on file     Humiliated or Emotionally Abused by Someone: Not on file     Physically Hurt by Someone: Patient declined     Hurt or Threatened by Someone: Patient declined   Housing Stability: Patient Declined (6/19/2025)    Nursing: Inadequate Housing Risk Classification     Has Housing: Not on file     Worried About Losing Housing: Not on file     Unable to Get Utilities: Not on file     Unable to Pay for Housing in the Last Year: Patient declined     Has Housing: Patient declined      Family History[2]  Past Surgical History[3]  Current Medications[4]  Allergies   Allergen Reactions    Penicillins Anaphylaxis and Hives       Labs:     Chemistry        Component Value Date/Time    K 4.8 06/20/2025 0501    K 4.1 09/25/2024 0614     06/20/2025 0501     09/09/2024 1115    CO2 28 06/20/2025 0501    CO2 29 09/09/2024 1115    BUN 22 06/20/2025 0501    BUN 19 09/09/2024 1115    CREATININE 0.91 06/20/2025 0501    CREATININE 0.88 09/09/2024 1115        Component Value Date/Time    CALCIUM 9.4 06/20/2025 0501    CALCIUM 9.4 09/09/2024 1115    ALKPHOS 47 06/18/2025 1922    ALKPHOS 49 09/09/2024 1115    AST 21 06/18/2025 1922    AST 23 09/09/2024 1115    ALT 19 06/18/2025 1922    ALT 23 09/09/2024 1115        Lipid panel 6/19/2025: C 162. T 126. H 43. L 94.     Cardiac testing:   Echo 7/3/2025    This is a limited echocardiogram for LVEF assessment only.   Left Ventricle: Left ventricular cavity size is normal. Wall thickness is moderately increased. The left ventricular ejection fraction is 50% by visual  estimation and 53% by biplane method. Systolic function is low normal. There is mild global hypokinesis.   Compared to previous echo from March 2025, LVEF is improved.     Cardiac EP device report 6/23/2025  MDT D-PM/ACTIVE SYSTEM IS MRI CONDITIONAL DEVICE INTERROGATED IN THE [place] OFFICE: BATTERY VOLTAGE ADEQUATE (8.6 YRS). AP: 86.2%. : 12.4% (MVP-ON). ALL LEAD PARAMETERS WITHIN NORMAL LIMITS. NO SIGNIFICANT HIGH RATE EPISODES. NO PROGRAMMING CHANGES MADE TO DEVICE PARAMETERS. NORMAL DEVICE FUNCTION.      ECG 4/4/2025: Atrial paced rhythm with prolonged AV conduction. One PVC. Rate 79 bpm.      Echo 3/3/2025:  LVEF reported at 37%. Visually appears to be closer to 40-45%.  Mod MR.  Aortic root 3.6 cm. Ascending aorta 3.4 cm.      Echo complete 2/5/2024  LVEF 50-54%. Mild LVH. Grade 1 DD.  Mod MR. Mild TR. PASP 36mmHg.  Aortic root 3.6 cm.     ECG 11/28/2023: Ventricular paced rhythm. Rate 66 bpm.     Exercise nuclear stress test 3/21/2023:  Billy protocol. Achieved 9.3 METs and 80% MPHR. No perfusion defects. Gated EF 40%.     Echocardiogram 2/6/2023:  EF 50-55%. Mild-mod LVH. Mild diastolic dysfunction.  RV enlarged with normal function.  Trace AI. Mod MR. Mild TR with PASP 30 mmHg.  Aortic root 3.9 cm, ascending aorta 3.2 cm.     ECG 10/29/2021: atrial paced rhythm with prolonged AV conduction with PAC, right bundle branch block, left posterior fascicular block, T wave abnormality, rate 69     Exercise nuclear stress test 9/3/2021:  Duration of exercise was 7 min and 0 sec. Target heart rate was achieved. There was no chest pain during stress. -  Gated SPECT: The calculated left ventricular ejection fraction was 45 %. There was mild global left ventricular hypokinesis. IMPRESSIONS: No evidence of ischemia/myocardium at risk. Inferior wall perfusion abnormality noted which may represent attenuation artifact given reasonable wall thickening in this segment. Low risk perfusion findings. Mildly reduced LV  "function suggested on gated analysis.       Echocardiogram 8/6/2021:  EF 50% Grade 1 diastolic dysfunction. Mild to moderate MR. Mild TR. Trace NY. Aortic root 3.5cm, ascending aorta 3.6cm. Compared to prior study 5/27/2020 the mitral regurgitation was previously reported to be possibly severe but appears at most moderate on this study. No other significant changes.     HUSSEIN 6/17/2020:  EF 40-45%. Left atrium mildly dilated. Mild to moderate MR.    Review of Systems   Constitutional: Negative.   HENT: Negative.     Cardiovascular:  Negative for chest pain, dyspnea on exertion, irregular heartbeat, leg swelling, near-syncope, orthopnea and palpitations.   Respiratory:  Negative for cough and snoring.    Endocrine: Negative.    Skin: Negative.    Musculoskeletal: Negative.    Gastrointestinal: Negative.    Genitourinary: Negative.    Neurological: Negative.    Psychiatric/Behavioral: Negative.         Vitals:    07/24/25 0835   BP: 130/82   Pulse: 63   Temp: 97.7 °F (36.5 °C)   SpO2: 98%     Vitals:    07/24/25 0835   Weight: 84.2 kg (185 lb 9.6 oz)     Height: 5' 11\" (180.3 cm)   Body mass index is 25.89 kg/m².    Physical Exam  Vitals and nursing note reviewed.   Constitutional:       General: He is not in acute distress.     Appearance: He is well-developed. He is not diaphoretic.   HENT:      Head: Normocephalic and atraumatic.   Neck:      Vascular: No JVD.     Cardiovascular:      Rate and Rhythm: Normal rate and regular rhythm. Occasional Extrasystoles are present.     Pulses: Intact distal pulses.      Heart sounds: Normal heart sounds, S1 normal and S2 normal. No murmur heard.     No friction rub. No gallop.      Comments: No edema  Pulmonary:      Effort: Pulmonary effort is normal. No respiratory distress.      Breath sounds: Normal breath sounds.   Abdominal:      General: There is no distension.      Palpations: Abdomen is soft.      Tenderness: There is no abdominal tenderness.     Skin:     General: Skin " is warm and dry.      Findings: No rash.     Neurological:      Mental Status: He is alert and oriented to person, place, and time.     Psychiatric:         Behavior: Behavior normal.                     [1]   Past Medical History:  Diagnosis Date    Bladder cancer (HCC)     Dilated aortic root (HCC)     Dilated cardiomyopathy (HCC)     DVT (deep venous thrombosis) (HCC)     GERD (gastroesophageal reflux disease)     Hyperlipidemia     Mitral regurgitation     Pacemaker     Pleural effusion     Rib fractures     SSS (sick sinus syndrome) (HCC)    [2]   Family History  Problem Relation Name Age of Onset    Lung cancer Mother      Colon cancer Father      COPD Sister     [3]   Past Surgical History:  Procedure Laterality Date    BLADDER TUMOR EXCISION      CARDIAC ELECTROPHYSIOLOGY PROCEDURE N/A 10/21/2021    PPM generator change - dual. Surgeon: Edy Hyde MD    CARDIAC PACEMAKER PLACEMENT  06/09/2009 2009, generator replacement 10/2021    HERNIA REPAIR      X2    THORACOSCOPY VIDEO ASSISTED SURGERY (VATS) Left 11/21/2020    THORACOSCOPY VIDEO ASSISTED SURGERY (VATS), washout of hemothorax, decortication. Surgeon: Alex Eduardo MD;  Location: [facility];  Service: Thoracic    TONSILLECTOMY     [4]   Current Outpatient Medications:     Cetirizine HCl (ZyrTEC Allergy) 10 MG CAPS, Take 10 mg by mouth in the morning, Disp: , Rfl:     Cholecalciferol 50 MCG (2000 UT) CAPS, Take by mouth, Disp: , Rfl:     Coenzyme Q10 (CO Q 10 PO), Take by mouth, Disp: , Rfl:     Cyanocobalamin (VITAMIN B 12 PO), Take by mouth, Disp: , Rfl:     Eliquis 5 MG, Take 1 tablet (5 mg total) by mouth 2 (two) times a day, Disp: 180 tablet, Rfl: 3    gabapentin (NEURONTIN) 100 mg capsule, Take 100 mg by mouth in the morning and 100 mg in the evening and 100 mg before bedtime., Disp: , Rfl:     levothyroxine 25 mcg tablet, , Disp: , Rfl:     Magnesium 250 MG CAPS, Take 250 mg by mouth in the morning, Disp: , Rfl:     metoprolol  succinate (TOPROL-XL) 25 mg 24 hr tablet, Take 1 tablet (25 mg total) by mouth in the morning and 1 tablet (25 mg total) before bedtime., Disp: 180 tablet, Rfl: 1    ondansetron (ZOFRAN) 4 mg tablet, Take 4 mg by mouth every 8 (eight) hours as needed, Disp: , Rfl:     pantoprazole (PROTONIX) 40 mg tablet, Take 40 mg by mouth in the morning., Disp: , Rfl:     rOPINIRole (REQUIP) 2 mg tablet, Take 2 mg by mouth in the morning and 2 mg before bedtime., Disp: , Rfl:     simvastatin (ZOCOR) 40 mg tablet, Take 40 mg by mouth daily at bedtime, Disp: , Rfl:

## 2025-07-29 ENCOUNTER — HOSPITAL ENCOUNTER (OUTPATIENT)
Dept: RADIOLOGY | Facility: CLINIC | Age: 85
Discharge: HOME | End: 2025-07-29
Attending: STUDENT IN AN ORGANIZED HEALTH CARE EDUCATION/TRAINING PROGRAM
Payer: MEDICARE

## 2025-07-29 VITALS — HEIGHT: 71 IN | BODY MASS INDEX: 25.9 KG/M2 | WEIGHT: 185 LBS

## 2025-07-29 DIAGNOSIS — M25.512 ACUTE PAIN OF LEFT SHOULDER: Primary | ICD-10-CM

## 2025-07-29 DIAGNOSIS — M19.012 PRIMARY OSTEOARTHRITIS OF LEFT SHOULDER: ICD-10-CM

## 2025-07-29 DIAGNOSIS — M67.814 TENDINOSIS OF LEFT ROTATOR CUFF: ICD-10-CM

## 2025-07-29 DIAGNOSIS — M25.512 ACUTE PAIN OF LEFT SHOULDER: ICD-10-CM

## 2025-07-29 DIAGNOSIS — Z79.01 ON CONTINUOUS ORAL ANTICOAGULATION: ICD-10-CM

## 2025-07-29 PROCEDURE — 20610 DRAIN/INJ JOINT/BURSA W/O US: CPT | Performed by: STUDENT IN AN ORGANIZED HEALTH CARE EDUCATION/TRAINING PROGRAM

## 2025-07-29 PROCEDURE — 73030 X-RAY EXAM OF SHOULDER: CPT

## 2025-07-29 PROCEDURE — 99214 OFFICE O/P EST MOD 30 MIN: CPT | Performed by: STUDENT IN AN ORGANIZED HEALTH CARE EDUCATION/TRAINING PROGRAM

## 2025-07-29 RX ORDER — TRIAMCINOLONE ACETONIDE 40 MG/ML
40 INJECTION, SUSPENSION INTRA-ARTICULAR; INTRAMUSCULAR
Status: COMPLETED | OUTPATIENT
Start: 2025-07-29 | End: 2025-07-29

## 2025-07-29 RX ORDER — LIDOCAINE HYDROCHLORIDE 10 MG/ML
2 INJECTION, SOLUTION INFILTRATION; PERINEURAL
Status: COMPLETED | OUTPATIENT
Start: 2025-07-29 | End: 2025-07-29

## 2025-07-29 RX ADMIN — LIDOCAINE HYDROCHLORIDE 2 ML: 10 INJECTION, SOLUTION INFILTRATION; PERINEURAL at 09:00

## 2025-07-29 RX ADMIN — TRIAMCINOLONE ACETONIDE 40 MG: 40 INJECTION, SUSPENSION INTRA-ARTICULAR; INTRAMUSCULAR at 09:00

## 2025-08-04 DIAGNOSIS — I48.0 PAROXYSMAL ATRIAL FIBRILLATION (HCC): ICD-10-CM

## 2025-08-04 RX ORDER — METOPROLOL SUCCINATE 25 MG/1
25 TABLET, EXTENDED RELEASE ORAL 2 TIMES DAILY
Qty: 180 TABLET | Refills: 1 | Status: SHIPPED | OUTPATIENT
Start: 2025-08-04 | End: 2026-01-31

## (undated) DEVICE — 28 FR STRAIGHT – SOFT PVC CATHETER: Brand: PVC THORACIC CATHETERS

## (undated) DEVICE — SUT VICRYL 3-0 SH 27 IN J416H

## (undated) DEVICE — DRAPE FLUID WARMER (BIRD BATH)

## (undated) DEVICE — ELECTRODE BLADE MOD E-Z CLEAN 2.5IN 6.4CM -0012M

## (undated) DEVICE — WOUND RETRACTOR AND PROTECTOR: Brand: ALEXIS WOUND PROTECTOR-RETRACTOR

## (undated) DEVICE — SPECIMEN TRAP: Brand: ARGYLE

## (undated) DEVICE — SUT VICRYL PLUS 0 CTB-1 27 IN VCPB260H

## (undated) DEVICE — GLOVE INDICATOR PI UNDERGLOVE SZ 6.5 BLUE

## (undated) DEVICE — SUT MONOCRYL PLUS 4-0 PS-2 18 IN MCP496G

## (undated) DEVICE — SPECIMEN CONTAINER STERILE PEEL PACK

## (undated) DEVICE — SCD SEQUENTIAL COMPRESSION COMFORT SLEEVE MEDIUM KNEE LENGTH: Brand: KENDALL SCD

## (undated) DEVICE — SPONGE TONSIL STRUNG 7/8 IN X-RAY DETECT

## (undated) DEVICE — GLOVE INDICATOR PI UNDERGLOVE SZ 8 BLUE

## (undated) DEVICE — CANISTER FOR THORACIC SUCTION SYSTEM: Brand: THOPAZ DISPOSABLE CANISTER 0.8L

## (undated) DEVICE — INTENDED FOR TISSUE SEPARATION, AND OTHER PROCEDURES THAT REQUIRE A SHARP SURGICAL BLADE TO PUNCTURE OR CUT.: Brand: BARD-PARKER SAFETY BLADES SIZE 15, STERILE

## (undated) DEVICE — DRAIN SPONGES,6 PLY: Brand: EXCILON

## (undated) DEVICE — 28 FR RIGHT ANGLE – SOFT PVC CATHETER: Brand: PVC THORACIC CATHETERS

## (undated) DEVICE — SUT PROLENE 0 CT-1 30 IN 8424H

## (undated) DEVICE — PAD GROUNDING ADULT

## (undated) DEVICE — CHLORAPREP HI-LITE 26ML ORANGE

## (undated) DEVICE — NEEDLE SPINAL 20G X 3.5 LF

## (undated) DEVICE — NEEDLE 25G X 1 1/2

## (undated) DEVICE — ADHESIVE SKIN HIGH VISCOSITY EXOFIN 1ML

## (undated) DEVICE — SUT VICRYL 2-0 SH 27 IN UNDYED J417H

## (undated) DEVICE — STERILE THORACIC PACK: Brand: CARDINAL HEALTH

## (undated) DEVICE — ANTI-FOG SOLUTION WITH FOAM PAD: Brand: DEVON

## (undated) DEVICE — 40529 DERMAPROX PAD 11'' X 15'' X 1'': Brand: 40529 DERMAPROX PAD 11'' X 15'' X 1''

## (undated) DEVICE — GLOVE SRG BIOGEL ECLIPSE 6.5

## (undated) DEVICE — GLOVE SRG BIOGEL ECLIPSE 7.5

## (undated) DEVICE — PLUMEPEN PRO 10FT